# Patient Record
Sex: FEMALE | Race: WHITE | NOT HISPANIC OR LATINO | Employment: FULL TIME | URBAN - METROPOLITAN AREA
[De-identification: names, ages, dates, MRNs, and addresses within clinical notes are randomized per-mention and may not be internally consistent; named-entity substitution may affect disease eponyms.]

---

## 2017-01-12 ENCOUNTER — COMMUNICATION - HEALTHEAST (OUTPATIENT)
Dept: FAMILY MEDICINE | Facility: CLINIC | Age: 57
End: 2017-01-12

## 2017-05-04 ENCOUNTER — COMMUNICATION - HEALTHEAST (OUTPATIENT)
Dept: FAMILY MEDICINE | Facility: CLINIC | Age: 57
End: 2017-05-04

## 2017-05-04 DIAGNOSIS — F41.1 ANXIETY STATE: ICD-10-CM

## 2017-06-12 ENCOUNTER — HOSPITAL ENCOUNTER (OUTPATIENT)
Dept: MAMMOGRAPHY | Facility: HOSPITAL | Age: 57
Discharge: HOME | End: 2017-06-12
Attending: FAMILY MEDICINE

## 2017-06-12 DIAGNOSIS — Z12.31 VISIT FOR SCREENING MAMMOGRAM: ICD-10-CM

## 2017-07-16 ENCOUNTER — OFFICE VISIT (OUTPATIENT)
Dept: URGENT CARE | Facility: URGENT CARE | Age: 57
End: 2017-07-16
Payer: COMMERCIAL

## 2017-07-16 VITALS
OXYGEN SATURATION: 98 % | TEMPERATURE: 98 F | WEIGHT: 292.4 LBS | SYSTOLIC BLOOD PRESSURE: 138 MMHG | DIASTOLIC BLOOD PRESSURE: 88 MMHG | HEART RATE: 52 BPM

## 2017-07-16 DIAGNOSIS — I80.02 THROMBOPHLEBITIS OF SUPERFICIAL VEINS OF LEFT LOWER EXTREMITY: Primary | ICD-10-CM

## 2017-07-16 PROCEDURE — 99213 OFFICE O/P EST LOW 20 MIN: CPT | Performed by: PHYSICIAN ASSISTANT

## 2017-07-16 RX ORDER — DOXYCYCLINE 100 MG/1
100 CAPSULE ORAL 2 TIMES DAILY
Qty: 20 CAPSULE | Refills: 0 | Status: SHIPPED | OUTPATIENT
Start: 2017-07-16 | End: 2022-02-23

## 2017-07-16 RX ORDER — OXYBUTYNIN CHLORIDE 15 MG/1
15 TABLET, EXTENDED RELEASE ORAL DAILY
COMMUNITY
End: 2022-02-23

## 2017-07-16 ASSESSMENT — ENCOUNTER SYMPTOMS
SEIZURES: 0
HEARTBURN: 0
WHEEZING: 0
DIARRHEA: 0
WEAKNESS: 0
HALLUCINATIONS: 0
SPUTUM PRODUCTION: 0
TINGLING: 0
BLOOD IN STOOL: 0
ABDOMINAL PAIN: 0
SHORTNESS OF BREATH: 0
MYALGIAS: 0
PALPITATIONS: 0
EYE DISCHARGE: 0
SORE THROAT: 0
FEVER: 0
FOCAL WEAKNESS: 0
NAUSEA: 0
HEADACHES: 0
DYSURIA: 0
BLURRED VISION: 0
LOSS OF CONSCIOUSNESS: 0
WEIGHT LOSS: 0
FREQUENCY: 0
PHOTOPHOBIA: 0
DIZZINESS: 0
CONSTIPATION: 0
NEUROLOGICAL NEGATIVE: 1
SENSORY CHANGE: 0
BACK PAIN: 0
COUGH: 0
HEMOPTYSIS: 0
ORTHOPNEA: 0
NERVOUS/ANXIOUS: 0
VOMITING: 0
DOUBLE VISION: 0
INSOMNIA: 0
EYE PAIN: 0
NECK PAIN: 0
DEPRESSION: 0
EYE REDNESS: 0
DIAPHORESIS: 0

## 2017-07-16 ASSESSMENT — LIFESTYLE VARIABLES: SUBSTANCE_ABUSE: 0

## 2017-07-16 NOTE — PROGRESS NOTES
HPI    SUBJECTIVE:                                                    Riana Cui is a 56 year old female who presents to clinic today for concern regarding possible blood clot left lower leg. She has a long history of edema and wears Jobst stockings. She has noticed some redness associated with a pain on her left lower leg and is concerned about clot. She's never had DVTs in the past.        Problem list and histories reviewed & adjusted, as indicated.  Additional history: as documented    There is no problem list on file for this patient.    History reviewed. No pertinent surgical history.    Social History   Substance Use Topics     Smoking status: Current Every Day Smoker     Years: 1.00     Smokeless tobacco: Not on file     Alcohol use Not on file     History reviewed. No pertinent family history.      Current Outpatient Prescriptions   Medication Sig Dispense Refill     PAROXETINE HCL PO Take 40 mg by mouth daily       oxybutynin chloride 15 MG TB24 Take 15 mg by mouth daily       Allergies   Allergen Reactions     Ibuprofen Anaphylaxis     Shellfish-Derived Products Anaphylaxis     Labs reviewed in EPIC    Reviewed and updated as needed this visit by clinical staff  Tobacco  Allergies  Meds  Med Hx  Surg Hx  Fam Hx  Soc Hx      Reviewed and updated as needed this visit by Provider               Review of Systems   Constitutional: Negative for diaphoresis, fever, malaise/fatigue and weight loss.   HENT: Negative for congestion, ear discharge, ear pain, hearing loss, nosebleeds and sore throat.    Eyes: Negative for blurred vision, double vision, photophobia, pain, discharge and redness.   Respiratory: Negative for cough, hemoptysis, sputum production, shortness of breath and wheezing.    Cardiovascular: Negative for chest pain, palpitations, orthopnea and leg swelling.   Gastrointestinal: Negative for abdominal pain, blood in stool, constipation, diarrhea, heartburn, melena, nausea and vomiting.    Genitourinary: Negative.  Negative for dysuria, frequency and urgency.   Musculoskeletal: Positive for joint pain. Negative for back pain, myalgias and neck pain.   Skin: Negative for itching and rash.   Neurological: Negative.  Negative for dizziness, tingling, sensory change, focal weakness, seizures, loss of consciousness, weakness and headaches.   Endo/Heme/Allergies: Negative.    Psychiatric/Behavioral: Negative for depression, hallucinations, substance abuse and suicidal ideas. The patient is not nervous/anxious and does not have insomnia.          Physical Exam   Constitutional: She is oriented to person, place, and time and well-developed, well-nourished, and in no distress. No distress.   HENT:   Head: Normocephalic and atraumatic.   Right Ear: External ear normal.   Left Ear: External ear normal.   Nose: Nose normal.   Eyes: Conjunctivae and EOM are normal. Pupils are equal, round, and reactive to light. Right eye exhibits no discharge. Left eye exhibits no discharge. No scleral icterus.   Neck: Normal range of motion. Neck supple. No JVD present. No tracheal deviation present. No thyromegaly present.   Cardiovascular: Normal rate, regular rhythm, normal heart sounds and intact distal pulses.  Exam reveals no gallop and no friction rub.    No murmur heard.  Pulmonary/Chest: Effort normal and breath sounds normal. No stridor. No respiratory distress. She has no wheezes. She has no rales. She exhibits no tenderness.   Abdominal: Soft. Bowel sounds are normal. She exhibits no distension and no mass. There is no tenderness. There is no rebound and no guarding.   Musculoskeletal: Normal range of motion. She exhibits no tenderness.        Left lower leg: She exhibits edema.        Legs:  Redness is present along the superficial veins on the anterior aspect of the left lower leg. She has a long history of edema and wears Jobst stockings   Lymphadenopathy:     She has no cervical adenopathy.   Neurological: She  is alert and oriented to person, place, and time. She has normal reflexes. No cranial nerve deficit. She exhibits normal muscle tone. Gait normal.   Skin: Skin is warm and dry. No rash noted. She is not diaphoretic. No erythema. No pallor.   Psychiatric: Mood, memory, affect and judgment normal.       (I80.02) Thrombophlebitis of superficial veins of left lower extremity  (primary encounter diagnosis)  Comment:   Plan: doxycycline (VIBRAMYCIN) 100 MG capsule           I explained superficial thrombophlebitis to her and gave her a handout regarding this. She will use heat to the area continued to use her Jobst stockings and I would like her to use anti-inflammatories but she has an allergy to any improvement so we will avoid anti-inflammatories. I also explained that occasionally there is an infectious component to this and because we cannot use anti-inflammatories I have prescribed doxycycline.

## 2017-07-16 NOTE — PATIENT INSTRUCTIONS
Superficial Thrombophlebitis    The superficial veins are the veins near the surface of the skin. Superficial thrombophlebitis is a problem that occurs when one or more of these veins become inflamed (red, irritated, and swollen). This is most often due to a blood clot.  Causes  The problem may occur after injury to a vein. It may also occur after having an intravenous (IV) line placed. Other factors that can make the problem more likely include:    Varicose veins    Venous insufficiency    Bleeding disorders    Prolonged periods of rest and not moving around    IV drug abuse  Symptoms  Symptoms may appear in the affected area. They can include:    Pain    Tenderness    Redness    Warmth    Swelling    Hardening of the vein  In most cases, superficial thrombophlebitis resolves on its own with no problems. Treatment is focused on relieving symptoms.  Sometimes, there is a risk that the deep veins in the body may also be involved. This can lead to more serious problems. In such cases, further testing and treatments may be needed. Your healthcare provider can tell you more about this.  Home Care  To help relieve pain and swelling, you may be advised to:    Apply heat or cold to the affected area. Do this for up to 10 minutes as often as directed.    Heat: Use a warm compress, such as a heating pad.    Cold: Use a cold compress, such as a cold pack or bag of ice wrapped in a thin towel.    Take nonsteroidal anti-inflammatory drugs (NSAIDS), such as ibuprofen. In some cases, other pain medicines may be prescribed.    Keep the affected limb (arm or leg) raised above heart level as directed.    Wear elastic compression stockings or bandages as directed.    Avoid prolonged sitting or standing. Get up and walk often.  To help treat a blood clot, a blood thinner (anticoagulant) may be prescribed. If this is needed, be sure to take the medicine exactly as directed.  Follow-up care  Follow up with your healthcare provider as  advised. If imaging tests are done, they will be reviewed by a doctor. You ll be told the results and any new findings that may affect your treatment.  When to seek medical advice  Call your healthcare provider right away if any of these occur:    Fever of 100.4 F (38 C) or higher, or as directed by your provider    Increasing pain, swelling, or tenderness in the affected area    Spreading warmth or redness in the affected area  Call 911  Call 911 right away if any of these occur:    Trouble breathing    Chest pain or discomfort that worsens with deep breathing or coughing    Coughing (may cough up blood)    Fast or irregular heartbeat    Sweating    Anxiety    Lightheadedness, dizziness, or fainting    Extreme confusion    Extreme drowsiness or trouble waking up    New pain in the chest, arm, shoulder, neck, or upper back  Date Last Reviewed: 9/21/2015 2000-2017 [employer]. [address] [place] [address], [place] [address]. All rights reserved. This information is not intended as a substitute for professional medical care. Always follow your healthcare professional's instructions.

## 2017-07-16 NOTE — MR AVS SNAPSHOT
After Visit Summary   7/16/2017    Riana Cui    MRN: [id number]           Patient Information     Date Of Birth          1960        Visit Information        Provider Department      7/16/2017 4:05 PM Barrington Rodriguez PA-C [facility]        Care Instructions      Superficial Thrombophlebitis    The superficial veins are the veins near the surface of the skin. Superficial thrombophlebitis is a problem that occurs when one or more of these veins become inflamed (red, irritated, and swollen). This is most often due to a blood clot.  Causes  The problem may occur after injury to a vein. It may also occur after having an intravenous (IV) line placed. Other factors that can make the problem more likely include:    Varicose veins    Venous insufficiency    Bleeding disorders    Prolonged periods of rest and not moving around    IV drug abuse  Symptoms  Symptoms may appear in the affected area. They can include:    Pain    Tenderness    Redness    Warmth    Swelling    Hardening of the vein  In most cases, superficial thrombophlebitis resolves on its own with no problems. Treatment is focused on relieving symptoms.  Sometimes, there is a risk that the deep veins in the body may also be involved. This can lead to more serious problems. In such cases, further testing and treatments may be needed. Your healthcare provider can tell you more about this.  Home Care  To help relieve pain and swelling, you may be advised to:    Apply heat or cold to the affected area. Do this for up to 10 minutes as often as directed.    Heat: Use a warm compress, such as a heating pad.    Cold: Use a cold compress, such as a cold pack or bag of ice wrapped in a thin towel.    Take nonsteroidal anti-inflammatory drugs (NSAIDS), such as ibuprofen. In some cases, other pain medicines may be prescribed.    Keep the affected limb (arm or leg) raised above heart level as directed.    Wear  elastic compression stockings or bandages as directed.    Avoid prolonged sitting or standing. Get up and walk often.  To help treat a blood clot, a blood thinner (anticoagulant) may be prescribed. If this is needed, be sure to take the medicine exactly as directed.  Follow-up care  Follow up with your healthcare provider as advised. If imaging tests are done, they will be reviewed by a doctor. You ll be told the results and any new findings that may affect your treatment.  When to seek medical advice  Call your healthcare provider right away if any of these occur:    Fever of 100.4 F (38 C) or higher, or as directed by your provider    Increasing pain, swelling, or tenderness in the affected area    Spreading warmth or redness in the affected area  Call 911  Call 911 right away if any of these occur:    Trouble breathing    Chest pain or discomfort that worsens with deep breathing or coughing    Coughing (may cough up blood)    Fast or irregular heartbeat    Sweating    Anxiety    Lightheadedness, dizziness, or fainting    Extreme confusion    Extreme drowsiness or trouble waking up    New pain in the chest, arm, shoulder, neck, or upper back  Date Last Reviewed: 9/21/2015 2000-2017 [employer]. [address] [place] [address]. All rights reserved. This information is not intended as a substitute for professional medical care. Always follow your healthcare professional's instructions.                Follow-ups after your visit        Who to contact     If you have questions or need follow up information about today's clinic visit or your schedule please contact [facility] directly at [phone].  Normal or non-critical lab and imaging results will be communicated to you by MyChart, letter or phone within 4 business days after the clinic has received the results. If you do not hear from us within 7 days, please contact the clinic through MyChart or phone.  "If you have a critical or abnormal lab result, we will notify you by phone as soon as possible.  Submit refill requests through [employer] or call your pharmacy and they will forward the refill request to us. Please allow 3 business days for your refill to be completed.          Additional Information About Your Visit        [employer] Information     [employer] lets you send messages to your doctor, view your test results, renew your prescriptions, schedule appointments and more. To sign up, go to [email or web address] . Click on \"Log in\" on the left side of the screen, which will take you to the Welcome page. Then click on \"Sign up Now\" on the right side of the page.     You will be asked to enter the access code listed below, as well as some personal information. Please follow the directions to create your username and password.     Your access code is: [email or web address]  Expires: 10/14/2017  4:28 PM     Your access code will  in 90 days. If you need help or a new code, please call your [facility] clinic or [phone].        Care EveryWhere ID     This is your Care EveryWhere ID. This could be used by other organizations to access your [facility] medical records  [id number]        Your Vitals Were     Pulse Temperature Pulse Oximetry             52 98  F (36.7  C) (Tympanic) 98%          Blood Pressure from Last 3 Encounters:   17 138/88    Weight from Last 3 Encounters:   17 292 lb 6.4 oz (132.6 kg)              Today, you had the following     No orders found for display       Primary Care Provider    None Specified       No primary provider on file.        Equal Access to Services     [facility]: Hadii daron reyes [place], waaxda luqadaha, qaybta kaalmatammy vázquez . So [facility] [phone].    ATENCIÓN: Si habla español, tiene a javed disposición servicios gratuitos de asistencia lingüística. Llame al [phone].    We comply with applicable federal civil " rights laws and [place] laws. We do not discriminate on the basis of race, color, national origin, age, disability sex, sexual orientation or gender identity.            Thank you!     Thank you for choosing [facility]  for your care. Our goal is always to provide you with excellent care. Hearing back from our patients is one way we can continue to improve our services. Please take a few minutes to complete the written survey that you may receive in the mail after your visit with us. Thank you!             Your Updated Medication List - Protect others around you: Learn how to safely use, store and throw away your medicines at www.disposemymeds.org.          This list is accurate as of: 7/16/17  4:28 PM.  Always use your most recent med list.                   Brand Name Dispense Instructions for use Diagnosis    oxybutynin chloride 15 MG Tb24      Take 15 mg by mouth daily        PAROXETINE HCL PO      Take 40 mg by mouth daily

## 2017-07-20 ENCOUNTER — COMMUNICATION - HEALTHEAST (OUTPATIENT)
Dept: FAMILY MEDICINE | Facility: CLINIC | Age: 57
End: 2017-07-20

## 2017-07-20 ENCOUNTER — OFFICE VISIT - HEALTHEAST (OUTPATIENT)
Dept: FAMILY MEDICINE | Facility: CLINIC | Age: 57
End: 2017-07-20

## 2017-07-20 DIAGNOSIS — L03.90 CELLULITIS: ICD-10-CM

## 2017-07-20 DIAGNOSIS — I80.3 THROMBOPHLEBITIS LEG: ICD-10-CM

## 2017-07-25 ENCOUNTER — RECORDS - HEALTHEAST (OUTPATIENT)
Dept: VASCULAR ULTRASOUND | Facility: CLINIC | Age: 57
End: 2017-07-25

## 2017-07-25 ENCOUNTER — RECORDS - HEALTHEAST (OUTPATIENT)
Dept: ADMINISTRATIVE | Facility: OTHER | Age: 57
End: 2017-07-25

## 2017-07-25 ENCOUNTER — OFFICE VISIT - HEALTHEAST (OUTPATIENT)
Dept: VASCULAR SURGERY | Facility: CLINIC | Age: 57
End: 2017-07-25

## 2017-07-25 DIAGNOSIS — I83.893 SYMPTOMATIC VARICOSE VEINS OF BOTH LOWER EXTREMITIES: ICD-10-CM

## 2017-07-25 DIAGNOSIS — I87.2 VENOUS INSUFFICIENCY OF BOTH LOWER EXTREMITIES: ICD-10-CM

## 2017-07-25 DIAGNOSIS — I87.2 VENOUS INSUFFICIENCY (CHRONIC) (PERIPHERAL): ICD-10-CM

## 2017-07-25 DIAGNOSIS — I83.893 VARICOSE VEINS OF BILATERAL LOWER EXTREMITIES WITH OTHER COMPLICATIONS: ICD-10-CM

## 2017-09-24 ENCOUNTER — COMMUNICATION - HEALTHEAST (OUTPATIENT)
Dept: FAMILY MEDICINE | Facility: CLINIC | Age: 57
End: 2017-09-24

## 2017-09-24 DIAGNOSIS — K21.9 GERD (GASTROESOPHAGEAL REFLUX DISEASE): ICD-10-CM

## 2017-10-03 ENCOUNTER — COMMUNICATION - HEALTHEAST (OUTPATIENT)
Dept: FAMILY MEDICINE | Facility: CLINIC | Age: 57
End: 2017-10-03

## 2017-12-14 ENCOUNTER — COMMUNICATION - HEALTHEAST (OUTPATIENT)
Dept: FAMILY MEDICINE | Facility: CLINIC | Age: 57
End: 2017-12-14

## 2017-12-22 ENCOUNTER — OFFICE VISIT - HEALTHEAST (OUTPATIENT)
Dept: FAMILY MEDICINE | Facility: CLINIC | Age: 57
End: 2017-12-22

## 2017-12-22 DIAGNOSIS — N32.81 OVERACTIVE BLADDER: ICD-10-CM

## 2017-12-22 DIAGNOSIS — F41.1 ANXIETY STATE: ICD-10-CM

## 2017-12-22 DIAGNOSIS — Z12.11 SCREEN FOR COLON CANCER: ICD-10-CM

## 2017-12-22 DIAGNOSIS — Z98.84 BARIATRIC SURGERY STATUS: ICD-10-CM

## 2017-12-22 DIAGNOSIS — R05.9 COUGH: ICD-10-CM

## 2017-12-22 DIAGNOSIS — Z72.0 TOBACCO USE: ICD-10-CM

## 2017-12-22 DIAGNOSIS — Z00.00 ROUTINE GENERAL MEDICAL EXAMINATION AT A HEALTH CARE FACILITY: ICD-10-CM

## 2017-12-22 LAB
CHOLEST SERPL-MCNC: 212 MG/DL
FASTING STATUS PATIENT QL REPORTED: YES
HDLC SERPL-MCNC: 49 MG/DL
LDLC SERPL CALC-MCNC: 144 MG/DL
TRIGL SERPL-MCNC: 96 MG/DL

## 2017-12-22 ASSESSMENT — MIFFLIN-ST. JEOR: SCORE: 1863.42

## 2018-01-10 ENCOUNTER — COMMUNICATION - HEALTHEAST (OUTPATIENT)
Dept: FAMILY MEDICINE | Facility: CLINIC | Age: 58
End: 2018-01-10

## 2018-01-19 ENCOUNTER — COMMUNICATION - HEALTHEAST (OUTPATIENT)
Dept: FAMILY MEDICINE | Facility: CLINIC | Age: 58
End: 2018-01-19

## 2018-04-30 ENCOUNTER — RECORDS - HEALTHEAST (OUTPATIENT)
Dept: ADMINISTRATIVE | Facility: OTHER | Age: 58
End: 2018-04-30

## 2018-05-17 ENCOUNTER — COMMUNICATION - HEALTHEAST (OUTPATIENT)
Dept: FAMILY MEDICINE | Facility: CLINIC | Age: 58
End: 2018-05-17

## 2018-05-17 DIAGNOSIS — F41.1 ANXIETY STATE: ICD-10-CM

## 2018-08-16 ENCOUNTER — COMMUNICATION - HEALTHEAST (OUTPATIENT)
Dept: FAMILY MEDICINE | Facility: CLINIC | Age: 58
End: 2018-08-16

## 2019-07-15 ENCOUNTER — COMMUNICATION - HEALTHEAST (OUTPATIENT)
Dept: FAMILY MEDICINE | Facility: CLINIC | Age: 59
End: 2019-07-15

## 2019-07-15 DIAGNOSIS — F41.1 ANXIETY STATE: ICD-10-CM

## 2019-08-05 ENCOUNTER — OFFICE VISIT - HEALTHEAST (OUTPATIENT)
Dept: FAMILY MEDICINE | Facility: CLINIC | Age: 59
End: 2019-08-05

## 2019-08-05 DIAGNOSIS — Z98.84 BARIATRIC SURGERY STATUS: ICD-10-CM

## 2019-08-05 DIAGNOSIS — N32.81 OVERACTIVE BLADDER: ICD-10-CM

## 2019-08-05 DIAGNOSIS — Z00.00 ROUTINE GENERAL MEDICAL EXAMINATION AT A HEALTH CARE FACILITY: ICD-10-CM

## 2019-08-05 DIAGNOSIS — E66.01 MORBID OBESITY (H): ICD-10-CM

## 2019-08-05 DIAGNOSIS — Z72.0 TOBACCO USE: ICD-10-CM

## 2019-08-05 DIAGNOSIS — Z12.11 SCREEN FOR COLON CANCER: ICD-10-CM

## 2019-08-05 DIAGNOSIS — E03.8 OTHER SPECIFIED HYPOTHYROIDISM: ICD-10-CM

## 2019-08-05 DIAGNOSIS — Z12.31 VISIT FOR SCREENING MAMMOGRAM: ICD-10-CM

## 2019-08-05 DIAGNOSIS — F41.1 ANXIETY STATE: ICD-10-CM

## 2019-08-05 LAB
ALBUMIN SERPL-MCNC: 3.4 G/DL (ref 3.5–5)
ALP SERPL-CCNC: 85 U/L (ref 45–120)
ALT SERPL W P-5'-P-CCNC: 10 U/L (ref 0–45)
ANION GAP SERPL CALCULATED.3IONS-SCNC: 9 MMOL/L (ref 5–18)
AST SERPL W P-5'-P-CCNC: 15 U/L (ref 0–40)
BILIRUB SERPL-MCNC: 0.3 MG/DL (ref 0–1)
BUN SERPL-MCNC: 10 MG/DL (ref 8–22)
CALCIUM SERPL-MCNC: 9 MG/DL (ref 8.5–10.5)
CHLORIDE BLD-SCNC: 107 MMOL/L (ref 98–107)
CHOLEST SERPL-MCNC: 180 MG/DL
CO2 SERPL-SCNC: 24 MMOL/L (ref 22–31)
CREAT SERPL-MCNC: 0.74 MG/DL (ref 0.6–1.1)
ERYTHROCYTE [DISTWIDTH] IN BLOOD BY AUTOMATED COUNT: 12.5 % (ref 11–14.5)
FASTING STATUS PATIENT QL REPORTED: YES
FERRITIN SERPL-MCNC: 12 NG/ML (ref 10–130)
GFR SERPL CREATININE-BSD FRML MDRD: >60 ML/MIN/1.73M2
GLUCOSE BLD-MCNC: 88 MG/DL (ref 70–125)
HBA1C MFR BLD: 5.6 % (ref 3.5–6)
HCT VFR BLD AUTO: 41.1 % (ref 35–47)
HDLC SERPL-MCNC: 48 MG/DL
HGB BLD-MCNC: 13.8 G/DL (ref 12–16)
LDLC SERPL CALC-MCNC: 116 MG/DL
MCH RBC QN AUTO: 29.1 PG (ref 27–34)
MCHC RBC AUTO-ENTMCNC: 33.5 G/DL (ref 32–36)
MCV RBC AUTO: 87 FL (ref 80–100)
PLATELET # BLD AUTO: 190 THOU/UL (ref 140–440)
PMV BLD AUTO: 8.8 FL (ref 7–10)
POTASSIUM BLD-SCNC: 4.2 MMOL/L (ref 3.5–5)
PROT SERPL-MCNC: 6.2 G/DL (ref 6–8)
RBC # BLD AUTO: 4.75 MILL/UL (ref 3.8–5.4)
SODIUM SERPL-SCNC: 140 MMOL/L (ref 136–145)
TRIGL SERPL-MCNC: 80 MG/DL
TSH SERPL DL<=0.005 MIU/L-ACNC: 1.44 UIU/ML (ref 0.3–5)
VIT B12 SERPL-MCNC: 222 PG/ML (ref 213–816)
WBC: 5.2 THOU/UL (ref 4–11)

## 2019-08-05 ASSESSMENT — MIFFLIN-ST. JEOR: SCORE: 1799.92

## 2019-08-06 LAB — 25(OH)D3 SERPL-MCNC: 18.7 NG/ML (ref 30–80)

## 2019-08-07 ENCOUNTER — COMMUNICATION - HEALTHEAST (OUTPATIENT)
Dept: FAMILY MEDICINE | Facility: CLINIC | Age: 59
End: 2019-08-07

## 2019-08-07 LAB — ZINC SERPL-MCNC: 57.2 UG/DL (ref 60–120)

## 2019-08-08 LAB
ANNOTATION COMMENT IMP: ABNORMAL
VIT A SERPL-MCNC: 0.25 MG/L (ref 0.3–1.2)
VITAMIN A (RETINYL PALMITATE): <0.02 MG/L (ref 0–0.1)

## 2019-09-09 ENCOUNTER — COMMUNICATION - HEALTHEAST (OUTPATIENT)
Dept: FAMILY MEDICINE | Facility: CLINIC | Age: 59
End: 2019-09-09

## 2019-09-24 ENCOUNTER — AMBULATORY - HEALTHEAST (OUTPATIENT)
Dept: NURSING | Facility: CLINIC | Age: 59
End: 2019-09-24

## 2019-10-09 ENCOUNTER — HOSPITAL ENCOUNTER (OUTPATIENT)
Dept: MAMMOGRAPHY | Facility: CLINIC | Age: 59
Discharge: HOME | End: 2019-10-09

## 2019-10-09 DIAGNOSIS — Z12.31 VISIT FOR SCREENING MAMMOGRAM: ICD-10-CM

## 2020-01-24 ENCOUNTER — COMMUNICATION - HEALTHEAST (OUTPATIENT)
Dept: FAMILY MEDICINE | Facility: CLINIC | Age: 60
End: 2020-01-24

## 2020-07-08 ENCOUNTER — COMMUNICATION - HEALTHEAST (OUTPATIENT)
Dept: FAMILY MEDICINE | Facility: CLINIC | Age: 60
End: 2020-07-08

## 2020-07-13 ENCOUNTER — OFFICE VISIT - HEALTHEAST (OUTPATIENT)
Dept: FAMILY MEDICINE | Facility: CLINIC | Age: 60
End: 2020-07-13

## 2020-07-13 DIAGNOSIS — R60.0 PEDAL EDEMA: ICD-10-CM

## 2020-07-13 ASSESSMENT — PATIENT HEALTH QUESTIONNAIRE - PHQ9: SUM OF ALL RESPONSES TO PHQ QUESTIONS 1-9: 1

## 2020-07-21 ENCOUNTER — COMMUNICATION - HEALTHEAST (OUTPATIENT)
Dept: FAMILY MEDICINE | Facility: CLINIC | Age: 60
End: 2020-07-21

## 2020-07-21 DIAGNOSIS — F41.1 ANXIETY STATE: ICD-10-CM

## 2020-08-20 ENCOUNTER — OFFICE VISIT - HEALTHEAST (OUTPATIENT)
Dept: FAMILY MEDICINE | Facility: CLINIC | Age: 60
End: 2020-08-20

## 2020-08-20 DIAGNOSIS — E03.8 OTHER SPECIFIED HYPOTHYROIDISM: ICD-10-CM

## 2020-08-20 DIAGNOSIS — Z00.00 ROUTINE GENERAL MEDICAL EXAMINATION AT A HEALTH CARE FACILITY: ICD-10-CM

## 2020-08-20 DIAGNOSIS — F41.1 ANXIETY STATE: ICD-10-CM

## 2020-08-20 DIAGNOSIS — Z12.11 SCREEN FOR COLON CANCER: ICD-10-CM

## 2020-08-20 DIAGNOSIS — E66.01 MORBID OBESITY (H): ICD-10-CM

## 2020-08-20 DIAGNOSIS — Z98.84 BARIATRIC SURGERY STATUS: ICD-10-CM

## 2020-08-20 DIAGNOSIS — Z72.0 TOBACCO USE: ICD-10-CM

## 2020-08-20 LAB
ALBUMIN SERPL-MCNC: 3.4 G/DL (ref 3.5–5)
ALP SERPL-CCNC: 89 U/L (ref 45–120)
ALT SERPL W P-5'-P-CCNC: <9 U/L (ref 0–45)
ANION GAP SERPL CALCULATED.3IONS-SCNC: 11 MMOL/L (ref 5–18)
AST SERPL W P-5'-P-CCNC: 14 U/L (ref 0–40)
BILIRUB SERPL-MCNC: 0.4 MG/DL (ref 0–1)
BUN SERPL-MCNC: 13 MG/DL (ref 8–22)
CALCIUM SERPL-MCNC: 8.6 MG/DL (ref 8.5–10.5)
CHLORIDE BLD-SCNC: 103 MMOL/L (ref 98–107)
CHOLEST SERPL-MCNC: 205 MG/DL
CO2 SERPL-SCNC: 25 MMOL/L (ref 22–31)
CREAT SERPL-MCNC: 0.74 MG/DL (ref 0.6–1.1)
ERYTHROCYTE [DISTWIDTH] IN BLOOD BY AUTOMATED COUNT: 13 % (ref 11–14.5)
FASTING STATUS PATIENT QL REPORTED: YES
FERRITIN SERPL-MCNC: 13 NG/ML (ref 10–130)
GFR SERPL CREATININE-BSD FRML MDRD: >60 ML/MIN/1.73M2
GLUCOSE BLD-MCNC: 88 MG/DL (ref 70–125)
HBA1C MFR BLD: 5.3 %
HCT VFR BLD AUTO: 40.9 % (ref 35–47)
HDLC SERPL-MCNC: 47 MG/DL
HGB BLD-MCNC: 13.6 G/DL (ref 12–16)
LDLC SERPL CALC-MCNC: 139 MG/DL
MCH RBC QN AUTO: 29 PG (ref 27–34)
MCHC RBC AUTO-ENTMCNC: 33.1 G/DL (ref 32–36)
MCV RBC AUTO: 88 FL (ref 80–100)
PLATELET # BLD AUTO: 185 THOU/UL (ref 140–440)
PMV BLD AUTO: 9 FL (ref 7–10)
POTASSIUM BLD-SCNC: 4.1 MMOL/L (ref 3.5–5)
PROT SERPL-MCNC: 6.5 G/DL (ref 6–8)
RBC # BLD AUTO: 4.67 MILL/UL (ref 3.8–5.4)
SODIUM SERPL-SCNC: 139 MMOL/L (ref 136–145)
TRIGL SERPL-MCNC: 96 MG/DL
TSH SERPL DL<=0.005 MIU/L-ACNC: 2.14 UIU/ML (ref 0.3–5)
VIT B12 SERPL-MCNC: 246 PG/ML (ref 213–816)
WBC: 5.2 THOU/UL (ref 4–11)

## 2020-08-20 ASSESSMENT — MIFFLIN-ST. JEOR: SCORE: 1826.67

## 2020-08-21 LAB
25(OH)D3 SERPL-MCNC: 18.5 NG/ML (ref 30–80)
25(OH)D3 SERPL-MCNC: 18.5 NG/ML (ref 30–80)
HPV SOURCE: NORMAL
HUMAN PAPILLOMA VIRUS 16 DNA: NEGATIVE
HUMAN PAPILLOMA VIRUS 18 DNA: NEGATIVE
HUMAN PAPILLOMA VIRUS FINAL DIAGNOSIS: NORMAL
HUMAN PAPILLOMA VIRUS OTHER HR: NEGATIVE
SPECIMEN DESCRIPTION: NORMAL

## 2020-08-22 LAB
ANNOTATION COMMENT IMP: ABNORMAL
VIT A SERPL-MCNC: 0.26 MG/L (ref 0.3–1.2)
VITAMIN A (RETINYL PALMITATE): <0.02 MG/L (ref 0–0.1)

## 2020-08-23 LAB — VIT B1 PYROPHOSHATE BLD-SCNC: 93 NMOL/L (ref 70–180)

## 2020-09-02 LAB
BKR LAB AP ABNORMAL BLEEDING: NO
BKR LAB AP BIRTH CONTROL/HORMONES: NORMAL
BKR LAB AP CERVICAL APPEARANCE: NORMAL
BKR LAB AP GYN ADEQUACY: NORMAL
BKR LAB AP GYN INTERPRETATION: NORMAL
BKR LAB AP HPV REFLEX: NORMAL
BKR LAB AP LMP: NORMAL
BKR LAB AP PATIENT STATUS: NO
BKR LAB AP PREVIOUS ABNORMAL: NO
BKR LAB AP PREVIOUS NORMAL: NORMAL
HIGH RISK?: NO
PATH REPORT.COMMENTS IMP SPEC: NORMAL
RESULT FLAG (HE HISTORICAL CONVERSION): NORMAL

## 2020-09-03 ENCOUNTER — RECORDS - HEALTHEAST (OUTPATIENT)
Dept: ADMINISTRATIVE | Facility: OTHER | Age: 60
End: 2020-09-03

## 2020-09-03 ENCOUNTER — AMBULATORY - HEALTHEAST (OUTPATIENT)
Dept: SURGERY | Facility: HOSPITAL | Age: 60
End: 2020-09-03

## 2020-09-03 DIAGNOSIS — Z11.59 ENCOUNTER FOR SCREENING FOR OTHER VIRAL DISEASES: ICD-10-CM

## 2020-12-18 ENCOUNTER — VIRTUAL VISIT (OUTPATIENT)
Dept: FAMILY MEDICINE | Facility: OTHER | Age: 60
End: 2020-12-18

## 2020-12-18 ENCOUNTER — SURGERY - HEALTHEAST (OUTPATIENT)
Dept: SURGERY | Facility: HOSPITAL | Age: 60
End: 2020-12-18
Payer: COMMERCIAL

## 2020-12-19 DIAGNOSIS — Z20.822 ENCOUNTER FOR LABORATORY TESTING FOR COVID-19 VIRUS: Primary | ICD-10-CM

## 2020-12-19 PROCEDURE — U0003 INFECTIOUS AGENT DETECTION BY NUCLEIC ACID (DNA OR RNA); SEVERE ACUTE RESPIRATORY SYNDROME CORONAVIRUS 2 (SARS-COV-2) (CORONAVIRUS DISEASE [COVID-19]), AMPLIFIED PROBE TECHNIQUE, MAKING USE OF HIGH THROUGHPUT TECHNOLOGIES AS DESCRIBED BY CMS-2020-01-R: HCPCS | Performed by: FAMILY MEDICINE

## 2020-12-19 NOTE — PROGRESS NOTES
"Date: 2020 21:38:45  Clinician: Melina Villanueva  Clinician NPI: [id number]  Patient: Riana Cui  Patient : 1960  Patient Address: [address] [place], [place] [address]  Patient Phone: [phone]  Visit Protocol: URI  Patient Summary:  Riana is a 60 year old ( : 1960 ) female who initiated a [facility] Visit for COVID-19 (Coronavirus) evaluation and screening. When asked the question \"Please sign me up to receive news, health information and promotions from [facility].\", Riana responded \"No\".    Riana states her symptoms started 1-2 days ago.   Her symptoms consist of facial pain or pressure, a headache, a cough, malaise, tooth pain, and rhinitis.   Symptom details     Nasal secretions: The color of her mucus is clear.    Cough: Riana coughs a few times an hour and her cough is not more bothersome at night. Phlegm does not come into her throat when she coughs. She does not believe her cough is caused by post-nasal drip.     Facial pain or pressure: The facial pain or pressure does not feel worse when bending or leaning forward.     Headache: She states the headache is mild (1-3 on a 10 point pain scale).     Tooth pain: The tooth pain is not caused by a cavity, recent dental work, or other mouth problems.      Riana denies having diarrhea, myalgias, anosmia, ear pain, wheezing, fever, nasal congestion, nausea, chills, sore throat, ageusia, and vomiting. She also denies having recent facial or sinus surgery in the past 60 days and taking antibiotic medication in the past month. She is not experiencing dyspnea.   Precipitating events  She has not recently been exposed to someone with influenza. Riana has been in close contact with the following high risk individuals: adults 65 or older and children under the age of 5.   Pertinent COVID-19 (Coronavirus) information  Riana does not work or volunteer as healthcare worker or a . In the past 14 days, Riana has not " worked or volunteered at a healthcare facility or group living setting.   In the past 14 days, she also has not lived in a congregate living setting.   Riana has not had a close contact with a laboratory-confirmed COVID-19 patient within 14 days of symptom onset.    Riana has not been tested for COVID-19.   Pertinent medical history  Riana typically gets a yeast infection when she takes antibiotics. She is not sure if she has used fluconazole (Diflucan) to treat previous yeast infections.   She has been told by her provider to avoid NSAIDs.   Riana does not have diabetes. She denies having immunosuppressive conditions (e.g., chemotherapy, HIV, organ transplant, long-term use of steroids or other immunosuppressive medications, splenectomy). She denies having congestive heart failure and severe COPD. She does not have asthma.   Riana needs a return to work/school note.   Riana smokes or uses smokeless tobacco.   Weight: 280 lbs    MEDICATIONS: Paxil oral, ALLERGIES: ibuprofen  Clinician Response:  Dear Riana,   Your symptoms show that you may have coronavirus (COVID-19). This illness can cause fever, cough and trouble breathing. Many people get a mild case and get better on their own. Some people can get very sick.  What should I do?  We would like to test you for this virus.   1. Please call [phone] to schedule your visit. Explain that you were referred by [facility] to have a COVID-19 test. Be ready to share your [facility] visit ID number.  * If you need to schedule in [facility] please call [phone] or for [employer] employees please call [phone].  * If you need to schedule in the [place] area please call [phone]. [employer] employees call [phone].  The following will serve as your written order for this COVID Test, ordered by me, for the indication of suspected COVID [Z20.828]: The test will be ordered in [employer], our electronic health record, after you are scheduled. It will show  "as ordered and authorized by Kendell Hall MD.  Order: COVID-19 (Coronavirus) PCR for SYMPTOMATIC testing from [facility].   2. When it's time for your COVID test:  Stay at least 6 feet away from others. (If someone will drive you to your test, stay in the backseat, as far away from the  as you can.)   Cover your mouth and nose with a mask, tissue or washcloth.  Go straight to the testing site. Don't make any stops on the way there or back.      3.Starting now: Stay home and away from others (self-isolate) until:   You've had no fever---and no medicine that reduces fever---for one full day (24 hours). And...   Your other symptoms have gotten better. For example, your cough or breathing has improved. And...   At least 10 days have passed since your symptoms started.       During this time, don't leave the house except for testing or medical care.   Stay in your own room, even for meals. Use your own bathroom if you can.   Stay away from others in your home. No hugging, kissing or shaking hands. No visitors.  Don't go to work, school or anywhere else.    Clean \"high touch\" surfaces often (doorknobs, counters, handles, etc.). Use a household cleaning spray or wipes. You'll find a full list of  on the EPA website: www.epa.gov/pesticide-registration/list-n-disinfectants-use-against-sars-cov-2.   Cover your mouth and nose with a mask, tissue or washcloth to avoid spreading germs.  Wash your hands and face often. Use soap and water.  Caregivers in these groups are at risk for severe illness due to COVID-19:  o People 65 years and older  o People who live in a nursing home or long-term care facility  o People with chronic disease (lung, heart, cancer, diabetes, kidney, liver, immunologic)  o People who have a weakened immune system, including those who:   Are in cancer treatment  Take medicine that weakens the immune system, such as corticosteroids  Had a bone marrow or organ transplant  Have an immune deficiency  " Have poorly controlled HIV or AIDS  Are obese (body mass index of 40 or higher)  Smoke regularly   o Caregivers should wear gloves while washing dishes, handling laundry and cleaning bedrooms and bathrooms.  o Use caution when washing and drying laundry: Don't shake dirty laundry, and use the warmest water setting that you can.  o For more tips, go to www.cdc.gov/coronavirus/2019-ncov/downloads/10Things.pdf.    4.Sign up for [employer]. We know it's scary to hear that you might have COVID-19. We want to track your symptoms to make sure you're okay over the next 2 weeks. Please look for an email from [employer]---this is a free, online program that we'll use to keep in touch. To sign up, follow the link in the email. Learn more at [email or web address]  How can I take care of myself?   Get lots of rest. Drink extra fluids (unless a doctor has told you not to).   Take Tylenol (acetaminophen) for fever or pain. If you have liver or kidney problems, ask your family doctor if it's okay to take Tylenol.   Adults can take either:    650 mg (two 325 mg pills) every 4 to 6 hours, or...   1,000 mg (two 500 mg pills) every 8 hours as needed.    Note: Don't take more than 3,000 mg in one day. Acetaminophen is found in many medicines (both prescribed and over-the-counter medicines). Read all labels to be sure you don't take too much.   For children, check the Tylenol bottle for the right dose. The dose is based on the child's age or weight.    If you have other health problems (like cancer, heart failure, an organ transplant or severe kidney disease): Call your specialty clinic if you don't feel better in the next 2 days.       Know when to call 911. Emergency warning signs include:    Trouble breathing or shortness of breath Pain or pressure in the chest that doesn't go away Feeling confused like you haven't felt before, or not being able to wake up Bluish-colored lips or face.  Where can I get more information?    [facility] -- About COVID-19: [email or web address]   CDC -- What to Do If You're Sick: www.cdc.gov/coronavirus/2019-ncov/about/steps-when-sick.html   CDC -- Ending Home Isolation: www.cdc.gov/coronavirus/2019-ncov/hcp/disposition-in-home-patients.html   [facility] -- Caring for Someone: www.cdc.gov/coronavirus/2019-ncov/if-you-are-sick/care-for-someone.html   [facility] -- Interim Guidance for Hospital Discharge to Home: www.[facility].mn./diseases/coronavirus/hcp/hospdischarge.pdf   [facility] clinical trials (COVID-19 research studies): clinicalaffairs.[facility]-clinical-trials    Below are the COVID-19 hotlines at the [place] Department of Health ([facility]). Interpreters are available.    For health questions: Call 961-610-8301 or 1-523.215.2125 (7 a.m. to 7 p.m.) For questions about schools and childcare: Call 928-373-1067 or 1-479.988.7217 (7 a.m. to 7 p.m.)    Diagnosis: Other malaise  Diagnosis ICD: R53.81

## 2020-12-20 LAB
SARS-COV-2 RNA SPEC QL NAA+PROBE: NOT DETECTED
SPECIMEN SOURCE: NORMAL

## 2021-01-14 ENCOUNTER — HEALTH MAINTENANCE LETTER (OUTPATIENT)
Age: 61
End: 2021-01-14

## 2021-03-24 ENCOUNTER — COMMUNICATION - HEALTHEAST (OUTPATIENT)
Dept: FAMILY MEDICINE | Facility: CLINIC | Age: 61
End: 2021-03-24

## 2021-03-24 DIAGNOSIS — Z12.11 SPECIAL SCREENING FOR MALIGNANT NEOPLASMS, COLON: ICD-10-CM

## 2021-03-26 ENCOUNTER — RECORDS - HEALTHEAST (OUTPATIENT)
Dept: ADMINISTRATIVE | Facility: OTHER | Age: 61
End: 2021-03-26

## 2021-03-27 ENCOUNTER — AMBULATORY - HEALTHEAST (OUTPATIENT)
Dept: SURGERY | Facility: CLINIC | Age: 61
End: 2021-03-27

## 2021-03-27 DIAGNOSIS — Z11.59 ENCOUNTER FOR SCREENING FOR OTHER VIRAL DISEASES: ICD-10-CM

## 2021-04-08 ENCOUNTER — IMMUNIZATION (OUTPATIENT)
Dept: FAMILY MEDICINE | Facility: CLINIC | Age: 61
End: 2021-04-08
Payer: COMMERCIAL

## 2021-04-08 PROCEDURE — 91301 PR COVID VAC MODERNA 100 MCG/0.5 ML IM: CPT

## 2021-04-08 PROCEDURE — 0011A PR COVID VAC MODERNA 100 MCG/0.5 ML IM: CPT

## 2021-05-06 ENCOUNTER — IMMUNIZATION (OUTPATIENT)
Dept: FAMILY MEDICINE | Facility: CLINIC | Age: 61
End: 2021-05-06
Attending: FAMILY MEDICINE
Payer: COMMERCIAL

## 2021-05-06 PROCEDURE — 91301 PR COVID VAC MODERNA 100 MCG/0.5 ML IM: CPT

## 2021-05-06 PROCEDURE — 0012A PR COVID VAC MODERNA 100 MCG/0.5 ML IM: CPT

## 2021-05-10 ENCOUNTER — OFFICE VISIT - HEALTHEAST (OUTPATIENT)
Dept: FAMILY MEDICINE | Facility: CLINIC | Age: 61
End: 2021-05-10

## 2021-05-10 DIAGNOSIS — E03.8 OTHER SPECIFIED HYPOTHYROIDISM: ICD-10-CM

## 2021-05-10 DIAGNOSIS — Z98.84 BARIATRIC SURGERY STATUS: ICD-10-CM

## 2021-05-10 DIAGNOSIS — Z12.11 SPECIAL SCREENING FOR MALIGNANT NEOPLASMS, COLON: ICD-10-CM

## 2021-05-10 DIAGNOSIS — E66.01 MORBID OBESITY (H): ICD-10-CM

## 2021-05-10 DIAGNOSIS — Z01.818 PREOP GENERAL PHYSICAL EXAM: ICD-10-CM

## 2021-05-10 DIAGNOSIS — F41.1 ANXIETY STATE: ICD-10-CM

## 2021-05-10 DIAGNOSIS — Z72.0 TOBACCO USE: ICD-10-CM

## 2021-05-10 ASSESSMENT — MIFFLIN-ST. JEOR: SCORE: 1796.73

## 2021-05-20 ENCOUNTER — RECORDS - HEALTHEAST (OUTPATIENT)
Dept: ADMINISTRATIVE | Facility: OTHER | Age: 61
End: 2021-05-20

## 2021-05-28 ENCOUNTER — AMBULATORY - HEALTHEAST (OUTPATIENT)
Dept: LAB | Facility: CLINIC | Age: 61
End: 2021-05-28

## 2021-05-28 DIAGNOSIS — Z11.59 ENCOUNTER FOR SCREENING FOR OTHER VIRAL DISEASES: ICD-10-CM

## 2021-05-29 ENCOUNTER — RECORDS - HEALTHEAST (OUTPATIENT)
Dept: ADMINISTRATIVE | Facility: CLINIC | Age: 61
End: 2021-05-29

## 2021-05-29 ENCOUNTER — COMMUNICATION - HEALTHEAST (OUTPATIENT)
Dept: SCHEDULING | Facility: CLINIC | Age: 61
End: 2021-05-29

## 2021-05-29 LAB
SARS-COV-2 PCR COMMENT: NORMAL
SARS-COV-2 RNA SPEC QL NAA+PROBE: NEGATIVE
SARS-COV-2 VIRUS SPECIMEN SOURCE: NORMAL

## 2021-05-30 ENCOUNTER — RECORDS - HEALTHEAST (OUTPATIENT)
Dept: ADMINISTRATIVE | Facility: CLINIC | Age: 61
End: 2021-05-30

## 2021-05-30 NOTE — TELEPHONE ENCOUNTER
Ok to wait-  Pt is scheduled on 8/5/19 for a physical.  Has enough to get through until Dr. Hagen returns on Monday.  Rx queued for MD approval.

## 2021-05-30 NOTE — TELEPHONE ENCOUNTER
Left message for pt to call back to schedule an appt.  Once appt has been scheduled, refill can be sent to get her through.

## 2021-05-30 NOTE — TELEPHONE ENCOUNTER
RN cannot approve Refill Request    RN can NOT refill this medication PCP messaged that patient is overdue for Office Visit. Last office visit: 10/27/2015 Roxy Hagen MD Last Physical: 12/22/2017 Last MTM visit: Visit date not found Last visit same specialty: 10/27/2015 Roxy Hagen MD.  Next visit within 3 mo: Visit date not found  Next physical within 3 mo: Visit date not found      Nadine Verdin, [facility] Triage/Med Refill 7/15/2019    Requested Prescriptions   Pending Prescriptions Disp Refills     PARoxetine (PAXIL) 40 MG tablet [Pharmacy Med Name: PAROXETINE 40MG TABLETS] 90 tablet 0     Sig: TAKE 1 TABLET(40 MG) BY MOUTH EVERY MORNING       [facility] Refill Protocol  Failed - 7/15/2019  4:09 AM        Failed - PCP or prescribing provider visit in last year     Last office visit with prescriber/PCP: 10/27/2015 Roxy Hagen MD OR same dept: Visit date not found OR same specialty: 10/27/2015 Roxy Hagen MD  Last physical: 12/22/2017 Last MTM visit: Visit date not found   Next visit within 3 mo: Visit date not found  Next physical within 3 mo: Visit date not found  Prescriber OR PCP: Roxy Hagen MD  Last diagnosis associated with med order: 1. Anxiety state  - PARoxetine (PAXIL) 40 MG tablet [Pharmacy Med Name: PAROXETINE 40MG TABLETS]; TAKE 1 TABLET(40 MG) BY MOUTH EVERY MORNING  Dispense: 90 tablet; Refill: 0    If protocol passes may refill for 12 months if within 3 months of last provider visit (or a total of 15 months).

## 2021-05-31 ENCOUNTER — RECORDS - HEALTHEAST (OUTPATIENT)
Dept: ADMINISTRATIVE | Facility: CLINIC | Age: 61
End: 2021-05-31

## 2021-05-31 VITALS — BODY MASS INDEX: 49.17 KG/M2 | HEIGHT: 64 IN | WEIGHT: 288 LBS

## 2021-05-31 VITALS — BODY MASS INDEX: 52.35 KG/M2 | WEIGHT: 293 LBS

## 2021-05-31 NOTE — PROGRESS NOTES
Assessment/ Plan     1. Routine general medical examination at a health care facility  As far as healthcare maintenance, she will be due for Pap next year in 2020.  She is due for mammogram and a colonoscopy.  Orders were placed today.  She is due for her fasting blood work.  - Comprehensive Metabolic Panel  - HM2(CBC w/o Differential)  - Lipid Cascade    2. Morbid obesity (H)  Patient is status post gastric bypass and is regained a lot of her weight.  She did just lose 14 pounds by cutting out soda.  I congratulated her on her efforts and encouraged her to keep going.  - Glycosylated Hemoglobin A1c  - Lipid Cascade    3. Anxiety  Under good control with Paxil.    4. Other specified hypothyroidism  Patient has a past history of hypothyroidism.  She is currently not on medication.  Check this today.  - Thyroid Stimulating Hormone (TSH)    5. Tobacco use  Patient still smoking.  She is not ready to quit at this point as she just gave up soda.    6. Overactive bladder  Patient is using as needed Detrol.    7. Post-gastric Bypass For Obesity  We will check some blood work today.  - Vitamin D, Total (25-Hydroxy)  - Zinc, Serum  - Vitamin B12  - Vitamin A (Retinol)  - Ferritin    8. Visit for screening mammogram  Orders placed.  - Mammo Screening Bilateral; Future    9. Screen for colon cancer  - Ambulatory referral for Colonoscopy      Subjective:     Riana Cui is a 58 y.o. female who presents for an annual exam.  Overall, patient states she is doing fairly well.  She has lost 14 pounds in the last 2 weeks by stopping soda.  She admits that she was drinking somewhere between 6 and 12 sodas per day, Mountain Dew.  She has now switched over to water and has lost some weight.  She has a history of gastric bypass about 10 years ago.  She did really well initially, but has slowly put the weight back on.  She used to have sleep apnea and then after she lost the weight, it resolved.  She feels like maybe it has returned.   We discussed options today about doing a sleep study or having her continue to work on weight loss.  We both agreed that she is can keep working on weight loss for the next 6 months and we will go from there.  Her anxiety is under good control with the Paxil.  She is distant history of some hypothyroidism so we are checking yearly.  She is unfortunately, still smoking.  She is occasionally using Detrol for overactive bladder.  We discussed healthcare maintenance and she is due for colonoscopy and mammogram.  She will be due for her Pap next year.    Healthy Habits:   Regular Exercise: No  Sunscreen Use: Yes  Healthy Diet: No  Dental Visits Regularly: Yes  Seat Belt: Yes  Sexually active: Yes  Self Breast Exam Monthly:No  Colonoscopy: No  Lipid Profile: Yes  Glucose Screen: Yes  Prevention of Osteoporosis: Yes  Last Dexa: No        Immunization History   Administered Date(s) Administered     Influenza, inj, historic,unspecified 2015, 10/05/2017     Influenza, seasonal,quad inj 6-35 mos 10/11/2012     Td, Adult, Absorbed 2006     Tdap 2006, 2012     Immunization status: up to date and documented.     Gynecologic History  No LMP recorded. Patient is postmenopausal.  Contraception: none  Last Pap: . Results were: normal  Last mammogram: . Results were: normal      OB History    Para Term  AB Living   2 2 2         SAB TAB Ectopic Multiple Live Births                  # Outcome Date GA Lbr Jon/2nd Weight Sex Delivery Anes PTL Lv   2 Term            1 Term                Current Outpatient Medications   Medication Sig Dispense Refill     ondansetron (ZOFRAN ODT) 4 MG disintegrating tablet Take 1 tablet (4 mg total) by mouth every 8 (eight) hours as needed for nausea. 12 tablet 0     PARoxetine (PAXIL) 40 MG tablet Take 1 tablet (40 mg total) by mouth every morning. 90 tablet 0     tolterodine (DETROL LA) 4 MG ER capsule Take 1 capsule (4 mg total) by mouth daily. (Patient  taking differently: Take 4 mg by mouth as needed.       ) 30 capsule 6     No current facility-administered medications for this visit.      Past Medical History:   Diagnosis Date     Anaphylactic reaction 06/10/2015    to ABX, toungue swelling     Anxiety      Bladder disorder      Colitis 2014     Obesity      PONV (postoperative nausea and vomiting)      Past Surgical History:   Procedure Laterality Date     ABDOMINAL SURGERY        SECTION       CHOLECYSTECTOMY       GASTRIC BYPASS       AZ KNEE SCOPE,SINGLE MENISECTOMY Left 2015    Procedure: LEFT KNEE ARTHROSCOPY , PARTIAL MEDIAL AND LATERAL MENISECTOMY;  Surgeon: Gato Quiñones MD;  Location: [facility] OR;  Service: Orthopedics     UMBILICAL HERNIA REPAIR      X 3     Ibuprofen; Shellfish containing products; and Rofecoxib  Family History   Problem Relation Age of Onset     Heart disease Mother      Heart attack Father      Cancer Brother      Heart attack Brother      Cancer Brother      Social History     Socioeconomic History     Marital status:      Spouse name: Not on file     Number of children: Not on file     Years of education: Not on file     Highest education level: Not on file   Occupational History     Not on file   Social Needs     Financial resource strain: Not on file     Food insecurity:     Worry: Not on file     Inability: Not on file     Transportation needs:     Medical: Not on file     Non-medical: Not on file   Tobacco Use     Smoking status: Current Every Day Smoker     Packs/day: 0.50     Years: 34.00     Pack years: 17.00     Types: Cigarettes     Smokeless tobacco: Never Used     Tobacco comment: pt declined info   Substance and Sexual Activity     Alcohol use: No     Drug use: No     Sexual activity: Not on file   Lifestyle     Physical activity:     Days per week: Not on file     Minutes per session: Not on file     Stress: Not on file   Relationships     Social connections:     Talks on  "phone: Not on file     Gets together: Not on file     Attends Sikhism service: Not on file     Active member of club or organization: Not on file     Attends meetings of clubs or organizations: Not on file     Relationship status: Not on file     Intimate partner violence:     Fear of current or ex partner: Not on file     Emotionally abused: Not on file     Physically abused: Not on file     Forced sexual activity: Not on file   Other Topics Concern     Not on file   Social History Narrative     Not on file       Review of Systems  General:  Denies problems  Eyes:  Denies problems  Ears/Nose/Throat:  Denies problems  Cardiovascular:  Denies problems  Respiratory:  Denies problems  Gastrointestinal:  Denies problems  Genitourinary:  Denies problems  Musculoskeletal:  Denies problems  Skin:  Denies problems  Neurologic:  Denies problems  Psychiatric:  Denies problems  Endocrine:  Denies problems  Heme/Lymphatic:  Denies problems  Allergic/Immunologic:  Denies problems    Objective:      Vitals:    08/05/19 1003   BP: 124/84   Pulse: (!) 55   Resp: 16   Temp: 98.6  F (37  C)   SpO2: 95%   Weight: (!) 274 lb (124.3 kg)   Height: 5' 3.5\" (1.613 m)       Physical Exam:  General Appearance: Alert, cooperative, no distress, appears stated age  Head: Normocephalic, without obvious abnormality, atraumatic  Eyes: PERRL, conjunctiva/corneas clear, EOM's intact  Ears: Normal TM's and external ear canals, both ears  Nose: Nares normal, septum midline,mucosa normal, no drainage  Throat: Lips, mucosa, and tongue normal; teeth and gums normal  Neck: Supple, symmetrical, trachea midline, no adenopathy; thyroid: not enlarged, symmetric, no tenderness/mass/nodules; no carotid bruit  Back: Symmetric, no curvature, ROM normal, no CVA tenderness  Lungs: Clear to auscultation bilaterally, respirations unlabored  Breasts: No breast masses, tenderness, asymmetry, or nipple discharge  Heart: Regular rate and rhythm, S1 and S2 normal, no " murmur, rub, or gallop, Abdomen: Soft, non-tender, bowel sounds active all four quadrants,  no masses, no organomegaly  Extremities: Mild edema, bilateral arcus veins.  Skin: Skin color, texture, turgor normal, no rashes or lesions  Lymph nodes: Cervical, supraclavicular, and axillary nodes normal  Neurologic: Normal        The following high BMI interventions were performed this visit: encouragement to exercise and dietary needs education    Results for orders placed or performed in visit on 08/05/19   Glycosylated Hemoglobin A1c   Result Value Ref Range    Hemoglobin A1c 5.6 3.5 - 6.0 %   HM2(CBC w/o Differential)   Result Value Ref Range    WBC 5.2 4.0 - 11.0 thou/uL    RBC 4.75 3.80 - 5.40 mill/uL    Hemoglobin 13.8 12.0 - 16.0 g/dL    Hematocrit 41.1 35.0 - 47.0 %    MCV 87 80 - 100 fL    MCH 29.1 27.0 - 34.0 pg    MCHC 33.5 32.0 - 36.0 g/dL    RDW 12.5 11.0 - 14.5 %    Platelets 190 140 - 440 thou/uL    MPV 8.8 7.0 - 10.0 fL       Roxy Hagen MD    This note has been dictated using voice recognition software. Any grammatical or context distortions are unintentional and inherent to the software

## 2021-06-01 ENCOUNTER — RECORDS - HEALTHEAST (OUTPATIENT)
Dept: ADMINISTRATIVE | Facility: OTHER | Age: 61
End: 2021-06-01

## 2021-06-01 ASSESSMENT — MIFFLIN-ST. JEOR: SCORE: 1794.92

## 2021-06-02 ENCOUNTER — RECORDS - HEALTHEAST (OUTPATIENT)
Dept: ADMINISTRATIVE | Facility: CLINIC | Age: 61
End: 2021-06-02

## 2021-06-02 ENCOUNTER — AMBULATORY - HEALTHEAST (OUTPATIENT)
Dept: LAB | Facility: CLINIC | Age: 61
End: 2021-06-02

## 2021-06-02 ENCOUNTER — ANESTHESIA - HEALTHEAST (OUTPATIENT)
Dept: SURGERY | Facility: CLINIC | Age: 61
End: 2021-06-02

## 2021-06-02 DIAGNOSIS — Z01.812 ENCOUNTER FOR PREOPERATIVE SCREENING LABORATORY TESTING FOR COVID-19 VIRUS: ICD-10-CM

## 2021-06-02 DIAGNOSIS — Z11.52 ENCOUNTER FOR PREOPERATIVE SCREENING LABORATORY TESTING FOR COVID-19 VIRUS: ICD-10-CM

## 2021-06-03 ENCOUNTER — RECORDS - HEALTHEAST (OUTPATIENT)
Dept: ADMINISTRATIVE | Facility: OTHER | Age: 61
End: 2021-06-03

## 2021-06-03 ENCOUNTER — SURGERY - HEALTHEAST (OUTPATIENT)
Dept: SURGERY | Facility: CLINIC | Age: 61
End: 2021-06-03
Payer: COMMERCIAL

## 2021-06-03 ENCOUNTER — TRANSFERRED RECORDS (OUTPATIENT)
Dept: HEALTH INFORMATION MANAGEMENT | Facility: CLINIC | Age: 61
End: 2021-06-03

## 2021-06-03 VITALS — BODY MASS INDEX: 46.78 KG/M2 | WEIGHT: 274 LBS | HEIGHT: 64 IN

## 2021-06-03 ASSESSMENT — MIFFLIN-ST. JEOR: SCORE: 1750.7

## 2021-06-07 ENCOUNTER — TRANSFERRED RECORDS (OUTPATIENT)
Dept: HEALTH INFORMATION MANAGEMENT | Facility: CLINIC | Age: 61
End: 2021-06-07

## 2021-06-09 ENCOUNTER — RECORDS - HEALTHEAST (OUTPATIENT)
Dept: ADMINISTRATIVE | Facility: CLINIC | Age: 61
End: 2021-06-09

## 2021-06-09 NOTE — TELEPHONE ENCOUNTER
Refill Approved    Rx renewed per Medication Renewal Policy. Medication was last renewed on 7/17/19.    Ailyn So, [facility] Triage/Med Refill 7/22/2020     Requested Prescriptions   Pending Prescriptions Disp Refills     PARoxetine (PAXIL) 40 MG tablet 90 tablet 0     Sig: Take 1 tablet (40 mg total) by mouth every morning.       [facility] Refill Protocol  Passed - 7/21/2020 10:45 AM        Passed - PCP or prescribing provider visit in last year     Last office visit with prescriber/PCP: Visit date not found OR same dept: Visit date not found OR same specialty: Visit date not found  Last physical: Visit date not found Last MTM visit: Visit date not found   Next visit within 3 mo: Visit date not found  Next physical within 3 mo: Visit date not found  Prescriber OR PCP: Modesta Starks MD  Last diagnosis associated with med order: 1. Anxiety state  - PARoxetine (PAXIL) 40 MG tablet; Take 1 tablet (40 mg total) by mouth every morning.  Dispense: 90 tablet; Refill: 0    If protocol passes may refill for 12 months if within 3 months of last provider visit (or a total of 15 months).

## 2021-06-10 NOTE — PROGRESS NOTES
Assessment/ Plan     1. Routine general medical examination at a health care facility  As far as healthcare maintenance, she is due for her Pap this year.  She is due for colonoscopy.  Mammogram will be due in October.  She is up-to-date on immunizations.  We will do fasting blood work today.  She has elevated blood pressure today without a previous diagnosis of hypertension.  I recommend that she start a walking program and come back in for blood pressure check in 1 month.  If still remains elevated, would start medication at that time.  - Gynecologic Cytology (PAP Smear)    2. Screen for colon cancer  - Ambulatory referral for Colonoscopy    3. Other specified hypothyroidism  She has had a history of some hypothyroidism.  Currently not on medication.  Will recheck TSH.  - Thyroid Stimulating Hormone (TSH)    4. Anxiety  She is been taking Paxil.  She does admit that she has had a little bit more depression lately with the pandemic.  She does not want to do therapy at this point, but will keep me posted.    5. Post-gastric Bypass For Obesity  She has a history of gastric bypass almost 20 years ago.  Unfortunately, she has gained back quite a bit of her initial weight loss.  We will check her blood work today.  - Comprehensive Metabolic Panel  - HM2(CBC w/o Differential)  - Lipid Cascade  - Vitamin D, Total (25-Hydroxy)  - Ferritin  - Vitamin B12  - Vitamin B1 (Thiamine), Whole Blood (VIT B1 WB)  - Vitamin A (Retinol), Serum or Plasma    6. Morbid obesity (H)  She admits to gaining a little bit more weight this year due to to a change in her job that she is now sedentary.  This is also increasing some pedal edema.  We discussed trying to increase her physical activity throughout the day.  - Glycosylated Hemoglobin A1c    7. Tobacco use  Unfortunately, she still smoking.      Subjective:     Riana Cui is a 59 y.o. female who presents for an annual exam.  Overall, she is struggling little bit with some  depression.  She takes Paxil for anxiety.  This is mainly due to the pandemic in some social situations.  She does not think she wants to add any new medication or do anything different.  She does not want to do therapy.  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  She likes her job, but is doing essentially no physical activity throughout the day.  She is gained a little bit extra weight this year.  She is having some pedal edema.  I had her start wearing compression stockings and that is been helpful.  We talked about trying to increase her activity level.  She had gastric bypass approximately 20 years ago and unfortunately has gained back a lot of her weight.  She is also still smoking.  She does not have any plans currently to quit.    Healthy Habits:   Regular Exercise: No  Sunscreen Use: Yes  Healthy Diet: No  Dental Visits Regularly: Yes  Seat Belt: Yes  Sexually active: Yes  Self Breast Exam Monthly:Yes  Colonoscopy: Yes  Lipid Profile: Yes  Glucose Screen: Yes  Prevention of Osteoporosis: No  Last Dexa: No        Immunization History   Administered Date(s) Administered     INFLUENZA,RECOMBINANT,INJ,PF QUADRIVALENT 18+YRS 2019     Influenza, inj, historic,unspecified 2015, 10/05/2017, 10/16/2018     Influenza, seasonal,quad inj 6-35 mos 10/11/2012     Td, Adult, Absorbed 2006     Tdap 2006, 2012     Immunization status: up to date and documented.     Gynecologic History  No LMP recorded. Patient is postmenopausal.  Contraception: none  Last Pap: 5y. Results were: normal  Last mammogram: . Results were: normal      OB History    Para Term  AB Living   2 2 2         SAB TAB Ectopic Multiple Live Births                  # Outcome Date GA Lbr Jon/2nd Weight Sex Delivery Anes PTL Lv   2 Term            1 Term                Current Outpatient Medications   Medication Sig Dispense Refill     PARoxetine (PAXIL) 40 MG tablet Take 1 tablet (40 mg total) by  mouth every morning. 90 tablet 3     No current facility-administered medications for this visit.      Past Medical History:   Diagnosis Date     Anaphylactic reaction 06/10/2015    to ABX, toungue swelling     Anxiety      Bladder disorder      Colitis 2014     Obesity      PONV (postoperative nausea and vomiting)      Past Surgical History:   Procedure Laterality Date     ABDOMINAL SURGERY        SECTION       CHOLECYSTECTOMY       GASTRIC BYPASS       CT KNEE SCOPE,SINGLE MENISECTOMY Left 2015    Procedure: LEFT KNEE ARTHROSCOPY , PARTIAL MEDIAL AND LATERAL MENISECTOMY;  Surgeon: Gato Quiñones MD;  Location: [facility] OR;  Service: Orthopedics     UMBILICAL HERNIA REPAIR      X 3     Ibuprofen; Shellfish containing products; and Rofecoxib  Family History   Problem Relation Age of Onset     Heart disease Mother      Heart attack Father      Cancer Brother      Heart attack Brother      Cancer Brother      Social History     Socioeconomic History     Marital status:      Spouse name: Not on file     Number of children: Not on file     Years of education: Not on file     Highest education level: Not on file   Occupational History     Not on file   Social Needs     Financial resource strain: Not on file     Food insecurity     Worry: Not on file     Inability: Not on file     Transportation needs     Medical: Not on file     Non-medical: Not on file   Tobacco Use     Smoking status: Current Every Day Smoker     Packs/day: 0.50     Years: 34.00     Pack years: 17.00     Types: Cigarettes     Smokeless tobacco: Never Used     Tobacco comment: pt declined info   Substance and Sexual Activity     Alcohol use: No     Drug use: No     Sexual activity: Not on file   Lifestyle     Physical activity     Days per week: Not on file     Minutes per session: Not on file     Stress: Not on file   Relationships     Social connections     Talks on phone: Not on file     Gets together: Not on  "file     Attends Synagogue service: Not on file     Active member of club or organization: Not on file     Attends meetings of clubs or organizations: Not on file     Relationship status: Not on file     Intimate partner violence     Fear of current or ex partner: Not on file     Emotionally abused: Not on file     Physically abused: Not on file     Forced sexual activity: Not on file   Other Topics Concern     Not on file   Social History Narrative     Not on file       Review of Systems  General:  Denies problems  Eyes:  Denies problems  Ears/Nose/Throat:  Denies problems  Cardiovascular:  Denies problems  Respiratory:  Denies problems  Gastrointestinal:  Denies problems  Genitourinary:  Denies problems  Musculoskeletal:  Denies problems  Skin:  Denies problems  Neurologic:  Denies problems  Psychiatric:  Denies problems  Endocrine:  Denies problems  Heme/Lymphatic:  Denies problems  Allergic/Immunologic:  Denies problems    Objective:      Vitals:    08/20/20 0904   BP: (!) 155/91   Pulse: (!) 42   Resp: 18   Temp: 97  F (36.1  C)   TempSrc: Oral   Weight: (!) 281 lb (127.5 kg)   Height: 5' 3.5\" (1.613 m)       Physical Exam:  General Appearance: Alert, cooperative, no distress, appears stated age  Head: Normocephalic, without obvious abnormality, atraumatic  Eyes: PERRL, conjunctiva/corneas clear, EOM's intact  Ears: Normal TM's and external ear canals, both ears  Nose: Nares normal, septum midline,mucosa normal, no drainage  Throat: Lips, mucosa, and tongue normal; teeth and gums normal  Neck: Supple, symmetrical, trachea midline, no adenopathy; thyroid: not enlarged, symmetric, no tenderness/mass/nodules; no carotid bruit  Back: Symmetric, no curvature, ROM normal, no CVA tenderness  Lungs: Clear to auscultation bilaterally, respirations unlabored  Breasts: No breast masses, tenderness, asymmetry, or nipple discharge  Heart: Regular rate and rhythm, S1 and S2 normal, no murmur, rub, or gallop, Abdomen: Soft, " non-tender, bowel sounds active all four quadrants,  no masses, no organomegaly  Pelvic: Normally developed genitalia with no external lesions or eruptions. Vagina and cervix show no lesions, inflammation, discharge or tenderness. Uterus normal to palpation.  No adnexal mass or tenderness.  Extremities: 1+ edema, varicose veins  Skin: Skin color, texture, turgor normal, no rashes or lesions  Lymph nodes: Cervical, supraclavicular, and axillary nodes normal  Neurologic: Normal        The following high BMI interventions were performed this visit: encouragement to exercise and dietary needs education    Results for orders placed or performed in visit on 08/05/19   Comprehensive Metabolic Panel   Result Value Ref Range    Sodium 140 136 - 145 mmol/L    Potassium 4.2 3.5 - 5.0 mmol/L    Chloride 107 98 - 107 mmol/L    CO2 24 22 - 31 mmol/L    Anion Gap, Calculation 9 5 - 18 mmol/L    Glucose 88 70 - 125 mg/dL    BUN 10 8 - 22 mg/dL    Creatinine 0.74 0.60 - 1.10 mg/dL    GFR MDRD Af Amer >60 >60 mL/min/1.73m2    GFR MDRD Non Af Amer >60 >60 mL/min/1.73m2    Bilirubin, Total 0.3 0.0 - 1.0 mg/dL    Calcium 9.0 8.5 - 10.5 mg/dL    Protein, Total 6.2 6.0 - 8.0 g/dL    Albumin 3.4 (L) 3.5 - 5.0 g/dL    Alkaline Phosphatase 85 45 - 120 U/L    AST 15 0 - 40 U/L    ALT 10 0 - 45 U/L   Glycosylated Hemoglobin A1c   Result Value Ref Range    Hemoglobin A1c 5.6 3.5 - 6.0 %   HM2(CBC w/o Differential)   Result Value Ref Range    WBC 5.2 4.0 - 11.0 thou/uL    RBC 4.75 3.80 - 5.40 mill/uL    Hemoglobin 13.8 12.0 - 16.0 g/dL    Hematocrit 41.1 35.0 - 47.0 %    MCV 87 80 - 100 fL    MCH 29.1 27.0 - 34.0 pg    MCHC 33.5 32.0 - 36.0 g/dL    RDW 12.5 11.0 - 14.5 %    Platelets 190 140 - 440 thou/uL    MPV 8.8 7.0 - 10.0 fL   Lipid Cascade   Result Value Ref Range    Cholesterol 180 <=199 mg/dL    Triglycerides 80 <=149 mg/dL    HDL Cholesterol 48 (L) >=50 mg/dL    LDL Calculated 116 <=129 mg/dL    Patient Fasting > 8hrs? Yes    Thyroid  Stimulating Hormone (TSH)   Result Value Ref Range    TSH 1.44 0.30 - 5.00 uIU/mL   Vitamin D, Total (25-Hydroxy)   Result Value Ref Range    Vitamin D, Total (25-Hydroxy) 18.7 (L) 30.0 - 80.0 ng/mL   Zinc, Serum   Result Value Ref Range    Zinc, Serum/Plasma 57.2 (L) 60.0 - 120.0 ug/dL   Vitamin B12   Result Value Ref Range    Vitamin B-12 222 213 - 816 pg/mL   Vitamin A (Retinol)   Result Value Ref Range    Vitamin A (Retinol) 0.25 (L) 0.30 - 1.20 mg/L    Vitamin A (Retinyl Palmitate) <0.02 0.00 - 0.10 mg/L    Vitamin A, Ser/Abiola - Interpretation See Note    Ferritin   Result Value Ref Range    Ferritin 12 10 - 130 ng/mL       Roxy Hagen MD    This note has been dictated using voice recognition software. Any grammatical or context distortions are unintentional and inherent to the software

## 2021-06-12 NOTE — PROGRESS NOTES
"ASSESSMENT/PLAN:   1. Cellulitis     2. Thrombophlebitis leg           Patient appears to have a cellulitis failing outpatient therapy with doxycycline, involving a superficial vein.  In the differential also includes a DVT, however I think this is less likely.  Regardless, she needs emergent venous ultrasound and blood cultures.  Discussed with patient that she needs further workup and we can do in the clinic today.  She understood and agreed.  I spoke with Dr. Higginbotham at [facility] ED who accepted patient information. Patient was stable for discharge from [facility] for private transport to ED.                SUBJECTIVE:   Riana Cui is a 56 y.o. female with a history significant for obesity, hypothyroidism, and bilateral varicose veins of lower extremities, who presents today for evaluation of left leg pain. She says that last Sunday she was diagnosed with a superficial thrombophlebitis and was prescribed doxycycline and was instructed to put heat on it. Initially, it was just on the left anterior lateral calf, but now the swelling and redness has progressed up the leg to involve the red thigh. It is \"moving fast\" in the last 24 hours. It is achy and painful.   No shortness of breath, cough, or chest pain.   She denies a history of blood clots. No recent surgeries. No recent long trips. She does not take any blood thinners. No estrogen supplementation.    She does have a history of symptomatic varicose veins in bilateral LEs, and did have surgery 8 years ago. She says \"really they should be done again.\"  She did have cortisone shots in both knees a month ago.      Past Medical History:  Patient Active Problem List   Diagnosis     Anxiety     Impingement Of The Right Shoulder     Post-gastric Bypass For Obesity     Colitis     Morbid obesity     Tobacco use     Constipation     Hypothyroid     Angioedema     Varicose veins     Symptomatic varicose veins of both lower extremities       Surgical History:  Past " Surgical History:   Procedure Laterality Date     ABDOMINAL SURGERY        SECTION       CHOLECYSTECTOMY       GASTRIC BYPASS       VA KNEE SCOPE,SINGLE MENISECTOMY Left 2015    Procedure: LEFT KNEE ARTHROSCOPY , PARTIAL MEDIAL AND LATERAL MENISECTOMY;  Surgeon: Gato Quiñones MD;  Location: [facility];  Service: Orthopedics     UMBILICAL HERNIA REPAIR      X 3           Family History:  Family History   Problem Relation Age of Onset     Heart disease Mother      Heart attack Father      Cancer Brother      Heart attack Brother      Cancer Brother      Reviewed; Non-contributory      Social History:    History   Smoking Status     Current Every Day Smoker     Packs/day: 0.50     Years: 34.00     Types: Cigarettes   Smokeless Tobacco     Never Used     Comment: pt declined info     Smoking: yes  Alcohol use: none  Other drug use: none  Occupation: works as an       Current Medications:  Current Outpatient Prescriptions on File Prior to Visit   Medication Sig Dispense Refill     acetaminophen (TYLENOL) 500 MG tablet Take 1,000 mg by mouth every 6 (six) hours as needed for pain.       EPIPEN 2-XIOMARA 0.3 mg/0.3 mL (1:1,000) atIn Inject 0.3 mg into the shoulder, thigh, or buttocks as needed (For Allergic Reactions).        oxybutynin (DITROPAN XL) 15 MG 24 hr tablet Take 15 mg by mouth daily.       oxybutynin (DITROPAN XL) 15 MG 24 hr tablet TAKE 1 TABLET BY MOUTH EVERY DAY. 90 tablet 0     PARoxetine (PAXIL) 40 MG tablet TAKE 1 TABLET BY MOUTH EVERY MORNING 90 tablet 0     PARoxetine (PAXIL) 40 MG tablet TAKE 1 TABLET BY MOUTH EVERY MORNING 90 tablet 3     oxybutynin (DITROPAN XL) 15 MG 24 hr tablet TAKE 1 TABLET BY MOUTH EVERY DAY. 30 tablet 3     No current facility-administered medications on file prior to visit.        Allergies:   Allergies   Allergen Reactions     Ibuprofen Hives     Shellfish Containing Products      SHRIMP     Rofecoxib Rash       I personally  reviewed patient's past medical, surgical, social, family history and allergies.    ROS:  Review of Systems  Negative for fever, shortness of breath, cough. Positive for leg redness, pain, swelling.      OBJECTIVE:   Vitals:    07/20/17 1936   BP: 138/88   Pulse: (!) 51   Resp: 20   Temp: 98.6  F (37  C)   TempSrc: Oral   SpO2: 98%   Weight: (!) 295 lb 8 oz (134 kg)       General Appearance:  Alert, well-appearing female in NAD. Afebrile.  Integument: On the left lower extremity on the anterolateral calf, there is an erythematous patch overlying a superficial vein.  There is erythematous streak traversing across the knee and up the medial thigh.  There is associated splotchy induration and tenderness.  Respiratory: No distress. Equal inspiration to bilateral bases. Lungs clear to ausculation bilaterally. No crackles, wheezes, rhonchi or stridor.  Cardiovascular: Regular rate and rhythm, no murmur, rub or gallop. No obvious chest wall deformities. Peripheral pulse 2+ in left DP. 1+ bilateral edema.  Musculoskeletal: No calf tenderness.

## 2021-06-12 NOTE — PROGRESS NOTES
Assessment:     1. superficial thrombophlebitis, left   2. varicose veins, bilateral   3. Insuffiencey of the right anterior accessory saphenous vein    Plan:     1. Treatment options of conservative therapy of stockings use, exercise, weight loss,  elevating legs when possible.    2. Script for compression stockings 20-30 mm hg  3. Ultrasound to evaluate legs for incompetency of both deep and superficial system .   4. Surgical treatment   Endovenous closure ofright, acessory saphenous vein in the future   Stab avulsions of bilateral lower extremities in the future if needed   Risks and benefits of surgical intervention including infection, burns, dvt,  thrombophlebitis, not closing, recurrence, numbness and nerve injury and need  for further intervention were all discused    5. Follow up: 3 months.   6. Call for any questions concerns or issues    Subjective:      Riana Cui is a 56 y.o. female  who was referred by Roxy Hagen MD  for evaluation of varicose veins. Symptoms include pain, aching, fatigue, burning, edema, dermatitis and episodes of superficial thrombophlebitis. Patient has history of leg selling, pain and vein issues that have progressed. Pain and symptoms have affected daily living and work activities needing medications. Here for evaluation today. no stocking or compression devic use    Allergies:Ibuprofen; Shellfish containing products; and Rofecoxib    Past Medical History:   Diagnosis Date     Anaphylactic reaction 06/10/2015    to ABX, toungue swelling     Anxiety      Bladder disorder      Colitis 2014     Obesity      PONV (postoperative nausea and vomiting)        Past Surgical History:   Procedure Laterality Date     ABDOMINAL SURGERY        SECTION       CHOLECYSTECTOMY       GASTRIC BYPASS       VA KNEE SCOPE,SINGLE MENISECTOMY Left 2015    Procedure: LEFT KNEE ARTHROSCOPY , PARTIAL MEDIAL AND LATERAL MENISECTOMY;  Surgeon: Gato Quiñones MD;  Location:  [facility];  Service: Orthopedics     UMBILICAL HERNIA REPAIR      X 3       Current Outpatient Prescriptions   Medication Sig Note     acetaminophen (TYLENOL) 500 MG tablet Take 1,000 mg by mouth every 6 (six) hours as needed for pain.      aspirin 81 MG EC tablet Take 81 mg by mouth daily.      EPIPEN 2-XIOMARA 0.3 mg/0.3 mL (1:1,000) atIn Inject 0.3 mg into the shoulder, thigh, or buttocks as needed (For Allergic Reactions).  7/20/2017: As needed     oxybutynin (DITROPAN XL) 15 MG 24 hr tablet Take 15 mg by mouth daily.      oxybutynin (DITROPAN XL) 15 MG 24 hr tablet TAKE 1 TABLET BY MOUTH EVERY DAY.      oxybutynin (DITROPAN XL) 15 MG 24 hr tablet TAKE 1 TABLET BY MOUTH EVERY DAY.      PARoxetine (PAXIL) 40 MG tablet TAKE 1 TABLET BY MOUTH EVERY MORNING      PARoxetine (PAXIL) 40 MG tablet TAKE 1 TABLET BY MOUTH EVERY MORNING        Family History   Problem Relation Age of Onset     Heart disease Mother      Heart attack Father      Cancer Brother      Heart attack Brother      Cancer Brother         reports that she has been smoking Cigarettes.  She has a 17.00 pack-year smoking history. She has never used smokeless tobacco. She reports that she does not drink alcohol or use illicit drugs.      Review of Systems  Pertinent items are noted in HPI.  A 12 point comprehensive review of systems was negative except as noted.      Objective:     Vitals:    07/25/17 0954   BP: 122/60   Patient Site: Left Arm   Patient Position: Sitting   Cuff Size: Adult Large   Pulse: (!) 56   Resp: 16     There is no height or weight on file to calculate BMI.    EXAM:  GENERAL: This is a well-developed 56 y.o. female who appears her stated age  HEAD: normocephalic  HEENT: Pupils equal and reactive bilaterally  MOUTH: mucus membranes intact. Normal dentation  CARDIAC: RRR without murmur  CHEST/LUNG:  Clear to auscultation bilaterally  ABDOMEN: Soft, nontender, nondistended, no masses noted   NEUROLOGIC: Focally intact, nonfocal,  alert and oriented x 3  INTEGUMENT: No open lesions or ulcers  VASCULAR: Pulses intact, symmetrical upper and lower extremities. There areskin changes consistent with chronic venous insufficiency. Varicose veins present in bilateral greater saphenous distribution. Spider veins present bilateral. Thrombophlebitis of the left leg in greater ans accessory distribution.    Imaging:    US Venous Insufficiency Legs Bilateral (Order [id number])   Imaging   Date: 7/25/2017 Department: [facility] Ultrasound [place] Released By: Ghulam Gifford RDMS, RVT Authorizing: Armando Hobson MD   Study Result   BILATERAL LOWER EXTREMITY VENOUS DUPLEX ULTRASOUND WITH INSUFFICIENCY STUDY  7/25/2017 11:34 AM     INDICATIONS: Symptomatic varicose veins. Leg pain and swelling. Recent diagnosis of left greater saphenous vein thrombosis. History of right greater saphenous vein stripping.     TECHNIQUE: Venous duplex ultrasound with gray-scale images, graded compression, and color-flow, Doppler, and spectral analysis. Abnormal reflux is defined as 600 msec for superficial veins, 350 msec for perforating veins, and 1 sec for deep veins.  COMPARISONS: Left lower extremity venous duplex ultrasound, 7/20/2017.     FINDINGS: The right greater saphenous vein has been stripped, from saphenofemoral junction to mid thigh. The remnant of the right greater saphenous vein is incompetent, from knee to distal calf. This remnant measures between 5 and 6 mm in diameter. There   is a right anterior accessory saphenous vein, which is incompetent at the saphenofemoral junction and competent in the proximal thigh. This vein measures 7 mm in diameter near the saphenofemoral junction and 3 mm in the proximal thigh. No abnormal   reflux is detected in the right lesser saphenous vein.     The left greater saphenous vein is thrombosed, from saphenofemoral junction to the ankle. No abnormal reflux is detected in the left lesser saphenous  vein.     No significant deep venous reflux is detected, and no incompetent perforating veins are identified.     There is no evidence for deep vein thrombosis. There is normal flow and compressibility in the femoral, popliteal, and posterior tibial veins.     CONCLUSIONS:   1.  Thrombosis of left greater saphenous vein.  2.  History of right greater saphenous vein stripping. The remnant of the right greater saphenous is incompetent, from knee to distal calf.  3.  Abnormal reflux in the right anterior accessory saphenous vein, at the saphenofemoral junction.              Armando Hobson MD  [facility] Surgery Dept.

## 2021-06-12 NOTE — PROGRESS NOTES
Patient here for ER follow-up. Patient was seen in ER at [facility] for 7/20/2017 and was diagnosed with superficial blood clots in her left leg. She was started on two different antibiotic and a baby aspirin. She currently is not wearing her compression stockings due to the infection.

## 2021-06-14 NOTE — PROGRESS NOTES
Assessment/ Plan     1. Routine general medical examination at a health care facility  As far as healthcare maintenance, she is due for colonoscopy.  Last was 2009 and they wanted to see her back in 5 years.  She had her mammogram this year.  Pap smear was 2015, so due in 2020.  Due for fasting blood work today.  Patient is planning on working on diet, exercise, and quitting smoking.  I encouraged her and offered support.  They will not do her knee replacement until she loses 50 pounds.  - Lipid [place] FASTING  - Comprehensive Metabolic Panel  - Thyroid Stimulating Hormone (TSH)  - HM2(CBC w/o Differential)    2. Screen for colon cancer  Patient has a history of polyps and is overdue for colonoscopy.  - Ambulatory referral for Colonoscopy    3. Hypothyroid  Patient had past history of some hypothyroidism, but this is been stable without medications.  Will recheck TSH today.    4. Tobacco use  Patient has been cutting back and plans on quitting after the first of the year.  Encouraged her to follow through.    5. Post-gastric Bypass For Obesity  Patient is about 9 years status post gastric bypass.  She is due for some blood work today.  - Lipid [place] FASTING  - Vitamin D, Total (25-Hydroxy)  - Ferritin    6. Overactive bladder  Patient has overactive bladder and the oxybutynin seems to be wearing off.  We will have her try Detrol LA generic as it seems to be covered by her insurance.  We did discuss pelvic floor strengthening for stress incontinence and she will think about that.  - tolterodine (DETROL LA) 4 MG ER capsule; Take 1 capsule (4 mg total) by mouth daily.  Dispense: 30 capsule; Refill: 6    7. Cough  Patient has had a cough for 4 months.  Exam and chest x-ray are normal.  I think this is mostly postnasal drip.  We will have her try Flonase for the next 4 weeks or so and let me know if things do not improve.  - XR Chest 2 Views    8. Anxiety  Patient's anxiety is stable on her Paxil.      Subjective:      Riana Cui is a 57 y.o. female who presents for an annual exam.  Overall, she is doing better.  She had a pretty severe bout of vertigo a few weeks ago that landed her in the emergency room.  That has resolved.  She just every once a mile will get just a little bit of vertigo.  She has been coughing for about the last 4 months.  She feels like it is mostly drainage down the back of her throat.  She does continue to smoke but is starting to back down.  She feels motivated now to quit smoking and lose some weight.  She has a grandbaby and wants to be around for her.  She is not coughing up any blood.  She is being treated for overactive bladder with oxybutynin.  She feels like it wears off and is wondering if there is anything stronger.  It looks like her insurance will cover the Detrol so we will have her try that.  We did discuss pelvic floor strengthening physical therapy for stress incontinence and she will think about it.  As far as healthcare maintenance, we did her Pap in 2015, so not due until 2020.  She had a mammogram this year.  The last colonoscopy I could find was 2009 and they wanted to see her back in 5 years due to a polyp.  Therefore, she is overdue and she is willing to do that.  She has hypothyroidism which has been fairly stable.  She feels like she has a plan in place to lose weight.  She eventually is going need a knee replacement but they want her to lose 50 pounds first.    Healthy Habits:   Regular Exercise: No  Sunscreen Use: Yes  Healthy Diet: Yes  Dental Visits Regularly: Yes  Seat Belt: Yes  Sexually active: Yes  Self Breast Exam Monthly:Yes  Colonoscopy: Yes  Lipid Profile: Yes  Glucose Screen: Yes  Prevention of Osteoporosis: Yes  Last Dexa: N/A        Immunization History   Administered Date(s) Administered     Influenza, inj, historic,unspecified 09/21/2015, 10/05/2017     Influenza, seasonal,quad inj 6-35 mos 10/11/2012     Tdap 03/14/2012     Immunization status: up to date  and documented.     Gynecologic History  No LMP recorded. Patient is postmenopausal.  Contraception: none  Last Pap: . Results were: normal  Last mammogram: . Results were: normal      OB History    Para Term  AB Living   2 2 2      SAB TAB Ectopic Multiple Live Births             # Outcome Date GA Lbr Jon/2nd Weight Sex Delivery Anes PTL Lv   2 Term            1 Term                   Current Outpatient Prescriptions   Medication Sig Dispense Refill     acetaminophen (TYLENOL) 500 MG tablet Take 1,000 mg by mouth every 6 (six) hours as needed for pain.       aspirin 81 MG EC tablet Take 81 mg by mouth daily.       EPIPEN 2-XIOMARA 0.3 mg/0.3 mL (1:1,000) atIn Inject 0.3 mg into the shoulder, thigh, or buttocks as needed (For Allergic Reactions).        meclizine (ANTIVERT) 25 mg tablet Take 1 tablet (25 mg total) by mouth 4 (four) times a day. 28 tablet 0     ondansetron (ZOFRAN ODT) 4 MG disintegrating tablet Take 1 tablet (4 mg total) by mouth every 8 (eight) hours as needed for nausea. 12 tablet 0     oxybutynin (DITROPAN XL) 15 MG 24 hr tablet Take 15 mg by mouth daily.       PARoxetine (PAXIL) 40 MG tablet TAKE 1 TABLET BY MOUTH EVERY MORNING 90 tablet 0     tolterodine (DETROL LA) 4 MG ER capsule Take 1 capsule (4 mg total) by mouth daily. 30 capsule 6     No current facility-administered medications for this visit.      Past Medical History:   Diagnosis Date     Anaphylactic reaction 06/10/2015    to ABX, toungue swelling     Anxiety      Bladder disorder      Colitis 2014     Obesity      PONV (postoperative nausea and vomiting)      Past Surgical History:   Procedure Laterality Date     ABDOMINAL SURGERY        SECTION       CHOLECYSTECTOMY       GASTRIC BYPASS       MI KNEE SCOPE,SINGLE MENISECTOMY Left 2015    Procedure: LEFT KNEE ARTHROSCOPY , PARTIAL MEDIAL AND LATERAL MENISECTOMY;  Surgeon: Gato Quiñones MD;  Location: [facility] OR;  Service:  "Orthopedics     UMBILICAL HERNIA REPAIR      X 3     Ibuprofen; Shellfish containing products; and Rofecoxib  Family History   Problem Relation Age of Onset     Heart disease Mother      Heart attack Father      Cancer Brother      Heart attack Brother      Cancer Brother      Social History     Social History     Marital status:      Spouse name: N/A     Number of children: N/A     Years of education: N/A     Occupational History     Not on file.     Social History Main Topics     Smoking status: Current Every Day Smoker     Packs/day: 0.50     Years: 34.00     Types: Cigarettes     Smokeless tobacco: Never Used      Comment: pt declined info     Alcohol use No     Drug use: No     Sexual activity: Not on file     Other Topics Concern     Not on file     Social History Narrative       Review of Systems  General:  Denies problems  Eyes:  Denies problems  Ears/Nose/Throat:  Denies problems  Cardiovascular:  Denies problems  Respiratory:  Denies problems  Gastrointestinal:  Denies problems  Genitourinary:  Denies problems  Musculoskeletal:  Denies problems  Skin:  Denies problems  Neurologic:  Denies problems  Psychiatric:  Denies problems  Endocrine:  Denies problems  Heme/Lymphatic:  Denies problems  Allergic/Immunologic:  Denies problems    Objective:      Vitals:    12/22/17 0804   BP: 116/84   Patient Site: Right Arm   Patient Position: Sitting   Cuff Size: Adult Large   Pulse: (!) 54   Resp: 14   Weight: (!) 288 lb (130.6 kg)   Height: 5' 3.5\" (1.613 m)       Physical Exam:  General Appearance: Alert, cooperative, no distress, appears stated age  Head: Normocephalic, without obvious abnormality, atraumatic  Eyes: PERRL, conjunctiva/corneas clear, EOM's intact  Ears: Normal TM's and external ear canals, both ears  Nose: Nares normal, septum midline,mucosa normal, no drainage  Throat: Lips, mucosa, and tongue normal; teeth and gums normal  Neck: Supple, symmetrical, trachea midline, no adenopathy; thyroid: " not enlarged, symmetric, no tenderness/mass/nodules; no carotid bruit  Back: Symmetric, no curvature, ROM normal, no CVA tenderness  Lungs: Clear to auscultation bilaterally, respirations unlabored  Breasts: No breast masses, tenderness, asymmetry, or nipple discharge  Heart: Regular rate and rhythm, S1 and S2 normal, no murmur, rub, or gallop, Abdomen: Soft, non-tender, bowel sounds active all four quadrants,  no masses, no organomegaly  Extremities: Trace edema, diffuse varicose veins  Skin: Skin color, texture, turgor normal, no rashes or lesions  Lymph nodes: Cervical, supraclavicular, and axillary nodes normal  Neurologic: Normal    chest x-ray: Personally reviewed, no infiltrates.    The following high BMI interventions were performed this visit: encouragement to exercise and dietary needs education    Results for orders placed or performed in visit on 12/22/17   HM2(CBC w/o Differential)   Result Value Ref Range    WBC 5.5 4.0 - 11.0 thou/uL    RBC 4.95 3.80 - 5.40 mill/uL    Hemoglobin 13.6 12.0 - 16.0 g/dL    Hematocrit 41.6 35.0 - 47.0 %    MCV 84 80 - 100 fL    MCH 27.6 27.0 - 34.0 pg    MCHC 32.8 32.0 - 36.0 g/dL    RDW 12.9 11.0 - 14.5 %    Platelets 227 140 - 440 thou/uL    MPV 8.3 7.0 - 10.0 fL       Roxy Hagen MD    This note has been dictated using voice recognition software. Any grammatical or context distortions are unintentional and inherent to the software

## 2021-06-17 NOTE — PROGRESS NOTES
[facility]  [address] [facility] [address]  Dept: [phone]  Dept Fax: [phone]  Primary Provider: Roxy Farmer MD  Pre-op Performing Provider: ROXY FARMER      PREOPERATIVE EVALUATION:  Today's date: 5/10/2021    Riana Cui is a 60 y.o. female who presents for a preoperative evaluation.    Surgical Information:  Surgery/Procedure: Colonscopy  Surgery Location: [facility]  Surgeon:Dr. Field  Surgery Date: 6/3/21  Time of Surgery: TBD  Where patient plans to recover: At home with family  Fax number for surgical facility: Note does not need to be faxed, will be available electronically in Epic.    Type of Anesthesia Anticipated: General    1. Preop general physical exam  Riana presents for preop for her screening colonoscopy due to her BMI of 47.  She is approved for colonoscopy with sedation.  Her outpatient Covid test was set up today.  She does not need blood work or EKG due to low risk procedure.  She can take her Paxil as prescribed.    2. Special screening for malignant neoplasms, colon      3. Morbid obesity (H)  BMI is 47.  She has lost 7 pounds in the last 6 months.  I encouraged her to continue working on this.    4. Post-gastric Bypass For Obesity      5. Anxiety state  Stable on her current dose of Paxil.  Refill sent.  - PARoxetine (PAXIL) 40 MG tablet; Take 1 tablet (40 mg total) by mouth every morning.  Dispense: 90 tablet; Refill: 3    6. Tobacco use  She does still smoke on a regular basis.    7. Other specified hypothyroidism  She is a prior history of hypothyroidism but currently not on medications.    Subjective     HPI related to upcoming procedure: Riana presents today for preop physical for her colonoscopy.  This is related to her elevated BMI.  Her only prescription medication currently is Paxil for anxiety.  She has no known coronary artery disease and denies chest pain or shortness of breath.  She said no recent illnesses or  exposures.    Preop Questions 5/4/2021   Have you ever had a heart attack or stroke? No   Have you ever had surgery on your heart or blood vessels, such as a stent placement, a coronary artery bypass, or surgery on an artery in your head, neck, heart, or legs? No   Do you have chest pain with activity? No   Do you have a history of  heart failure? No   Do you currently have a cold, bronchitis or symptoms of other infection? No   Do you have a cough, shortness of breath, or wheezing? No   Do you or anyone in your family have previous history of blood clots? YES - sister and brother   Do you or does anyone in your family have a serious bleeding problem such as prolonged bleeding following surgeries or cuts? No   Have you ever had problems with anemia or been told to take iron pills? YES - iron after gastric bypass   Have you had any abnormal blood loss such as black, tarry or bloody stools, or abnormal vaginal bleeding? No   Have you ever had a blood transfusion? No   Are you willing to have a blood transfusion if it is medically needed before, during, or after your surgery? Yes   Have you or any of your relatives ever had problems with anesthesia? YES - slow to wake up   Do you have sleep apnea, excessive snoring or daytime drowsiness? No   Do you have any artifical heart valves or other implanted medical devices like a pacemaker, defibrillator, or continuous glucose monitor? No   Do you have artificial joints? No   Are you allergic to latex? No   Is there any chance that you may be pregnant? No     Health Care Directive:  Patient does not have a Health Care Directive or Living Will: Discussed advance care planning with patient; information given to patient to review.    Preoperative Review of :    reviewed - no record of controlled substances prescribed.    See problem list for active medical problems.  Problems all longstanding and stable, except as noted/documented.  See ROS for pertinent symptoms related to  these conditions.      Review of Systems  CONSTITUTIONAL: NEGATIVE for fever, chills, change in weight  INTEGUMENTARY/SKIN: NEGATIVE for worrisome rashes, moles or lesions  EYES: NEGATIVE for vision changes or irritation  ENT/MOUTH: NEGATIVE for ear, mouth and throat problems  RESP: NEGATIVE for significant cough or SOB  BREAST: NEGATIVE for masses, tenderness or discharge  CV: NEGATIVE for chest pain, palpitations or peripheral edema  GI: NEGATIVE for nausea, abdominal pain, heartburn, or change in bowel habits  : NEGATIVE for frequency, dysuria, or hematuria  MUSCULOSKELETAL: NEGATIVE for significant arthralgias or myalgia  NEURO: NEGATIVE for weakness, dizziness or paresthesias  ENDOCRINE: NEGATIVE for temperature intolerance, skin/hair changes  HEME: NEGATIVE for bleeding problems  PSYCHIATRIC: NEGATIVE for changes in mood or affect    Patient Active Problem List    Diagnosis Date Noted     Varicose veins 2015     Symptomatic varicose veins of both lower extremities 2015     Angioedema 06/10/2015     Hypothyroid 2014     Colitis 2014     Morbid obesity (H) 2014     Tobacco use 2014     Anxiety      Impingement Of The Right Shoulder      Post-gastric Bypass For Obesity      Past Medical History:   Diagnosis Date     Anaphylactic reaction 06/10/2015    to ABX, toungue swelling     Anxiety      Bladder disorder      Colitis 2014     Obesity      PONV (postoperative nausea and vomiting)      Past Surgical History:   Procedure Laterality Date     ABDOMINAL SURGERY        SECTION       CHOLECYSTECTOMY       GASTRIC BYPASS       CT KNEE SCOPE,SINGLE MENISECTOMY Left 2015    Procedure: LEFT KNEE ARTHROSCOPY , PARTIAL MEDIAL AND LATERAL MENISECTOMY;  Surgeon: Gato Quiñones MD;  Location: [facility];  Service: Orthopedics     UMBILICAL HERNIA REPAIR      X 3     Current Outpatient Medications   Medication Sig Dispense Refill     PARoxetine (PAXIL)  "40 MG tablet Take 1 tablet (40 mg total) by mouth every morning. 90 tablet 3     No current facility-administered medications for this visit.        Allergies   Allergen Reactions     Ibuprofen Hives     Shellfish Containing Products      SHRIMP     Rofecoxib Rash       Social History     Tobacco Use     Smoking status: Current Every Day Smoker     Packs/day: 0.50     Years: 34.00     Pack years: 17.00     Types: Cigarettes     Smokeless tobacco: Never Used     Tobacco comment: pt declined info   Substance Use Topics     Alcohol use: No        Social History     Substance and Sexual Activity   Drug Use No        Objective     /79   Pulse (!) 48   Resp 16   Ht 5' 3.5\" (1.613 m)   Wt (!) 274 lb 6.4 oz (124.5 kg)   LMP  (LMP Unknown)   BMI 47.85 kg/m    Physical Exam    GENERAL APPEARANCE: healthy, alert and no distress     EYES: EOMI, PERRL     HENT: ear canals and TM's normal and nose and mouth without ulcers or lesions     NECK: no adenopathy, no asymmetry, masses, or scars and thyroid normal to palpation     RESP: lungs clear to auscultation - no rales, rhonchi or wheezes     CV: regular rates and rhythm, normal S1 S2, no S3 or S4 and no murmur, click or rub     ABDOMEN:  soft, nontender, no HSM or masses and bowel sounds normal     MS: extremities normal- no gross deformities noted, no evidence of inflammation in joints, FROM in all extremities.     SKIN: no suspicious lesions or rashes     NEURO: Normal strength and tone, sensory exam grossly normal, mentation intact and speech normal     PSYCH: mentation appears normal. and affect normal/bright     LYMPHATICS: No cervical adenopathy    Recent Labs   Lab Test 08/20/20  0958 08/05/19  1041   HGB 13.6 13.8    190    140   K 4.1 4.2   CREATININE 0.74 0.74        PRE-OP Diagnostics:   No labs were ordered during this visit.  No EKG required for low risk surgery (cataract, skin procedure, breast biopsy, etc).    REVISED CARDIAC RISK INDEX " (RCRI)   The patient has the following serious cardiovascular risks for perioperative complications:   - No serious cardiac risks = 0 points    RCRI INTERPRETATION: 0 points: Class I (very low risk - 0.4% complication rate)         Signed Electronically by: Roxy Hagen MD    Copy of this evaluation report is provided to requesting physician.

## 2021-06-17 NOTE — PATIENT INSTRUCTIONS - HE

## 2021-06-20 NOTE — LETTER
Letter by Roxy Hagen MD at      Author: Roxy Hagen MD Service: -- Author Type: --    Filed:  Encounter Date: 1/24/2020 Status: (Other)         Riana Cui  [address] [facility] [address]             January 24, 2020        Dear Riana Cui :    Dr. Hagen was reviewing your chart and noticed that you are due for a colonoscopy.      To prevent delays in your care, please call [facility] [phone] to schedule a screening colonoscopy.    If you had a colonoscopy performed within the last 10 years at a different facility please contact the [facility] at [phone] so we can get the report.    Sincerely,  Your care team at [facility]

## 2021-06-26 NOTE — ANESTHESIA PREPROCEDURE EVALUATION
Anesthesia Evaluation      Patient summary reviewed   History of anesthetic complications (PONV)     Airway   Mallampati: II  Neck ROM: full   Pulmonary - normal exam   (+) a smoker    ROS comment: Angioedema from Antibiotics.                         Cardiovascular - negative ROS and normal exam   Neuro/Psych    (+) anxiety/panic attacks,     Endo/Other    (+) hypothyroidism, arthritis, obesity (Morbid obesity),      GI/Hepatic/Renal      Comments: Bladder disorder NOS.  Colitis NOS.  Post gastric bypass.     Other findings: PONV.      Dental - normal exam                          Anesthesia Plan  Planned anesthetic: MAC  Decadron, Zofran.  Diprivan infusion.  ASA 3     Anesthetic plan and risks discussed with: patient  Anesthesia plan special considerations: antiemetics,   Post-op plan: routine recovery

## 2021-06-26 NOTE — ANESTHESIA CARE TRANSFER NOTE
Last vitals:   Vitals:    06/03/21 0804   BP: 118/56   Pulse: (!) 57   Resp: 12   Temp: 36.2  C (97.2  F)   SpO2: 94%     Patient's level of consciousness is awake  Spontaneous respirations: yes  Maintains airway independently: yes  Dentition unchanged: yes  Oropharynx: oropharynx clear of all foreign objects    QCDR Measures:  ASA# 20 - Surgical Safety Checklist: WHO surgical safety checklist completed prior to induction    PQRS# 430 - Adult PONV Prevention: 4558F - Pt received => 2 anti-emetic agents (different classes) preop & intraop  ASA# 8 - Peds PONV Prevention: NA - Not pediatric patient, not GA or 2 or more risk factors NOT present  PQRS# 424 - Chelo-op Temp Management: 4559F - At least one body temp DOCUMENTED => 35.5C or 95.9F within required timeframe  PQRS# 426 - PACU Transfer Protocol: - Transfer of care checklist used  ASA# 14 - Acute Post-op Pain: ASA14B - Patient did NOT experience pain >= 7 out of 10

## 2021-06-26 NOTE — ANESTHESIA POSTPROCEDURE EVALUATION
Patient: Riana Cui  Procedure(s):  Colonoscopy with polypectomies  Anesthesia type: MAC    Patient location: Phase II Recovery  Last vitals:   Vitals Value Taken Time   /63 06/03/21 0810   Temp 36.2  C (97.2  F) 06/03/21 0804   Pulse 52 06/03/21 0811   Resp 16 06/03/21 0810   SpO2 95 % 06/03/21 0811   Vitals shown include unvalidated device data.  Post vital signs: stable  Level of consciousness: awake and responds to simple questions  Post-anesthesia pain: pain controlled  Post-anesthesia nausea and vomiting: no  Pulmonary: unassisted, return to baseline  Cardiovascular: stable and blood pressure at baseline  Hydration: adequate  Anesthetic events: no    QCDR Measures:  ASA# 11 - Chelo-op Cardiac Arrest: ASA11B - Patient did NOT experience unanticipated cardiac arrest  ASA# 12 - Chelo-op Mortality Rate: ASA12B - Patient did NOT die  ASA# 13 - PACU Re-Intubation Rate: NA - No ETT / LMA used for case  ASA# 10 - Composite Anes Safety: ASA10A - No serious adverse event    Additional Notes:

## 2021-07-03 NOTE — ADDENDUM NOTE
Addendum Note by Dyllan Richards LPN at 7/17/2019  4:23 PM     Author: Dyllan Richards LPN Service: -- Author Type: Licensed Nurse    Filed: 7/17/2019  4:23 PM Encounter Date: 7/15/2019 Status: Signed    : Dyllan Richards LPN (Licensed Nurse)    Addended by: DYLLAN RICHARDS on: 7/17/2019 04:23 PM        Modules accepted: Orders

## 2021-07-06 VITALS
HEIGHT: 64 IN | BODY MASS INDEX: 47.78 KG/M2 | BODY MASS INDEX: 47.78 KG/M2 | BODY MASS INDEX: 46.38 KG/M2 | WEIGHT: 274 LBS | WEIGHT: 266 LBS

## 2021-07-06 VITALS — BODY MASS INDEX: 47.12 KG/M2 | HEIGHT: 63 IN

## 2021-10-24 ENCOUNTER — HEALTH MAINTENANCE LETTER (OUTPATIENT)
Age: 61
End: 2021-10-24

## 2021-12-19 ENCOUNTER — HEALTH MAINTENANCE LETTER (OUTPATIENT)
Age: 61
End: 2021-12-19

## 2022-01-12 VITALS — BODY MASS INDEX: 47.13 KG/M2 | HEIGHT: 63 IN | WEIGHT: 266 LBS

## 2022-01-18 ENCOUNTER — TRANSFERRED RECORDS (OUTPATIENT)
Dept: HEALTH INFORMATION MANAGEMENT | Facility: CLINIC | Age: 62
End: 2022-01-18
Payer: COMMERCIAL

## 2022-01-18 VITALS
RESPIRATION RATE: 16 BRPM | BODY MASS INDEX: 46.85 KG/M2 | WEIGHT: 274.4 LBS | HEIGHT: 64 IN | DIASTOLIC BLOOD PRESSURE: 79 MMHG | HEART RATE: 48 BPM | SYSTOLIC BLOOD PRESSURE: 130 MMHG

## 2022-01-18 VITALS
HEART RATE: 42 BPM | TEMPERATURE: 97 F | DIASTOLIC BLOOD PRESSURE: 88 MMHG | HEIGHT: 64 IN | SYSTOLIC BLOOD PRESSURE: 148 MMHG | WEIGHT: 281 LBS | RESPIRATION RATE: 18 BRPM | BODY MASS INDEX: 47.97 KG/M2

## 2022-01-18 ASSESSMENT — PATIENT HEALTH QUESTIONNAIRE - PHQ9: SUM OF ALL RESPONSES TO PHQ QUESTIONS 1-9: 1

## 2022-02-02 ENCOUNTER — ALLIED HEALTH/NURSE VISIT (OUTPATIENT)
Dept: FAMILY MEDICINE | Facility: CLINIC | Age: 62
End: 2022-02-02
Payer: COMMERCIAL

## 2022-02-02 VITALS — SYSTOLIC BLOOD PRESSURE: 173 MMHG | DIASTOLIC BLOOD PRESSURE: 84 MMHG | HEART RATE: 53 BPM

## 2022-02-02 DIAGNOSIS — R03.0 ELEVATED BLOOD PRESSURE READING WITHOUT DIAGNOSIS OF HYPERTENSION: Primary | ICD-10-CM

## 2022-02-02 PROCEDURE — 99207 PR NO CHARGE NURSE ONLY: CPT

## 2022-02-02 NOTE — PROGRESS NOTES
Follow Up Blood Pressure Check    Riana Cui is a 61 year old female recommended to follow up for blood pressure check by Dr Hagen . Anihypertensive medications and adherence were verified: NA.     Reason for visit: BP high at the dentist office    Medication change at last visit: NA    Today's Vitals:   Vitals:    02/02/22 1304 02/02/22 1308 02/02/22 1319   BP: (!) 172/90 (!) 169/84 (!) 173/84   BP Location: Right arm Right arm Right arm   Patient Position: Sitting Sitting Sitting   Cuff Size: Thigh Thigh Thigh   Pulse: 55 56 53     BP also checked with large cuff on wrist with a reading of 135/75, pulse 53   &  144/82, pulse 58  Manually check wrist BP with large cuff with reading of 136/86     Home blood pressure readings brought in today:   NO     Lowest blood pressure today is <180/<110 and they deny signs or symptoms of new onset: Having more frequent headaches in the past month, sometimes noticing some vision changes.  Please inform patient of his/her blood pressure today.  If they are asymptomatic, the patient is to continue current medications.  This message will be routed to their provider, and they will be notified if a change in medication is recommended.      Sylvia Crenshaw    Current Outpatient Medications   Medication Sig Dispense Refill     doxycycline (VIBRAMYCIN) 100 MG capsule Take 1 capsule (100 mg) by mouth 2 times daily 20 capsule 0     oxybutynin chloride 15 MG TB24 Take 15 mg by mouth daily       PAROXETINE HCL PO Take 40 mg by mouth daily

## 2022-02-23 ENCOUNTER — OFFICE VISIT (OUTPATIENT)
Dept: FAMILY MEDICINE | Facility: CLINIC | Age: 62
End: 2022-02-23
Payer: COMMERCIAL

## 2022-02-23 VITALS
WEIGHT: 268.2 LBS | BODY MASS INDEX: 47.52 KG/M2 | HEIGHT: 63 IN | DIASTOLIC BLOOD PRESSURE: 73 MMHG | SYSTOLIC BLOOD PRESSURE: 155 MMHG | RESPIRATION RATE: 16 BRPM | HEART RATE: 51 BPM

## 2022-02-23 DIAGNOSIS — Z12.31 VISIT FOR SCREENING MAMMOGRAM: ICD-10-CM

## 2022-02-23 DIAGNOSIS — Z87.891 PERSONAL HISTORY OF TOBACCO USE, PRESENTING HAZARDS TO HEALTH: ICD-10-CM

## 2022-02-23 DIAGNOSIS — I10 ESSENTIAL HYPERTENSION: Primary | ICD-10-CM

## 2022-02-23 DIAGNOSIS — N32.81 OVERACTIVE BLADDER: ICD-10-CM

## 2022-02-23 DIAGNOSIS — F41.1 ANXIETY STATE: ICD-10-CM

## 2022-02-23 DIAGNOSIS — E66.01 MORBID OBESITY (H): ICD-10-CM

## 2022-02-23 PROCEDURE — 99214 OFFICE O/P EST MOD 30 MIN: CPT | Performed by: FAMILY MEDICINE

## 2022-02-23 RX ORDER — LISINOPRIL 10 MG/1
10 TABLET ORAL DAILY
Qty: 30 TABLET | Refills: 3 | Status: SHIPPED | OUTPATIENT
Start: 2022-02-23 | End: 2022-09-08

## 2022-02-23 RX ORDER — OXYBUTYNIN CHLORIDE 15 MG/1
15 TABLET, EXTENDED RELEASE ORAL DAILY
Qty: 30 TABLET | Refills: 3 | Status: SHIPPED | OUTPATIENT
Start: 2022-02-23 | End: 2022-09-08

## 2022-02-23 NOTE — PROGRESS NOTES
Assessment/ Plan     1. Essential hypertension  Riana has had more persistent elevated blood pressures over the last couple of months. I discussed at this point I think we need to start some medication as she has been having some headaches. We will start with lisinopril 10 mg. Discussed potential adverse side effects. She will monitor at home. She is going to see me back in 1 month for weight management intake and we can recheck her blood pressure at that time. She will also be due for labs at that time.  - lisinopril (ZESTRIL) 10 MG tablet; Take 1 tablet (10 mg) by mouth daily  Dispense: 30 tablet; Refill: 3    2. Visit for screening mammogram  - MA SCREENING DIGITAL BILAT - Future  (s+30); Future    3. Overactive bladder  She would like to restart the medication for overactive bladder.  - oxybutynin ER (DITROPAN XL) 15 MG 24 hr tablet; Take 1 tablet (15 mg) by mouth daily  Dispense: 30 tablet; Refill: 3    4. Morbid obesity (H)  She has been working on some weight loss at home by herself but feels like she needs more help. Her comorbidities include now hypertension. She will follow up with me for comprehensive weight management intake in approximately 1 month. She will be due for her bariatric labs at that time as well.    5. Anxiety  This is been under good control with 40 mg of Paxil. I discussed with patient that this can cause weight gain. She is unsure if she really needs to keep taking it. We will have her cut this in half to 20 mg for the next 1 month and will discuss when she comes in for follow-up how she is feeling. We could consider switching her over to a different antianxiety medication.    6. Personal history of tobacco use, presenting hazards to health  She has some concerns about overall cancer risk. She would meet criteria for lung cancer screening but I asked that she check with her insurance before we order this.      Subjective:      Riana Cui is a 61 year old female who presents for  follow-up of high blood pressures. She states a couple of weeks ago she had been at the dentist and her blood pressure was really high. She came in here and had it rechecked and it was high but she has bigger arm so we have to make sure you are using the right size cuff. She was having headaches at the time as well. We have been kind of watching and has been in the borderline range. I think at this point we should just start medication. She has been trying to work on her diet and has lost a little bit of weight. She is trying to lose weight for her son's wedding this summer. She is interested in coming back and seeing me for this. She would like refills on her overactive bladder medication as this has gotten worse for her. She has been on Paxil for many years. This predates when she started seeing me. I discussed with her that sometimes this can cause weight gain and it may be worth weaning her off of it. She states she stopped it in the past and seemed to do okay. She did go through with her colonoscopy, but she still due for mammogram so I put the order in. She is wondering about any other cancer screenings. She does meet criteria for lung cancer screening. She is at least a 30-pack-year history and is still currently smoking.    Relevant past medical, family, surgical, and social history reviewed with patient, unless noted in HPI, not pertinent for this visit.  Medications were discussed and reconciled.   Review of Systems   A 12 point comprehensive review of systems was negative except as noted.      Current Outpatient Medications   Medication Sig Dispense Refill     lisinopril (ZESTRIL) 10 MG tablet Take 1 tablet (10 mg) by mouth daily 30 tablet 3     oxybutynin ER (DITROPAN XL) 15 MG 24 hr tablet Take 1 tablet (15 mg) by mouth daily 30 tablet 3     PAROXETINE HCL PO Take 40 mg by mouth daily           Objective:     BP (!) 155/73 (BP Location: Other (Comment), Patient Position: Left side, Cuff Size: Adult  "Regular)   Pulse 51   Resp 16   Ht 1.6 m (5' 3\")   Wt 121.7 kg (268 lb 3.2 oz)   LMP  (LMP Unknown)   BMI 47.51 kg/m      Body mass index is 47.51 kg/m .       General appearance: alert, appears stated age and cooperative  Lungs: clear to auscultation bilaterally  Heart: regular rate and rhythm, S1, S2 normal, no murmur, click, rub or gallop      No results found for this or any previous visit (from the past 168 hour(s)).       This note has been dictated using voice recognition software. Any grammatical or context distortions are unintentional and inherent to the software  Answers for HPI/ROS submitted by the patient on 2/20/2022  Do you check your blood pressure regularly outside of the clinic?: Yes  Are your blood pressures ever more than 140 on the top number (systolic) OR more than 90 on the bottom number (diastolic)? (For example, greater than 140/90): Yes  Are you following a low salt diet?: No  Headache Symptoms are: no change  How often are you getting headaches or migraines? : Once a day  Are you able to do normal daily activities when you have a migraine?: Yes  Migraine Rescue/Relief Medications:: Tylenol  Effectiveness of rescue/relief medications:: I get only a small amount of relief  Migraine Preventative Medications:: no medications to prevent migraines  ER or UC Visits:: 0 times  How many servings of fruits and vegetables do you eat daily?: 2-3  On average, how many sweetened beverages do you drink each day (Examples: soda, juice, sweet tea, etc.  Do NOT count diet or artificially sweetened beverages)?: 3  How many minutes a day do you exercise enough to make your heart beat faster?: 9 or less  How many days a week do you exercise enough to make your heart beat faster?: 3 or less  How many days per week do you miss taking your medication?: 0      "

## 2022-02-24 NOTE — PROGRESS NOTES
"Riana Cui is a 59 y.o. female who is being evaluated via a billable video visit.      The patient has been notified of following:     \"This video visit will be conducted via a call between you and your physician/provider. We have found that certain health care needs can be provided without the need for an in-person physical exam.  This service lets us provide the care you need with a video conversation.  If a prescription is necessary we can send it directly to your pharmacy.  If lab work is needed we can place an order for that and you can then stop by our lab to have the test done at a later time.    Video visits are billed at different rates depending on your insurance coverage. Please reach out to your insurance provider with any questions.    If during the course of the call the physician/provider feels a video visit is not appropriate, you will not be charged for this service.\"    Patient has given verbal consent to a Video visit? Yes  How would you like to obtain your AVS? AVS Preference: MyChart.  Patient would like the video invitation sent by: Text to cell phone: [phone]  Will anyone else be joining your video visit? No      Video Start Time: 8:00    Additional provider notes:  Assessment/ Plan     1. Pedal edema  Riana has had a several week history of worsening pedal edema.  I think this is likely multifactorial.  He has venous stasis at baseline due varicose veins.  She has been more sedentary since working from home and has gained some weight.  She has been eating more salty snacks.  I recommend that she try to wear her compression stockings on a regular basis, cut back on salt, work on weight loss, and get nothing throughout the day.  She is due to see me for a complete physical exam next month, so we will check on progress at that time.  No concern for DVT.      Subjective:       Riana Cui is a 59 y.o. female who presents for a virtual visit for pedal edema.  She states she has " Upper Extremity H&P    CC: right hand mass    HPI:  Leena Arnold is a 76 year old male who presents for evaluation of his right hand mass. A few months ago he was grabbing something out of his truck seat when he bumped the back side of his hand. Since this time he has noticed a mass over the dorsal hand. The mass size has been consistent. It is not painful, but can be uncomfortable at times. He denies numbness or tingling to the right hand. He is a retired .     PMH:   Past Medical History:   Diagnosis Date   • Acute medial meniscal tear, left, subsequent encounter 07/21/2021   • Chronic bilateral low back pain without sciatica 10/21/2020   • Chronic migraine    • Diminished pulses in lower extremity 10/21/2020   • RLS (restless legs syndrome) 08/06/2021       Surgical Hx:   Past Surgical History:   Procedure Laterality Date   • Arthroscopy knee medial or lat Left 08/31/2021    LT knee PMM; GR 3 Med and PF CM; 8/31/21   • Laminec/facetect/foramin,lumbar     • Total knee replacement Right        Family Hx:   Family History   Problem Relation Age of Onset   • Allergic Rhinitis Daughter    • Allergic Rhinitis Son    • Allergic Rhinitis Son    • Allergic Rhinitis Daughter        Social Hx:   Social History     Socioeconomic History   • Marital status: /Civil Union     Spouse name: Not on file   • Number of children: Not on file   • Years of education: Not on file   • Highest education level: Not on file   Occupational History   • Not on file   Tobacco Use   • Smoking status: Never Smoker   • Smokeless tobacco: Never Used   Vaping Use   • Vaping Use: never used   Substance and Sexual Activity   • Alcohol use: Not Currently   • Drug use: Never   • Sexual activity: Not on file   Other Topics Concern   • Not on file   Social History Narrative    Retired .      Social Determinants of Health     Financial Resource Strain: Not on file   Food Insecurity: Not on file   Transportation Needs: Not on file    Physical Activity: Not on file   Stress: Not on file   Social Connections: Not on file   Intimate Partner Violence: Not At Risk   • Social Determinants: Intimate Partner Violence Past Fear: No   • Social Determinants: Intimate Partner Violence Current Fear: No       Meds:   Current Outpatient Medications   Medication Sig Dispense Refill   • rOPINIRole (REQUIP) 0.5 MG tablet Take with 0.25 mg tab to equal 0.75 mg nightly. 90 tablet 0   • rOPINIRole (Requip) 0.25 MG tablet Take with 0.5 mg tab to equal 0.75 mg nightly. 90 tablet 0   • finasteride (PROSCAR) 5 MG tablet TAKE 1 TABLET BY MOUTH  DAILY 90 tablet 0   • gabapentin (NEURONTIN) 100 MG capsule TAKE 2 CAPSULES BY MOUTH 3  TIMES DAILY 180 capsule 0   • gabapentin (NEURONTIN) 300 MG capsule TAKE 1 CAPSULE BY MOUTH AT  BEDTIME AS DIRECTED 450 capsule 0   • dutasteride (AVODART) 0.5 MG capsule      • HYDROcodone-acetaminophen (NORCO)  MG per tablet Take 1 tablet by mouth every 8 hours as needed for Pain. 7 tablet 0   • topiramate (TOPAMAX) 25 MG tablet Take 1 tablet by mouth daily. 90 tablet 1   • zoster vaccine recomb adjuvanted (Shingrix) 50 MCG/0.5ML injection Repeat dose in 2 to 6 months (unless 1 dose already given), for a total of 2 doses. 1 each 1   • atorvastatin (LIPITOR) 40 MG tablet TAKE 1 TABLET BY MOUTH  DAILY 90 tablet 3   • tamsulosin (FLOMAX) 0.4 MG Cap TAKE 1 CAPSULE BY MOUTH  DAILY AFTER A MEAL 90 capsule 3   • sumatriptan (IMITREX) 100 MG tablet Take 1 tablet by mouth as needed for Migraine. 45 tablets =90 day supply 45 tablet 1   • Acetaminophen (TYLENOL PO) Take 750 mg by mouth. Indications: takes 750 mg 3-4 times per day     • polyethylene glycol (MIRALAX) 17 g packet Take 17 g by mouth.     • DISPENSE Indications: peppermint essential oil        No current facility-administered medications for this visit.       Allergies:   ALLERGIES:   Allergen Reactions   • Caffeine HEADACHES   • Dust Mite Extract Other (See Comments)     Stuffy nose  had worsening symptoms now for several weeks.  She is been wearing compression stockings, but more loose pair.  She has been working from home since April.  She admits that she is gained quite a bit of weight.  She has been snacking throughout the day, crackers, chips, pretzels.  She weighed herself this morning and she is up about 10 pounds.  She does have a stronger pair compression stockings that she got from the vascular center, but she states they are uncomfortable to wear.  The swelling is bilateral and uniform.  There is no redness, swelling, or warmth.  She is never had a DVT.  She does have pretty significant varicose veins at baseline.    Relevant past medical, family, surgical, and social history reviewed with patient, unless noted in HPI, not pertinent for this visit.  Medications were discussed and reconciled.   Review of Systems   A 12 point comprehensive review of systems was negative except as noted.      Current Outpatient Medications   Medication Sig Dispense Refill     PARoxetine (PAXIL) 40 MG tablet Take 1 tablet (40 mg total) by mouth every morning. 90 tablet 0     No current facility-administered medications for this visit.        Objective:      There were no vitals taken for this visit.      General appearance: alert, appears stated age and cooperative  Extremities: No visible edema feet, ankles, shins.  No redness noted.           No results found for this or any previous visit (from the past 168 hour(s)).       This note has been dictated using voice recognition software. Any grammatical or context distortions are unintentional and inherent to the software      Video-Visit Details    Type of service:  Video Visit    Video End Time (time video stopped): 8:18  Originating Location (pt. Location): Home    Distant Location (provider location):  [facility]     Platform used for Video Visit: Janet Hagen MD       • Flavoring Agent Other (See Comments)     Natural flavors-- severe headaches   • Monosodium Glutamate   (Food Or Med) HEADACHES     Other reaction(s): Other (See Comments)  Berries, hard cheese, broccoli, black beans-- severe headaches   • Pollen Extract Other (See Comments)     Stuffy nose   • Protein Hydrolysate HEADACHES     Hydrolyzed Protein   • Tree Nuts    (Food) HEADACHES     All nuts       ROS: no chest pain or shortness of breath.    PE:  Constitutional: awake and alert   Skin: intact. Palpable mobile mass present over the index finger extensor tendon just proximal to the MP joint. The mass moves with flexion and extension of the index finger. +transillumination  Pulm: Normal respiratory effort   CV: the upper extremitites are withou edema  Neuro: Sensation intact to light touch distally.   Musculoskeletal:  He is able to make a fist and extend all digits straight. He is able to hold the index finger straight against resistance.     XR: XR demonstrates mild arthritis of IF MP joint and arthritis of CMC joint.     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     Plan: I discussed with the patient my findings and the treatment options ranging from conservative to aggressive. He would like to try to drain the cyst today. We discussed risks, benefits, aftercare, limitations, and alterantives to needle aspiration and he elects to proceed. We did specifically discuss the risk of recurrence of the mass and he understands this. He is also provided with an ACE wrap to offer some compression of the mass.     Procedure:    A discussion was held with the patient regarding the risks, benefits, aftercare, limitations and alternatives to the aspiration, verbal informed consent was obtained and documented.    A brief timeout was held in which the correct site was identified with the patient's participation.  The site was sterilely prepped.  A small amount of 1% Lidocaine was  used to anesthetize the skin overlying the dorsal index finger mass. The mass was then entered with an 18g needle and 0.5ml of clear gelatinous fluid was aspirated from the mass. The mass was then felt to be completely decompressed. He had brisk capilary refill and full ROM of the index finger following the aspiration. A dressing was applied and aftercare instructions reviewed.     XRs do not need to be obtained prior to next visit

## 2022-04-08 ENCOUNTER — HOSPITAL ENCOUNTER (OUTPATIENT)
Dept: MAMMOGRAPHY | Facility: CLINIC | Age: 62
Discharge: HOME | End: 2022-04-08
Attending: FAMILY MEDICINE | Admitting: FAMILY MEDICINE
Payer: COMMERCIAL

## 2022-04-08 DIAGNOSIS — Z12.31 VISIT FOR SCREENING MAMMOGRAM: ICD-10-CM

## 2022-04-08 PROCEDURE — 77067 SCR MAMMO BI INCL CAD: CPT

## 2022-05-24 ENCOUNTER — HOSPITAL ENCOUNTER (EMERGENCY)
Facility: HOSPITAL | Age: 62
Discharge: HOME | End: 2022-05-24
Attending: STUDENT IN AN ORGANIZED HEALTH CARE EDUCATION/TRAINING PROGRAM | Admitting: STUDENT IN AN ORGANIZED HEALTH CARE EDUCATION/TRAINING PROGRAM
Payer: COMMERCIAL

## 2022-05-24 ENCOUNTER — APPOINTMENT (OUTPATIENT)
Dept: RADIOLOGY | Facility: HOSPITAL | Age: 62
End: 2022-05-24
Attending: STUDENT IN AN ORGANIZED HEALTH CARE EDUCATION/TRAINING PROGRAM
Payer: COMMERCIAL

## 2022-05-24 VITALS
SYSTOLIC BLOOD PRESSURE: 118 MMHG | DIASTOLIC BLOOD PRESSURE: 65 MMHG | WEIGHT: 263 LBS | OXYGEN SATURATION: 100 % | RESPIRATION RATE: 18 BRPM | HEART RATE: 63 BPM | HEIGHT: 63 IN | BODY MASS INDEX: 46.6 KG/M2 | TEMPERATURE: 99.6 F

## 2022-05-24 DIAGNOSIS — J18.9 PNEUMONIA OF RIGHT LOWER LOBE DUE TO INFECTIOUS ORGANISM: ICD-10-CM

## 2022-05-24 DIAGNOSIS — U07.1 PNEUMONIA DUE TO 2019 NOVEL CORONAVIRUS: ICD-10-CM

## 2022-05-24 DIAGNOSIS — J12.82 PNEUMONIA DUE TO 2019 NOVEL CORONAVIRUS: ICD-10-CM

## 2022-05-24 LAB
ALBUMIN SERPL-MCNC: 3.2 G/DL (ref 3.5–5)
ALP SERPL-CCNC: 82 U/L (ref 45–120)
ALT SERPL W P-5'-P-CCNC: 13 U/L (ref 0–45)
ANION GAP SERPL CALCULATED.3IONS-SCNC: 8 MMOL/L (ref 5–18)
AST SERPL W P-5'-P-CCNC: 18 U/L (ref 0–40)
ATRIAL RATE - MUSE: 65 BPM
BASOPHILS # BLD AUTO: 0.1 10E3/UL (ref 0–0.2)
BASOPHILS NFR BLD AUTO: 1 %
BILIRUB SERPL-MCNC: 0.4 MG/DL (ref 0–1)
BUN SERPL-MCNC: 14 MG/DL (ref 8–22)
CALCIUM SERPL-MCNC: 9 MG/DL (ref 8.5–10.5)
CHLORIDE BLD-SCNC: 106 MMOL/L (ref 98–107)
CO2 SERPL-SCNC: 24 MMOL/L (ref 22–31)
CREAT SERPL-MCNC: 0.78 MG/DL (ref 0.6–1.1)
DIASTOLIC BLOOD PRESSURE - MUSE: NORMAL MMHG
EOSINOPHIL # BLD AUTO: 0.1 10E3/UL (ref 0–0.7)
EOSINOPHIL NFR BLD AUTO: 2 %
ERYTHROCYTE [DISTWIDTH] IN BLOOD BY AUTOMATED COUNT: 13.2 % (ref 10–15)
FLUAV RNA SPEC QL NAA+PROBE: NEGATIVE
FLUBV RNA RESP QL NAA+PROBE: NEGATIVE
GFR SERPL CREATININE-BSD FRML MDRD: 87 ML/MIN/1.73M2
GLUCOSE BLD-MCNC: 91 MG/DL (ref 70–125)
HCT VFR BLD AUTO: 42.5 % (ref 35–47)
HGB BLD-MCNC: 14 G/DL (ref 11.7–15.7)
HOLD SPECIMEN: NORMAL
IMM GRANULOCYTES # BLD: 0 10E3/UL
IMM GRANULOCYTES NFR BLD: 0 %
INTERPRETATION ECG - MUSE: NORMAL
LACTATE SERPL-SCNC: 0.9 MMOL/L (ref 0.7–2)
LACTATE SERPL-SCNC: 2.6 MMOL/L (ref 0.7–2)
LYMPHOCYTES # BLD AUTO: 0.2 10E3/UL (ref 0.8–5.3)
LYMPHOCYTES NFR BLD AUTO: 3 %
MCH RBC QN AUTO: 29.9 PG (ref 26.5–33)
MCHC RBC AUTO-ENTMCNC: 32.9 G/DL (ref 31.5–36.5)
MCV RBC AUTO: 91 FL (ref 78–100)
MONOCYTES # BLD AUTO: 0.4 10E3/UL (ref 0–1.3)
MONOCYTES NFR BLD AUTO: 6 %
NEUTROPHILS # BLD AUTO: 6.7 10E3/UL (ref 1.6–8.3)
NEUTROPHILS NFR BLD AUTO: 88 %
NRBC # BLD AUTO: 0 10E3/UL
NRBC BLD AUTO-RTO: 0 /100
P AXIS - MUSE: 33 DEGREES
PLATELET # BLD AUTO: 185 10E3/UL (ref 150–450)
POTASSIUM BLD-SCNC: 4.2 MMOL/L (ref 3.5–5)
PR INTERVAL - MUSE: 132 MS
PROT SERPL-MCNC: 6.6 G/DL (ref 6–8)
QRS DURATION - MUSE: 88 MS
QT - MUSE: 456 MS
QTC - MUSE: 474 MS
R AXIS - MUSE: 16 DEGREES
RBC # BLD AUTO: 4.68 10E6/UL (ref 3.8–5.2)
SARS-COV-2 RNA RESP QL NAA+PROBE: POSITIVE
SODIUM SERPL-SCNC: 138 MMOL/L (ref 136–145)
SYSTOLIC BLOOD PRESSURE - MUSE: NORMAL MMHG
T AXIS - MUSE: 46 DEGREES
VENTRICULAR RATE- MUSE: 65 BPM
WBC # BLD AUTO: 7.5 10E3/UL (ref 4–11)

## 2022-05-24 PROCEDURE — 85025 COMPLETE CBC W/AUTO DIFF WBC: CPT | Performed by: STUDENT IN AN ORGANIZED HEALTH CARE EDUCATION/TRAINING PROGRAM

## 2022-05-24 PROCEDURE — 999N000127 HC STATISTIC PERIPHERAL IV START W US GUIDANCE

## 2022-05-24 PROCEDURE — 87040 BLOOD CULTURE FOR BACTERIA: CPT | Performed by: STUDENT IN AN ORGANIZED HEALTH CARE EDUCATION/TRAINING PROGRAM

## 2022-05-24 PROCEDURE — 258N000003 HC RX IP 258 OP 636: Performed by: STUDENT IN AN ORGANIZED HEALTH CARE EDUCATION/TRAINING PROGRAM

## 2022-05-24 PROCEDURE — 96361 HYDRATE IV INFUSION ADD-ON: CPT

## 2022-05-24 PROCEDURE — 87636 SARSCOV2 & INF A&B AMP PRB: CPT | Performed by: STUDENT IN AN ORGANIZED HEALTH CARE EDUCATION/TRAINING PROGRAM

## 2022-05-24 PROCEDURE — 93005 ELECTROCARDIOGRAM TRACING: CPT | Performed by: STUDENT IN AN ORGANIZED HEALTH CARE EDUCATION/TRAINING PROGRAM

## 2022-05-24 PROCEDURE — 71046 X-RAY EXAM CHEST 2 VIEWS: CPT

## 2022-05-24 PROCEDURE — 80053 COMPREHEN METABOLIC PANEL: CPT | Performed by: STUDENT IN AN ORGANIZED HEALTH CARE EDUCATION/TRAINING PROGRAM

## 2022-05-24 PROCEDURE — 96368 THER/DIAG CONCURRENT INF: CPT

## 2022-05-24 PROCEDURE — C9803 HOPD COVID-19 SPEC COLLECT: HCPCS

## 2022-05-24 PROCEDURE — 99285 EMERGENCY DEPT VISIT HI MDM: CPT | Mod: CS,25

## 2022-05-24 PROCEDURE — 83605 ASSAY OF LACTIC ACID: CPT | Performed by: STUDENT IN AN ORGANIZED HEALTH CARE EDUCATION/TRAINING PROGRAM

## 2022-05-24 PROCEDURE — 96366 THER/PROPH/DIAG IV INF ADDON: CPT

## 2022-05-24 PROCEDURE — 96365 THER/PROPH/DIAG IV INF INIT: CPT

## 2022-05-24 PROCEDURE — 250N000011 HC RX IP 250 OP 636: Performed by: STUDENT IN AN ORGANIZED HEALTH CARE EDUCATION/TRAINING PROGRAM

## 2022-05-24 PROCEDURE — 36415 COLL VENOUS BLD VENIPUNCTURE: CPT | Performed by: STUDENT IN AN ORGANIZED HEALTH CARE EDUCATION/TRAINING PROGRAM

## 2022-05-24 PROCEDURE — 250N000013 HC RX MED GY IP 250 OP 250 PS 637: Performed by: STUDENT IN AN ORGANIZED HEALTH CARE EDUCATION/TRAINING PROGRAM

## 2022-05-24 RX ORDER — SODIUM CHLORIDE 9 MG/ML
INJECTION, SOLUTION INTRAVENOUS CONTINUOUS
Status: DISCONTINUED | OUTPATIENT
Start: 2022-05-24 | End: 2022-05-24 | Stop reason: HOSPADM

## 2022-05-24 RX ORDER — PIPERACILLIN SODIUM, TAZOBACTAM SODIUM 3; .375 G/15ML; G/15ML
3.38 INJECTION, POWDER, LYOPHILIZED, FOR SOLUTION INTRAVENOUS ONCE
Status: COMPLETED | OUTPATIENT
Start: 2022-05-24 | End: 2022-05-24

## 2022-05-24 RX ORDER — CEFAZOLIN SODIUM 1 G/50ML
2500 SOLUTION INTRAVENOUS ONCE
Status: COMPLETED | OUTPATIENT
Start: 2022-05-24 | End: 2022-05-24

## 2022-05-24 RX ORDER — DOXYCYCLINE 100 MG/1
100 CAPSULE ORAL 2 TIMES DAILY
Qty: 14 CAPSULE | Refills: 0 | Status: SHIPPED | OUTPATIENT
Start: 2022-05-24 | End: 2022-05-31

## 2022-05-24 RX ORDER — ACETAMINOPHEN 325 MG/1
650 TABLET ORAL ONCE
Status: COMPLETED | OUTPATIENT
Start: 2022-05-24 | End: 2022-05-24

## 2022-05-24 RX ADMIN — SODIUM CHLORIDE 1000 ML: 9 INJECTION, SOLUTION INTRAVENOUS at 11:07

## 2022-05-24 RX ADMIN — PIPERACILLIN AND TAZOBACTAM 3.38 G: 3; .375 INJECTION, POWDER, LYOPHILIZED, FOR SOLUTION INTRAVENOUS at 12:00

## 2022-05-24 RX ADMIN — ACETAMINOPHEN 650 MG: 325 TABLET, FILM COATED ORAL at 11:12

## 2022-05-24 RX ADMIN — VANCOMYCIN HYDROCHLORIDE 2500 MG: 5 INJECTION, POWDER, LYOPHILIZED, FOR SOLUTION INTRAVENOUS at 13:47

## 2022-05-24 RX ADMIN — SODIUM CHLORIDE: 9 INJECTION, SOLUTION INTRAVENOUS at 13:47

## 2022-05-24 RX ADMIN — SODIUM CHLORIDE 1000 ML: 9 INJECTION, SOLUTION INTRAVENOUS at 12:29

## 2022-05-24 NOTE — ED PROVIDER NOTES
EMERGENCY DEPARTMENT ENCOUNTER      NAME: Riana Cui  AGE: 61 year old female  YOB: 1960  MRN: [id number]  EVALUATION DATE & TIME: No admission date for patient encounter.    PCP: Roxy Hagen    ED PROVIDER: Good Jeffrey M.D.      Chief Complaint   Patient presents with     Dizziness     Fever         FINAL IMPRESSION:  COVID    ED COURSE & MEDICAL DECISION MAKING:    Pertinent Labs & Imaging studies reviewed. (See chart for details)  61 year old female presents to the Emergency Department for evaluation of fever and feeling lightheaded.  I felt that the patient's symptoms were almost entirely explained by her COVID diagnosis.  There is a questionable infiltrate on the chest x-ray.  Patient initially had a elevated lactic acid and was given a dose of antibiotics and fluids based on that.  Once the COVID test came back positive this began the presumptive diagnosis.  Patient has received fluids and antibiotics and we will send repeat lactic acid and discharge the patient if it is normal and she is still feeling better.    Patient signed out to Dr. Curiel pending ultimate disposition.    11:41 AM I met with the patient, obtained history, performed an initial exam, and discussed options and plan for diagnostics and treatment here in the ED. PPE worn including N95 mask, surgical gloves.    1:02 PM the patient had general symptoms consistent with fever and COVID-19.  Vital signs were stable.  Because of the fever lactic acid was sent which is 2.6.  Patient was in waiting room at the time of lactic acid and antibiotics were ordered reflexively to cover for sepsis of unknown origin.  Patient does not take any medications that would elevate her lactic acid.  I am less concerned however for severe bacterial infection and believe that the symptoms most likely are due to the COVID-19.  We are awaiting a x-ray of her lungs and she has been given fluids and received antibiotics.  We will plan to recheck  the lactic acid after the fluids and determine disposition.  The patient is generally very well-appearing and nontoxic.      At the conclusion of the encounter I discussed the results of all of the tests and the disposition. The questions were answered. The patient or family acknowledged understanding and was agreeable with the care plan.        minutes of critical care time     MEDICATIONS GIVEN IN THE EMERGENCY:  Medications   0.9% sodium chloride BOLUS (0 mLs Intravenous Stopped 5/24/22 1234)     Followed by   0.9% sodium chloride BOLUS (0 mLs Intravenous Stopped 5/24/22 1400)     Followed by   sodium chloride 0.9% infusion ( Intravenous New Bag 5/24/22 1347)   vancomycin (VANCOCIN) 2,500 mg in sodium chloride 0.9 % 500 mL intermittent infusion (2,500 mg Intravenous New Bag 5/24/22 1347)   piperacillin-tazobactam (ZOSYN) 3.375 g vial to attach to  mL bag (0 g Intravenous Stopped 5/24/22 1401)   acetaminophen (TYLENOL) tablet 650 mg (650 mg Oral Given 5/24/22 1112)       NEW PRESCRIPTIONS STARTED AT TODAY'S ER VISIT  New Prescriptions    No medications on file          =================================================================    HPI    Patient information was obtained from: Patient    Use of : N/A        Riana Cui is a 61 year old female with a pertinent history of hypothyroidism, hyperlipidemia, ANDRIY, anxiety who presents to this ED by EMS for evaluation of feeling feverish at home.  She also was very tremulous earlier this morning.  Patient denies any shortness of breath or cough.  She had an episode of fever that started with the episode.  Patient is now feeling fine as her temperature is going down.  Denies any chest pain.  No significant headache but she does have some body aches.  Patient is vaccinated with full series of the COVID-19 vaccination.      REVIEW OF SYSTEMS   Review of Systems   All other systems reviewed and are negative.       PAST MEDICAL HISTORY:  Past Medical  "History:   Diagnosis Date     Anaphylactic reaction 06/10/2015    to ABX, toungue swelling     Anxiety      Bladder disorder      Colitis 2014     Obesity      PONV (postoperative nausea and vomiting)        PAST SURGICAL HISTORY:  Past Surgical History:   Procedure Laterality Date     ABDOMEN SURGERY        SECTION       CHOLECYSTECTOMY       COLONOSCOPY W/ BIOPSIES N/A 6/3/2021    Procedure: Colonoscopy with polypectomies;  Surgeon: Cruzito Field MD;  Location: [facility];  Service: Gastroenterology     GASTRIC BYPASS       HC KNEE SCOPE,SINGLE MENISECTOMY Left 2015    Procedure: LEFT KNEE ARTHROSCOPY , PARTIAL MEDIAL AND LATERAL MENISECTOMY;  Surgeon: Gato Quiñones MD;  Location: [facility];  Service: Orthopedics     HERNIA REPAIR, UMBILICAL      X 3           CURRENT MEDICATIONS:    lisinopril (ZESTRIL) 10 MG tablet  oxybutynin ER (DITROPAN XL) 15 MG 24 hr tablet  PAROXETINE HCL PO        ALLERGIES:  Allergies   Allergen Reactions     Ibuprofen Anaphylaxis     Shellfish-Derived Products Anaphylaxis     Rofecoxib      Other reaction(s): Stomach pain, rash       FAMILY HISTORY:  Family History   Problem Relation Age of Onset     Heart Disease Mother      Coronary Artery Disease Father      Cancer Brother      Coronary Artery Disease Brother      Cancer Brother        SOCIAL HISTORY:   Social History     Socioeconomic History     Marital status:    Tobacco Use     Smoking status: Current Every Day Smoker     Packs/day: 0.50     Years: 34.00     Pack years: 17.00     Types: Cigarettes, Cigarettes     Smokeless tobacco: Never Used     Tobacco comment: pt declined info   Substance and Sexual Activity     Alcohol use: No     Drug use: No       VITALS:  /57   Pulse 70   Temp (!) 100.9  F (38.3  C) (Oral)   Resp (!) 35   Ht 1.6 m (5' 3\")   Wt 119.3 kg (263 lb)   LMP  (LMP Unknown)   SpO2 95%   BMI 46.59 kg/m        PHYSICAL EXAM    Constitutional: Well " developed, Well nourished, NAD, GCS 15  HENT: Normocephalic, Atraumatic, Bilateral external ears normal, Oropharynx normal, mucous membranes moist, Nose normal. Neck-  Normal range of motion, No tenderness, Supple, No stridor.  Eyes: PERRL, EOMI, Conjunctiva normal, No discharge.   Respiratory: Normal breath sounds, No respiratory distress, No wheezing, Speaks full sentences easily. No cough.  Cardiovascular: Normal heart rate, Regular rhythm, No murmurs, No rubs, No gallops. Chest wall nontender.  GI:Soft, No tenderness, No masses, No flank tenderness. No rebound or guarding.   Musculoskeletal: 2+ DP pulses. No edema.No cyanosis, No clubbing. Good range of motion in all major joints. No tenderness to palpation or major deformities noted.   Integument: Warm, Dry, No erythema, No rash. No petechiae.   Neurologic: Alert & oriented x 3,  CN 3-12 intact Normal motor function, Normal sensory function, No focal deficits noted. Normal gait. Normal finger to nose bilaterally  Psychiatric: Affect normal, Judgment normal, Mood normal. Cooperative.          LAB:  All pertinent labs reviewed and interpreted.  Labs Ordered and Resulted from Time of ED Arrival to Time of ED Departure   COMPREHENSIVE METABOLIC PANEL - Abnormal       Result Value    Sodium 138      Potassium 4.2      Chloride 106      Carbon Dioxide (CO2) 24      Anion Gap 8      Urea Nitrogen 14      Creatinine 0.78      Calcium 9.0      Glucose 91      Alkaline Phosphatase 82      AST 18      ALT 13      Protein Total 6.6      Albumin 3.2 (*)     Bilirubin Total 0.4      GFR Estimate 87     INFLUENZA A/B & SARS-COV2 PCR MULTIPLEX - Abnormal    Influenza A PCR Negative      Influenza B PCR Negative      SARS CoV2 PCR Positive (*)    LACTIC ACID WHOLE BLOOD - Abnormal    Lactic Acid 2.6 (*)    CBC WITH PLATELETS AND DIFFERENTIAL - Abnormal    WBC Count 7.5      RBC Count 4.68      Hemoglobin 14.0      Hematocrit 42.5      MCV 91      MCH 29.9      MCHC 32.9       RDW 13.2      Platelet Count 185      % Neutrophils 88      % Lymphocytes 3      % Monocytes 6      % Eosinophils 2      % Basophils 1      % Immature Granulocytes 0      NRBCs per 100 WBC 0      Absolute Neutrophils 6.7      Absolute Lymphocytes 0.2 (*)     Absolute Monocytes 0.4      Absolute Eosinophils 0.1      Absolute Basophils 0.1      Absolute Immature Granulocytes 0.0      Absolute NRBCs 0.0     ROUTINE UA WITH MICROSCOPIC REFLEX TO CULTURE   LACTIC ACID WHOLE BLOOD   BLOOD CULTURE   BLOOD CULTURE       RADIOLOGY:  Reviewed all pertinent imaging. Please see official radiology report.  Chest XR,  PA & LAT   Final Result   IMPRESSION: Questionable minimal right base opacities which could be infectious / inflammatory given history. No pleural effusion or pneumothorax. Normal heart size.                EKG:    Performed at: 12:13    Impression: Sinus rhythm with marked sinus arrhythmia    Rate: 65  Rhythm: Sinus rhythm  Axis: 33 16 46  NH Interval: 132  QRS Interval: 88  QTc Interval: 474  ST Changes: None  Comparison: When compared with EKG of 12-DEC-2017, premature atrial complexes are no longer present    I have independently reviewed and interpreted the EKG(s) documented above.    PROCEDURES:           Good Jeffrey M.D.  Emergency Medicine  [facility] EMERGENCY DEPARTMENT  [address] [id number] [phone]  Dept: [phone]     Good Jeffrey MD  05/24/22 0606

## 2022-05-24 NOTE — PHARMACY-VANCOMYCIN DOSING SERVICE
Pharmacy Vancomycin Initial Note  Date of Service May 24, 2022  Patient's  1960  61 year old, female    Indication: Sepsis    Current estimated CrCl = CrCl cannot be calculated (Patient's most recent lab result is older than the maximum 30 days allowed.).    Creatinine for last 3 days  No results found for requested labs within last 72 hours.    Recent Vancomycin Level(s) for last 3 days  No results found for requested labs within last 72 hours.      Vancomycin IV Administrations (past 72 hours)      No vancomycin orders with administrations in past 72 hours.                Nephrotoxins and other renal medications (From now, onward)    Start     Dose/Rate Route Frequency Ordered Stop    22 1200  vancomycin (VANCOCIN) 2,500 mg in sodium chloride 0.9 % 500 mL intermittent infusion         2,500 mg  over 2 Hours Intravenous ONCE 22 1113      22 1130  piperacillin-tazobactam (ZOSYN) 3.375 g vial to attach to  mL bag         3.375 g  over 30 Minutes Intravenous ONCE 22 1052            Contrast Orders - past 72 hours (72h ago, onward)    None                Plan:  1. Start vancomycin 2500mg IV once in ED  2. Please re-consult pharmacy if therapy is to continue.      Chante Smiley, [facility]

## 2022-05-24 NOTE — ED TRIAGE NOTES
Pt verbalized she fell x 1 today and had 2 almost falling episodes. Pt did negative covid test today at home

## 2022-05-24 NOTE — DISCHARGE INSTRUCTIONS
You were seen in the Emergency Department today for evaluation of fever.  Your lab work showed positive COVID. Your imaging studies showed a possible pneumonia. You were prescribed doxycycline for this possible pneumonia. You should take all medications as prescribed.  Follow up with your primary care physician to ensure resolution of symptoms. Return if you have new or worsening symptoms.

## 2022-05-24 NOTE — ED NOTES
EMERGENCY DEPARTMENT SIGN OUT NOTE        ED COURSE AND MEDICAL DECISION MAKING  Patient was signed out to me by Dr Good Jeffrey at 2:30 pm    In brief, Riana Cui is a 61 year old female who initially presented with a fever.  She was found positive for COVID.  She was also found to have a questionable pneumonia.  She was initially given vancomycin and Zosyn because of the elevated lactate but she did well here and improved significantly.     At time of sign out, the plan was for repeat lactate and likely discharge home with antibiotics.     At 315 I went to talk to the patient and she was feeling better.  She felt comfortable with discharge home.  Her lactate had improved to 0.9 so she was sent with a prescription for doxycycline.  She was in agreement with the plan.    FINAL IMPRESSION    1. Pneumonia of right lower lobe due to infectious organism    2. Pneumonia due to 2019 novel coronavirus        ED MEDS  Medications   0.9% sodium chloride BOLUS (0 mLs Intravenous Stopped 5/24/22 1234)     Followed by   0.9% sodium chloride BOLUS (0 mLs Intravenous Stopped 5/24/22 1400)     Followed by   sodium chloride 0.9% infusion ( Intravenous New Bag 5/24/22 1347)   vancomycin (VANCOCIN) 2,500 mg in sodium chloride 0.9 % 500 mL intermittent infusion (2,500 mg Intravenous New Bag 5/24/22 1347)   piperacillin-tazobactam (ZOSYN) 3.375 g vial to attach to  mL bag (0 g Intravenous Stopped 5/24/22 1401)   acetaminophen (TYLENOL) tablet 650 mg (650 mg Oral Given 5/24/22 1112)       LAB  Labs Ordered and Resulted from Time of ED Arrival to Time of ED Departure   COMPREHENSIVE METABOLIC PANEL - Abnormal       Result Value    Sodium 138      Potassium 4.2      Chloride 106      Carbon Dioxide (CO2) 24      Anion Gap 8      Urea Nitrogen 14      Creatinine 0.78      Calcium 9.0      Glucose 91      Alkaline Phosphatase 82      AST 18      ALT 13      Protein Total 6.6      Albumin 3.2 (*)     Bilirubin Total 0.4      GFR  Estimate 87     INFLUENZA A/B & SARS-COV2 PCR MULTIPLEX - Abnormal    Influenza A PCR Negative      Influenza B PCR Negative      SARS CoV2 PCR Positive (*)    LACTIC ACID WHOLE BLOOD - Abnormal    Lactic Acid 2.6 (*)    CBC WITH PLATELETS AND DIFFERENTIAL - Abnormal    WBC Count 7.5      RBC Count 4.68      Hemoglobin 14.0      Hematocrit 42.5      MCV 91      MCH 29.9      MCHC 32.9      RDW 13.2      Platelet Count 185      % Neutrophils 88      % Lymphocytes 3      % Monocytes 6      % Eosinophils 2      % Basophils 1      % Immature Granulocytes 0      NRBCs per 100 WBC 0      Absolute Neutrophils 6.7      Absolute Lymphocytes 0.2 (*)     Absolute Monocytes 0.4      Absolute Eosinophils 0.1      Absolute Basophils 0.1      Absolute Immature Granulocytes 0.0      Absolute NRBCs 0.0     LACTIC ACID WHOLE BLOOD - Normal    Lactic Acid 0.9     ROUTINE UA WITH MICROSCOPIC REFLEX TO CULTURE   BLOOD CULTURE   BLOOD CULTURE     RADIOLOGY    Chest XR,  PA & LAT   Final Result   IMPRESSION: Questionable minimal right base opacities which could be infectious / inflammatory given history. No pleural effusion or pneumothorax. Normal heart size.              DISCHARGE MEDS  New Prescriptions    DOXYCYCLINE HYCLATE (VIBRAMYCIN) 100 MG CAPSULE    Take 1 capsule (100 mg) by mouth 2 times daily for 7 days       Lina Curiel MD  [facility] EMERGENCY DEPARTMENT  [facility] [facility] [id number] [phone]     Lina Curiel MD  05/24/22 [id number]

## 2022-05-24 NOTE — ED TRIAGE NOTES
At around 7 AM while doing her usual activities.  Started feeling dizziness.  Unsteady nmot spinning.  Hx of vertigo.   Pt has been having tremors.   Also noted fever at home 101.8 no meds taken.  Pt has back pain but relates it to the shaking. Zofran 4 mg po given by [facility] Medicyelena osborn

## 2022-05-29 LAB
BACTERIA BLD CULT: NO GROWTH
BACTERIA BLD CULT: NO GROWTH

## 2022-05-31 ENCOUNTER — OFFICE VISIT (OUTPATIENT)
Dept: FAMILY MEDICINE | Facility: CLINIC | Age: 62
End: 2022-05-31
Payer: COMMERCIAL

## 2022-05-31 VITALS
HEIGHT: 63 IN | BODY MASS INDEX: 46.74 KG/M2 | SYSTOLIC BLOOD PRESSURE: 101 MMHG | RESPIRATION RATE: 20 BRPM | WEIGHT: 263.8 LBS | DIASTOLIC BLOOD PRESSURE: 71 MMHG | OXYGEN SATURATION: 97 % | HEART RATE: 70 BPM

## 2022-05-31 DIAGNOSIS — J12.82 PNEUMONIA DUE TO 2019 NOVEL CORONAVIRUS: Primary | ICD-10-CM

## 2022-05-31 DIAGNOSIS — U07.1 PNEUMONIA DUE TO 2019 NOVEL CORONAVIRUS: Primary | ICD-10-CM

## 2022-05-31 PROBLEM — M25.819 OTHER AFFECTIONS OF SHOULDER REGION, NOT ELSEWHERE CLASSIFIED: Status: ACTIVE | Noted: 2022-05-31

## 2022-05-31 PROCEDURE — 99212 OFFICE O/P EST SF 10 MIN: CPT | Performed by: FAMILY MEDICINE

## 2022-05-31 NOTE — PROGRESS NOTES
"  Assessment & Plan     ICD-10-CM    1. Pneumonia due to 2019 novel coronavirus  U07.1     J12.82      Medical decision making: Patient is here today for follow-up of her pneumonia due to COVID-19.  She was seen in the emergency room.  Her initial lactic acid was noted to be high.  An x-ray showed possible pneumonia.  She was treated with IV antibiotic initially and then sent home on doxycycline that she has now finished.  She was not treated for COVID-19 with either remdesivir or with any other medication.  The reason for this is not clear.  However, at this time she seems to be improving.  Her oxygen saturation is getting normal and her symptoms have vastly improved.  She is out of timeline to initiate treatment at this time.  Warning signs and symptoms discussed with her to continue to monitor and let us know.  Patient verbalizes understanding of the plan.       Tobacco Cessation:   reports that she has been smoking cigarettes and cigarettes. She has a 17.00 pack-year smoking history. She has never used smokeless tobacco.      BMI:   Estimated body mass index is 46.73 kg/m  as calculated from the following:    Height as of this encounter: 1.6 m (5' 3\").    Weight as of this encounter: 119.7 kg (263 lb 12.8 oz).       MEDICATIONS:  Continue current medications without change  See Patient Instructions    Return in about 2 months (around 7/31/2022) for Routine preventive.    Nupur Coleman MD  [facility]    Subjective    Chief Complaint   Patient presents with     Hospital F/U     Covid/ Pneumonia at [facility] 05/24/22.      Riana is a 61 year old who presents for the following health issues     HPI       Hospital Follow-up Visit:    Hospital/Nursing Home/IP Rehab Facility: [facility]  Date of Admission: 05/24/22  Date of Discharge: 05/24/22  Reason(s) for Admission: Covid/ Pneumonia      Was your hospitalization related to COVID-19? YES   How are you " "feeling today? Better  In the past 24 hours have you had shortness of breath when speaking, walking, or climbing stairs? My breathing issues have improved  Do you have a cough? Yes, I have a cough but it's not worse  When is the last time you had a fever greater than 100? 05/25/22  Are you having any other symptoms? Loss of sense of taste or smell, Fatigue and Headaches   Do you have any other stressors you would like to discuss with your provider? No        Was the patient in the ICU or did the patient experience delirium during hospitalization?  No          Problems taking medications regularly:  None  Medication changes since discharge: None  Problems adhering to non-medication therapy:  None    Summary of hospitalization:  [facility] discharge summary reviewed  Diagnostic Tests/Treatments reviewed.  Follow up needed: none  Other Healthcare Providers Involved in Patient s Care:         None  Update since discharge: improved.       Post Discharge Medication Reconciliation: discharge medications reconciled, continue medications without change.  Plan of care communicated with patient              Patient Active Problem List   Diagnosis     Anxiety     Post-gastric Bypass For Obesity     Morbid obesity (H)     Tobacco use     Angioedema     Varicose veins     Symptomatic varicose veins of both lower extremities     Depression     GERD (gastroesophageal reflux disease)     Glaucoma     Hypercholesteremia     ANDRIY (obstructive sleep apnea)     Personal history of colonic polyps     Other affections of shoulder region, not elsewhere classified         Review of Systems   Constitutional, HEENT, cardiovascular, pulmonary, gi and gu systems are negative, except as otherwise noted.      Objective    /71 (BP Location: Right arm, Patient Position: Sitting, Cuff Size: Adult Large)   Pulse 70   Resp 20   Ht 1.6 m (5' 3\")   Wt 119.7 kg (263 lb 12.8 oz)   LMP  (LMP Unknown)   SpO2 97%   BMI 46.73 kg/m  "   Body mass index is 46.73 kg/m .  Physical Exam   GENERAL: healthy, alert and no distress  NECK: no adenopathy, no asymmetry, masses, or scars and thyroid normal to palpation  RESP: lungs clear to auscultation - no rales, rhonchi or wheezes  CV: regular rate and rhythm, normal S1 S2, no S3 or S4, no murmur, click or rub, no peripheral edema and peripheral pulses strong  ABDOMEN: soft, nontender, no hepatosplenomegaly, no masses and bowel sounds normal  MS: no gross musculoskeletal defects noted, no edema    Admission on 05/24/2022, Discharged on 05/24/2022   Component Date Value Ref Range Status     Hold Specimen 05/24/2022 [facility]   Final     Hold Specimen 05/24/2022 [facility]   Final     Hold Specimen 05/24/2022 [facility]   Final     Sodium 05/24/2022 138  136 - 145 mmol/L Final     Potassium 05/24/2022 4.2  3.5 - 5.0 mmol/L Final     Chloride 05/24/2022 106  98 - 107 mmol/L Final     Carbon Dioxide (CO2) 05/24/2022 24  22 - 31 mmol/L Final     Anion Gap 05/24/2022 8  5 - 18 mmol/L Final     Urea Nitrogen 05/24/2022 14  8 - 22 mg/dL Final     Creatinine 05/24/2022 0.78  0.60 - 1.10 mg/dL Final     Calcium 05/24/2022 9.0  8.5 - 10.5 mg/dL Final     Glucose 05/24/2022 91  70 - 125 mg/dL Final     Alkaline Phosphatase 05/24/2022 82  45 - 120 U/L Final     AST 05/24/2022 18  0 - 40 U/L Final     ALT 05/24/2022 13  0 - 45 U/L Final     Protein Total 05/24/2022 6.6  6.0 - 8.0 g/dL Final     Albumin 05/24/2022 3.2 (A) 3.5 - 5.0 g/dL Final     Bilirubin Total 05/24/2022 0.4  0.0 - 1.0 mg/dL Final     GFR Estimate 05/24/2022 87  >60 mL/min/1.73m2 Final    Effective December 21, 2021 eGFRcr in adults is calculated using the 2021 CKD-EPI creatinine equation which includes age and gender (John et al., NEJM, DOI: 10.1056/GFIAxw9986831)     Influenza A PCR 05/24/2022 Negative  Negative Final     Influenza B PCR 05/24/2022 Negative  Negative Final     SARS CoV2 PCR 05/24/2022 Positive (A) Negative Final    POSITIVE: SARS-CoV-2 (COVID-19) RNA  detected, presumed positive.     Lactic Acid 05/24/2022 2.6 (A) 0.7 - 2.0 mmol/L Final     WBC Count 05/24/2022 7.5  4.0 - 11.0 10e3/uL Final     RBC Count 05/24/2022 4.68  3.80 - 5.20 10e6/uL Final     Hemoglobin 05/24/2022 14.0  11.7 - 15.7 g/dL Final     Hematocrit 05/24/2022 42.5  35.0 - 47.0 % Final     MCV 05/24/2022 91  78 - 100 fL Final     MCH 05/24/2022 29.9  26.5 - 33.0 pg Final     MCHC 05/24/2022 32.9  31.5 - 36.5 g/dL Final     RDW 05/24/2022 13.2  10.0 - 15.0 % Final     Platelet Count 05/24/2022 185  150 - 450 10e3/uL Final     % Neutrophils 05/24/2022 88  % Final     % Lymphocytes 05/24/2022 3  % Final     % Monocytes 05/24/2022 6  % Final     % Eosinophils 05/24/2022 2  % Final     % Basophils 05/24/2022 1  % Final     % Immature Granulocytes 05/24/2022 0  % Final     NRBCs per 100 WBC 05/24/2022 0  <1 /100 Final     Absolute Neutrophils 05/24/2022 6.7  1.6 - 8.3 10e3/uL Final     Absolute Lymphocytes 05/24/2022 0.2 (A) 0.8 - 5.3 10e3/uL Final     Absolute Monocytes 05/24/2022 0.4  0.0 - 1.3 10e3/uL Final     Absolute Eosinophils 05/24/2022 0.1  0.0 - 0.7 10e3/uL Final     Absolute Basophils 05/24/2022 0.1  0.0 - 0.2 10e3/uL Final     Absolute Immature Granulocytes 05/24/2022 0.0  <=0.4 10e3/uL Final     Absolute NRBCs 05/24/2022 0.0  10e3/uL Final     Hold Specimen 05/24/2022 [facility]   Final     Hold Specimen 05/24/2022 [facility]   Final     Culture 05/24/2022 No Growth   Final     Culture 05/24/2022 No Growth   Final     Ventricular Rate 05/24/2022 65  BPM Final     Atrial Rate 05/24/2022 65  BPM Final     ND Interval 05/24/2022 132  ms Final     QRS Duration 05/24/2022 88  ms Final     QT 05/24/2022 456  ms Final     QTc 05/24/2022 474  ms Final     P Axis 05/24/2022 33  degrees Final     R AXIS 05/24/2022 16  degrees Final     T Axis 05/24/2022 46  degrees Final     Interpretation ECG 05/24/2022    Final                    Value:Sinus rhythm with marked sinus arrhythmia  Otherwise normal ECG  When  compared with ECG of 12-DEC-2017 09:16,  Premature atrial complexes are no longer Present  Confirmed by SEE ED PROVIDER NOTE FOR, ECG INTERPRETATION ([id number]),  CRUZITO HERCULES ([id number]) on 5/24/2022 2:13:08 PM       Lactic Acid 05/24/2022 0.9  0.7 - 2.0 mmol/L Final

## 2022-07-07 ENCOUNTER — TRANSFERRED RECORDS (OUTPATIENT)
Dept: HEALTH INFORMATION MANAGEMENT | Facility: CLINIC | Age: 62
End: 2022-07-07

## 2022-07-31 ENCOUNTER — HOSPITAL ENCOUNTER (EMERGENCY)
Facility: CLINIC | Age: 62
Discharge: HOME | End: 2022-07-31
Attending: FAMILY MEDICINE | Admitting: FAMILY MEDICINE
Payer: COMMERCIAL

## 2022-07-31 VITALS
SYSTOLIC BLOOD PRESSURE: 115 MMHG | BODY MASS INDEX: 46.42 KG/M2 | DIASTOLIC BLOOD PRESSURE: 81 MMHG | OXYGEN SATURATION: 94 % | RESPIRATION RATE: 18 BRPM | TEMPERATURE: 100.5 F | HEART RATE: 70 BPM | HEIGHT: 63 IN | WEIGHT: 262 LBS

## 2022-07-31 DIAGNOSIS — L03.116 LEFT LEG CELLULITIS: ICD-10-CM

## 2022-07-31 LAB
ANION GAP SERPL CALCULATED.3IONS-SCNC: 7 MMOL/L (ref 3–14)
BASOPHILS # BLD AUTO: 0 10E3/UL (ref 0–0.2)
BASOPHILS NFR BLD AUTO: 0 %
BUN SERPL-MCNC: 14 MG/DL (ref 7–30)
CALCIUM SERPL-MCNC: 8.2 MG/DL (ref 8.5–10.1)
CHLORIDE BLD-SCNC: 108 MMOL/L (ref 94–109)
CO2 SERPL-SCNC: 25 MMOL/L (ref 20–32)
CREAT SERPL-MCNC: 0.8 MG/DL (ref 0.52–1.04)
EOSINOPHIL # BLD AUTO: 0.1 10E3/UL (ref 0–0.7)
EOSINOPHIL NFR BLD AUTO: 1 %
ERYTHROCYTE [DISTWIDTH] IN BLOOD BY AUTOMATED COUNT: 13.9 % (ref 10–15)
GFR SERPL CREATININE-BSD FRML MDRD: 84 ML/MIN/1.73M2
GLUCOSE BLD-MCNC: 107 MG/DL (ref 70–99)
HCT VFR BLD AUTO: 41.2 % (ref 35–47)
HGB BLD-MCNC: 13.2 G/DL (ref 11.7–15.7)
IMM GRANULOCYTES # BLD: 0.1 10E3/UL
IMM GRANULOCYTES NFR BLD: 1 %
LYMPHOCYTES # BLD AUTO: 0.8 10E3/UL (ref 0.8–5.3)
LYMPHOCYTES NFR BLD AUTO: 7 %
MCH RBC QN AUTO: 29.4 PG (ref 26.5–33)
MCHC RBC AUTO-ENTMCNC: 32 G/DL (ref 31.5–36.5)
MCV RBC AUTO: 92 FL (ref 78–100)
MONOCYTES # BLD AUTO: 0.6 10E3/UL (ref 0–1.3)
MONOCYTES NFR BLD AUTO: 5 %
NEUTROPHILS # BLD AUTO: 9.5 10E3/UL (ref 1.6–8.3)
NEUTROPHILS NFR BLD AUTO: 86 %
NRBC # BLD AUTO: 0 10E3/UL
NRBC BLD AUTO-RTO: 0 /100
PLATELET # BLD AUTO: 174 10E3/UL (ref 150–450)
POTASSIUM BLD-SCNC: 3.6 MMOL/L (ref 3.4–5.3)
RBC # BLD AUTO: 4.49 10E6/UL (ref 3.8–5.2)
SODIUM SERPL-SCNC: 140 MMOL/L (ref 133–144)
WBC # BLD AUTO: 11 10E3/UL (ref 4–11)

## 2022-07-31 PROCEDURE — 85004 AUTOMATED DIFF WBC COUNT: CPT | Performed by: FAMILY MEDICINE

## 2022-07-31 PROCEDURE — 99284 EMERGENCY DEPT VISIT MOD MDM: CPT | Mod: 25 | Performed by: FAMILY MEDICINE

## 2022-07-31 PROCEDURE — 96365 THER/PROPH/DIAG IV INF INIT: CPT | Performed by: FAMILY MEDICINE

## 2022-07-31 PROCEDURE — 250N000013 HC RX MED GY IP 250 OP 250 PS 637: Performed by: FAMILY MEDICINE

## 2022-07-31 PROCEDURE — 258N000003 HC RX IP 258 OP 636: Performed by: FAMILY MEDICINE

## 2022-07-31 PROCEDURE — 99284 EMERGENCY DEPT VISIT MOD MDM: CPT | Performed by: FAMILY MEDICINE

## 2022-07-31 PROCEDURE — 36415 COLL VENOUS BLD VENIPUNCTURE: CPT | Performed by: FAMILY MEDICINE

## 2022-07-31 PROCEDURE — 82374 ASSAY BLOOD CARBON DIOXIDE: CPT | Performed by: FAMILY MEDICINE

## 2022-07-31 PROCEDURE — 250N000011 HC RX IP 250 OP 636: Performed by: FAMILY MEDICINE

## 2022-07-31 RX ORDER — CEPHALEXIN 500 MG/1
500 CAPSULE ORAL 4 TIMES DAILY
Qty: 40 CAPSULE | Refills: 0 | Status: SHIPPED | OUTPATIENT
Start: 2022-07-31 | End: 2022-08-10

## 2022-07-31 RX ORDER — OXYCODONE HYDROCHLORIDE 5 MG/1
5-10 TABLET ORAL EVERY 6 HOURS PRN
Qty: 12 TABLET | Refills: 0 | Status: SHIPPED | OUTPATIENT
Start: 2022-07-31 | End: 2022-08-03

## 2022-07-31 RX ORDER — CEFAZOLIN SODIUM 2 G/100ML
2 INJECTION, SOLUTION INTRAVENOUS ONCE
Status: COMPLETED | OUTPATIENT
Start: 2022-07-31 | End: 2022-07-31

## 2022-07-31 RX ORDER — ACETAMINOPHEN 500 MG
1000 TABLET ORAL ONCE
Status: COMPLETED | OUTPATIENT
Start: 2022-07-31 | End: 2022-07-31

## 2022-07-31 RX ADMIN — CEFAZOLIN SODIUM 2 G: 10 INJECTION, POWDER, FOR SOLUTION INTRAVENOUS at 12:21

## 2022-07-31 RX ADMIN — ACETAMINOPHEN 1000 MG: 500 TABLET, FILM COATED ORAL at 12:24

## 2022-07-31 NOTE — DISCHARGE INSTRUCTIONS
Take cephalexin 500 mg 4 times daily.  Take a dose with dinner and a dose before bed today and then 4 times daily after that.  Rest and elevate your leg is much as possible for the next 2 days.  Return to be seen if you do not notice improvement in pain and redness within 2 days.  Some spreading of the redness tomorrow is acceptable and does not need to prompt return.  However if you are having fevers or the redness is spreading above your knee, return to the emergency department.    Take acetaminophen 1000 mg 4 times per day if needed for pain.    You may add oxycodone 5 mg, 1-2 tablets up to every 6 hours if needed for pain.  Try to use this primarily only at night to help with sleep.    Do not use alcohol, operate machinery, drive, or climb on ladders, or perform other complex motor activity or make important decisions for 8 hours after taking oxycodone. Use docusate (100mg) 2 times a day to prevent constipation while on narcotics.

## 2022-07-31 NOTE — ED PROVIDER NOTES
"  History     Chief Complaint   Patient presents with     Leg Pain     HPI  Riana Cui is a 61 year old female who comes in with concerns about a \"blood clot\" in her legs.  On her left shin she developed a red spot last evening while out at a campfire with her kids.  Throughout the night she had severe pain interfering with her sleep in the left leg.  This morning the redness had spread onto most of her lower leg above the ankle and below the knee and around to the back of the calf.  She has not had fever sweats or chills that she is aware of but had a fever on arrival in the ED.  She is not having any chest pain, rapid or irregular heartbeat, shortness of breath.  She has no history of DVT or PE.  She has a past history of a gastric bypass many years ago.  She has an alleged allergy to ibuprofen.  She developed an itchy rash at the time she took ibuprofen along with penicillin for sore throat.  The symptoms began a few hours after she took the medications.  She said she was tested by an allergist for a penicillin allergy and it was negative and so she has been advised never to take ibuprofen again.  She has not had an anaphylactic reaction to ibuprofen as documented in the allergy list.  She only had an itchy rash which she does not think was hives and did not have any other symptoms including no dyspnea, cough, wheezing, syncope, abdominal pain, nausea, vomiting.    Allergies:  Allergies   Allergen Reactions     Ibuprofen Anaphylaxis     Shellfish-Derived Products Anaphylaxis     Rofecoxib      Other reaction(s): Stomach pain, rash       Problem List:    Patient Active Problem List    Diagnosis Date Noted     Other affections of shoulder region, not elsewhere classified 05/31/2022     Priority: Medium     Formatting of this note might be different from the original.  Created by Conversion       Anxiety      Priority: Medium     Created by Conversion  Replacement Utility updated for latest IMO load         " Varicose veins 2015     Priority: Medium     Symptomatic varicose veins of both lower extremities 2015     Priority: Medium     Angioedema 06/10/2015     Priority: Medium     Post-gastric Bypass For Obesity      Priority: Medium     Created by Conversion         Morbid obesity (H) 2014     Priority: Medium     Tobacco use 2014     Priority: Medium     Depression 2009     Priority: Medium     GERD (gastroesophageal reflux disease) 2009     Priority: Medium     Formatting of this note might be different from the original.  hiatal hernia       Glaucoma 2009     Priority: Medium     Hypercholesteremia 2009     Priority: Medium     ANDRIY (obstructive sleep apnea) 2009     Priority: Medium     Personal history of colonic polyps 2009     Priority: Medium     Formatting of this note might be different from the original.  Colonoscopy [facility] 5-28-09. Adenomatous type.  Rescope in 5 years.          Past Medical History:    Past Medical History:   Diagnosis Date     Anaphylactic reaction 06/10/2015     Anxiety      Bladder disorder      Colitis 2014     Obesity      PONV (postoperative nausea and vomiting)        Past Surgical History:    Past Surgical History:   Procedure Laterality Date     ABDOMEN SURGERY        SECTION       CHOLECYSTECTOMY       COLONOSCOPY W/ BIOPSIES N/A 6/3/2021    Procedure: Colonoscopy with polypectomies;  Surgeon: Cruzito Field MD;  Location: [facility] Main OR;  Service: Gastroenterology     GASTRIC BYPASS       HC KNEE SCOPE,SINGLE MENISECTOMY Left 2015    Procedure: LEFT KNEE ARTHROSCOPY , PARTIAL MEDIAL AND LATERAL MENISECTOMY;  Surgeon: Gato Quiñones MD;  Location: [facility] OR;  Service: Orthopedics     HERNIA REPAIR, UMBILICAL      X 3       Family History:    Family History   Problem Relation Age of Onset     Heart Disease Mother      Coronary Artery Disease Father      Cancer Brother      Coronary  "Artery Disease Brother      Cancer Brother        Social History:  Marital Status:   [2]  Social History     Tobacco Use     Smoking status: Current Every Day Smoker     Packs/day: 0.50     Years: 34.00     Pack years: 17.00     Types: Cigarettes, Cigarettes     Smokeless tobacco: Never Used     Tobacco comment: pt declined info   Substance Use Topics     Alcohol use: No     Drug use: No        Medications:    cephALEXin (KEFLEX) 500 MG capsule  oxyCODONE (ROXICODONE) 5 MG tablet  lisinopril (ZESTRIL) 10 MG tablet  oxybutynin ER (DITROPAN XL) 15 MG 24 hr tablet  PAROXETINE HCL PO          Review of Systems  All other systems are reviewed and are negative    Physical Exam   BP: 109/67  Pulse: 70  Temp: (!) 100.5  F (38.1  C)  Resp: 18  Height: 160 cm (5' 3\")  Weight: 118.8 kg (262 lb)  SpO2: 97 %      Physical Exam     Nursing note and vitals were reviewed.  Constitutional: Awake and alert, overweight appearing 61-year-old in moderate discomfort, who does not appear acutely ill, and who answers questions appropriately and cooperates with examination.  HEENT: EOMI.   Neck: Freely mobile.  Cardiovascular: Cardiac examination reveals normal heart rate and regular rhythm without murmur.  Pulmonary/Chest: Breathing is unlabored.  Breath sounds are clear and equal bilaterally.  There no retractions, tachypnea, rales, wheezes, or rhonchi.  Abdomen: Soft, nontender, no HSM or masses rebound or guarding.  Musculoskeletal: Extremities are warm and well-perfused.  There is erythema, tenderness, warmth that is in the photos below of the left lower extremity circumferentially from above the ankle to below the knee with a central dusky area.  Neurological: Alert, oriented, thought content logical, coherent   Skin: Warm, dry, no other rashes except the left leg.  Psychiatric: Affect broad and appropriate.              ED Course                 Procedures              Critical Care time:  none               No results found " for this or any previous visit (from the past 24 hour(s)).    Medications   ceFAZolin (ANCEF) intermittent infusion 2 g in 100 mL dextrose PRE-MIX (has no administration in time range)   acetaminophen (TYLENOL) tablet 1,000 mg (has no administration in time range)       Assessments & Plan (with Medical Decision Making)     61-year-old female presented with pain redness and swelling in the left leg that began last evening as noted above and is in the photos above.  She was concerned for a blood clot but I explained to her this is not due to a DVT but due to a skin infection or to an arthropod bite.  The central area that it began and suggested could have been a spider bite prickly since it started when she was out at a campfire.  However explained to her we really cannot easily distinguish between spider bite and cellulitis and we will treat her for the latter.  She received 2 g of Ancef in the ED and will continue on cephalexin.  She is to rest and elevate her leg is much as possible for 2 days and return to be seen if not improved in 2 days or if new or worsening symptoms at any time.  She expressed understanding and her questions were answered.    I have reviewed the nursing notes.    I have reviewed the findings, diagnosis, plan and need for follow up with the patient.       New Prescriptions    CEPHALEXIN (KEFLEX) 500 MG CAPSULE    Take 1 capsule (500 mg) by mouth 4 times daily for 10 days    OXYCODONE (ROXICODONE) 5 MG TABLET    Take 1-2 tablets (5-10 mg) by mouth every 6 hours as needed for pain       Final diagnoses:   Left leg cellulitis       7/31/2022   [facility] EMERGENCY DEPT     Billy Brown MD  07/31/22 [id number]

## 2022-07-31 NOTE — LETTER
July 31, 2022      To Whom It May Concern:      Riana FRIEDMAN Chaddnessa was seen in our Emergency Department today, 07/31/22.  Please excuse her from work July 31 through August 3, 2022, due to an acute medical problem.    Sincerely,        Billy Brown MD

## 2022-07-31 NOTE — ED TRIAGE NOTES
"\" I think I have a blood clot in my left leg\"   Pt reports pain in left leg with red spot around 1600.   Pt drove from [employer] to come to [facility] ED      "

## 2022-08-19 ENCOUNTER — HOSPITAL ENCOUNTER (EMERGENCY)
Facility: CLINIC | Age: 62
Discharge: HOME | End: 2022-08-19
Attending: PHYSICIAN ASSISTANT | Admitting: PHYSICIAN ASSISTANT
Payer: COMMERCIAL

## 2022-08-19 VITALS
SYSTOLIC BLOOD PRESSURE: 146 MMHG | RESPIRATION RATE: 18 BRPM | HEIGHT: 63 IN | DIASTOLIC BLOOD PRESSURE: 79 MMHG | OXYGEN SATURATION: 97 % | WEIGHT: 265 LBS | BODY MASS INDEX: 46.95 KG/M2 | HEART RATE: 48 BPM | TEMPERATURE: 98.8 F

## 2022-08-19 DIAGNOSIS — F41.1 ANXIETY STATE: Primary | ICD-10-CM

## 2022-08-19 DIAGNOSIS — L03.119 CELLULITIS, LEG: ICD-10-CM

## 2022-08-19 PROCEDURE — 99213 OFFICE O/P EST LOW 20 MIN: CPT | Performed by: PHYSICIAN ASSISTANT

## 2022-08-19 PROCEDURE — G0463 HOSPITAL OUTPT CLINIC VISIT: HCPCS | Performed by: PHYSICIAN ASSISTANT

## 2022-08-19 RX ORDER — PAROXETINE 40 MG/1
TABLET, FILM COATED ORAL
Qty: 90 TABLET | Refills: 2 | Status: SHIPPED | OUTPATIENT
Start: 2022-08-19 | End: 2023-05-10

## 2022-08-19 RX ORDER — SULFAMETHOXAZOLE/TRIMETHOPRIM 800-160 MG
1 TABLET ORAL 2 TIMES DAILY
Qty: 20 TABLET | Refills: 0 | Status: SHIPPED | OUTPATIENT
Start: 2022-08-19 | End: 2022-08-29

## 2022-08-19 ASSESSMENT — ACTIVITIES OF DAILY LIVING (ADL): ADLS_ACUITY_SCORE: 35

## 2022-08-19 NOTE — TELEPHONE ENCOUNTER
"Outpatient Medication Detail     Disp Refills Start End ROSA   PARoxetine (PAXIL) 40 MG tablet 90 tablet 3 5/10/2021  No   Sig - Route: Take 1 tablet (40 mg total) by mouth every morning. - Oral   Sent to pharmacy as: PARoxetine 40 mg tablet (PAXIL)   E-Prescribing Status: Receipt confirmed by pharmacy (5/10/2021  8:08 AM CDT)       PARoxetine (PAXIL) 40 MG tablet [[id number]]    Electronically signed by: Roxy Hagen MD on 05/10/21 0808 Status: Active   Ordering user: Roxy Hagen MD 05/10/21 0808 Authorized by: Roxy Hagen MD   Frequency: QAM 05/10/21 - Until Discontinued Released by: Roxy Hagen MD 05/10/21 0808   Diagnoses  Anxiety state [F41.1]     Routing refill request to provider for review/approval because:  A break in medication    Last office visit provider:  5/31/22     Requested Prescriptions   Pending Prescriptions Disp Refills     PARoxetine (PAXIL) 40 MG tablet [Pharmacy Med Name: PAROXETINE 40MG TABLETS] 90 tablet      Sig: TAKE 1 TABLET(40 MG) BY MOUTH EVERY MORNING       SSRIs Protocol Passed - 8/19/2022 11:09 AM        Passed - Recent (12 mo) or future (30 days) visit within the authorizing provider's specialty     Patient has had an office visit with the authorizing provider or a provider within the authorizing providers department within the previous 12 mos or has a future within next 30 days. See \"Patient Info\" tab in inbasket, or \"Choose Columns\" in Meds & Orders section of the refill encounter.              Passed - Medication is active on med list        Passed - Patient is age 18 or older        Passed - No active pregnancy on record        Passed - No positive pregnancy test in last 12 months             Cruzito Johnson RN 08/19/22 11:09 AM  "

## 2022-08-20 NOTE — ED PROVIDER NOTES
History     Chief Complaint   Patient presents with     Cellulitis     HPI  Riana Cui is a 61 year old female past medical history significant for obesity, varicose veins, depression, GERD, struct of sleep apnea, hypercholesterolemia who was recently evaluated and treated in the emergency department for cellulitis versus arthropod bite of her left lower leg treated with Keflex 7/31/2022.  She presents to the urgent care with concern over increased redness of her left lower leg.  She reports she had significant improvement of her left lower leg pain, erythema however states concerned that it may never have completely resolved.  In the last 48 hours she has had recurrence of her pain, increasing erythema, warmth and swelling in her leg.  She denies any fever, chills, myalgias, cough, chest pains, dyspnea, wheezing, vomiting, diarrhea, abdominal pain, calf pain, new onset numbness or paresthesias.      Allergies:  Allergies   Allergen Reactions     Ibuprofen Anaphylaxis     Shellfish-Derived Products Anaphylaxis     Rofecoxib      Other reaction(s): Stomach pain, rash     Problem List:    Patient Active Problem List    Diagnosis Date Noted     Other affections of shoulder region, not elsewhere classified 05/31/2022     Priority: Medium     Formatting of this note might be different from the original.  Created by Conversion       Anxiety      Priority: Medium     Created by Conversion  Replacement Utility updated for latest IMO load         Varicose veins 06/18/2015     Priority: Medium     Symptomatic varicose veins of both lower extremities 06/18/2015     Priority: Medium     Angioedema 06/10/2015     Priority: Medium     Post-gastric Bypass For Obesity      Priority: Medium     Created by Conversion         Morbid obesity (H) 08/12/2014     Priority: Medium     Tobacco use 08/12/2014     Priority: Medium     Depression 12/18/2009     Priority: Medium     GERD (gastroesophageal reflux disease) 12/18/2009      Priority: Medium     Formatting of this note might be different from the original.  hiatal hernia       Glaucoma 2009     Priority: Medium     Hypercholesteremia 2009     Priority: Medium     ANDRIY (obstructive sleep apnea) 2009     Priority: Medium     Personal history of colonic polyps 2009     Priority: Medium     Formatting of this note might be different from the original.  Colonoscopy [facility] 5-28-09. Adenomatous type.  Rescope in 5 years.          Past Medical History:    Past Medical History:   Diagnosis Date     Anaphylactic reaction 06/10/2015     Anxiety      Bladder disorder      Colitis 2014     Obesity      PONV (postoperative nausea and vomiting)        Past Surgical History:    Past Surgical History:   Procedure Laterality Date     ABDOMEN SURGERY        SECTION       CHOLECYSTECTOMY       COLONOSCOPY W/ BIOPSIES N/A 6/3/2021    Procedure: Colonoscopy with polypectomies;  Surgeon: Cruzito Field MD;  Location: [facility] Main OR;  Service: Gastroenterology     GASTRIC BYPASS       HC KNEE SCOPE,SINGLE MENISECTOMY Left 2015    Procedure: LEFT KNEE ARTHROSCOPY , PARTIAL MEDIAL AND LATERAL MENISECTOMY;  Surgeon: Gato Quiñones MD;  Location: [facility] Main OR;  Service: Orthopedics     HERNIA REPAIR, UMBILICAL      X 3       Family History:    Family History   Problem Relation Age of Onset     Heart Disease Mother      Coronary Artery Disease Father      Cancer Brother      Coronary Artery Disease Brother      Cancer Brother        Social History:  Marital Status:   [2]  Social History     Tobacco Use     Smoking status: Current Every Day Smoker     Packs/day: 0.50     Years: 34.00     Pack years: 17.00     Types: Cigarettes, Cigarettes     Smokeless tobacco: Never Used     Tobacco comment: pt declined info   Substance Use Topics     Alcohol use: No     Drug use: No      Medications:    sulfamethoxazole-trimethoprim (BACTRIM DS) 800-160 MG  "tablet  lisinopril (ZESTRIL) 10 MG tablet  oxybutynin ER (DITROPAN XL) 15 MG 24 hr tablet  PARoxetine (PAXIL) 40 MG tablet  PAROXETINE HCL PO      Review of Systems  CONSTITUTIONAL:NEGATIVE for fever, chills, change in weight  INTEGUMENTARY/SKIN: POSITIVE for erythema left lower leg   RESP:NEGATIVE for significant cough or SOB  MUSCULOSKELETAL: POSITIVE  for left lower leg pain, swelling and NEGATIVE for other concerning arthralgias or myalgias   NEURO: NEGATIVE for new onset numbness paresthesias  Physical Exam   BP: (!) 146/79  Pulse: (!) 48  Temp: 98.8  F (37.1  C)  Resp: 18  Height: 160 cm (5' 3\")  Weight: 120.2 kg (265 lb)  SpO2: 97 %  Physical Exam  Constitutional:       General: She is not in acute distress.     Appearance: She is not ill-appearing or toxic-appearing.   HENT:      Head: Normocephalic and atraumatic.   Cardiovascular:      Pulses:           Posterior tibial pulses are 2+ on the left side.      Comments: Varicose veins bilaterally  Musculoskeletal:      Left knee: Normal.      Left lower leg: Swelling and tenderness present. No deformity, lacerations or bony tenderness.      Left ankle: Swelling present. No deformity, ecchymosis or lacerations. No tenderness. Normal range of motion.   Skin:     Findings: Erythema present. No abrasion, ecchymosis, laceration, rash or wound.          Neurological:      Mental Status: She is alert.      GCS: GCS eye subscore is 4. GCS verbal subscore is 5. GCS motor subscore is 6.      Sensory: No sensory deficit.       ED Course           Procedures       Critical Care time:  none          No results found for this or any previous visit (from the past 24 hour(s)).  Medications - No data to display    Assessments & Plan (with Medical Decision Making)     I have reviewed the nursing notes.    I have reviewed the findings, diagnosis, plan and need for follow up with the patient.       Discharge Medication List as of 8/19/2022  5:07 PM      START taking these " medications    Details   sulfamethoxazole-trimethoprim (BACTRIM DS) 800-160 MG tablet Take 1 tablet by mouth 2 times daily for 10 days, Disp-20 tablet, R-0, E-Prescribe           Final diagnoses:   Cellulitis, leg     61-year-old female presents to the urgent care with concern over erythema, warmth, swelling, tenderness palpation at site of previous cellulitis.  Will have erythema, warmth, swelling of the anterior aspect of her left leg which does appear consistent with cellulitis.  Review of prior provider's note does indicate possible arthropod bite which would remain on the differential.  I do not suspect DVT and patient agreed to defer further evaluation. She was discharged home stable with prescription for Bactrim twice daily for 10 days.  Follow-up if no improvement within the next 3 to 4 days.  Worrisome reasons to return to the ER/UC sooner discussed.     Disclaimer: This note consists of symbols derived from keyboarding, dictation, and/or voice recognition software. As a result, there may be errors in the script that have gone undetected.  Please consider this when interpreting information found in the chart.      8/19/2022   [facility] EMERGENCY DEPT     Lizy Gutierrez PA-C  08/20/22 0472

## 2022-09-08 ENCOUNTER — APPOINTMENT (OUTPATIENT)
Dept: CT IMAGING | Facility: CLINIC | Age: 62
DRG: 603 | End: 2022-09-08
Attending: EMERGENCY MEDICINE
Payer: COMMERCIAL

## 2022-09-08 ENCOUNTER — HOSPITAL ENCOUNTER (INPATIENT)
Facility: CLINIC | Age: 62
LOS: 3 days | Discharge: HOME | DRG: 603 | End: 2022-09-11
Attending: EMERGENCY MEDICINE | Admitting: HOSPITALIST
Payer: COMMERCIAL

## 2022-09-08 ENCOUNTER — APPOINTMENT (OUTPATIENT)
Dept: ULTRASOUND IMAGING | Facility: CLINIC | Age: 62
DRG: 603 | End: 2022-09-08
Attending: EMERGENCY MEDICINE
Payer: COMMERCIAL

## 2022-09-08 DIAGNOSIS — I80.02 THROMBOPHLEBITIS OF SUPERFICIAL VEINS OF LEFT LOWER EXTREMITY: ICD-10-CM

## 2022-09-08 DIAGNOSIS — L03.116 CELLULITIS OF LEFT LOWER EXTREMITY: ICD-10-CM

## 2022-09-08 LAB
ALBUMIN SERPL BCG-MCNC: 3.9 G/DL (ref 3.5–5.2)
ALP SERPL-CCNC: 85 U/L (ref 35–104)
ALT SERPL W P-5'-P-CCNC: 13 U/L (ref 10–35)
ANION GAP SERPL CALCULATED.3IONS-SCNC: 13 MMOL/L (ref 7–15)
AST SERPL W P-5'-P-CCNC: 23 U/L (ref 10–35)
BASOPHILS # BLD AUTO: 0.1 10E3/UL (ref 0–0.2)
BASOPHILS NFR BLD AUTO: 0 %
BILIRUB SERPL-MCNC: 0.4 MG/DL
BUN SERPL-MCNC: 13.1 MG/DL (ref 8–23)
CALCIUM SERPL-MCNC: 8.9 MG/DL (ref 8.6–10)
CHLORIDE SERPL-SCNC: 101 MMOL/L (ref 98–107)
CREAT SERPL-MCNC: 0.72 MG/DL (ref 0.51–0.95)
CRP SERPL-MCNC: 9.04 MG/L
DEPRECATED HCO3 PLAS-SCNC: 25 MMOL/L (ref 22–29)
EOSINOPHIL # BLD AUTO: 0.1 10E3/UL (ref 0–0.7)
EOSINOPHIL NFR BLD AUTO: 1 %
ERYTHROCYTE [DISTWIDTH] IN BLOOD BY AUTOMATED COUNT: 14.9 % (ref 10–15)
GFR SERPL CREATININE-BSD FRML MDRD: >90 ML/MIN/1.73M2
GLUCOSE SERPL-MCNC: 106 MG/DL (ref 70–99)
HCT VFR BLD AUTO: 45 % (ref 35–47)
HGB BLD-MCNC: 15 G/DL (ref 11.7–15.7)
IMM GRANULOCYTES # BLD: 0.1 10E3/UL
IMM GRANULOCYTES NFR BLD: 1 %
LYMPHOCYTES # BLD AUTO: 0.8 10E3/UL (ref 0.8–5.3)
LYMPHOCYTES NFR BLD AUTO: 4 %
MCH RBC QN AUTO: 30.2 PG (ref 26.5–33)
MCHC RBC AUTO-ENTMCNC: 33.3 G/DL (ref 31.5–36.5)
MCV RBC AUTO: 91 FL (ref 78–100)
MONOCYTES # BLD AUTO: 1 10E3/UL (ref 0–1.3)
MONOCYTES NFR BLD AUTO: 5 %
NEUTROPHILS # BLD AUTO: 16.6 10E3/UL (ref 1.6–8.3)
NEUTROPHILS NFR BLD AUTO: 89 %
NRBC # BLD AUTO: 0 10E3/UL
NRBC BLD AUTO-RTO: 0 /100
PLATELET # BLD AUTO: 212 10E3/UL (ref 150–450)
POTASSIUM SERPL-SCNC: 4.3 MMOL/L (ref 3.4–5.3)
PROT SERPL-MCNC: 6.8 G/DL (ref 6.4–8.3)
RBC # BLD AUTO: 4.97 10E6/UL (ref 3.8–5.2)
SARS-COV-2 RNA RESP QL NAA+PROBE: NEGATIVE
SODIUM SERPL-SCNC: 139 MMOL/L (ref 136–145)
WBC # BLD AUTO: 18.7 10E3/UL (ref 4–11)

## 2022-09-08 PROCEDURE — 36415 COLL VENOUS BLD VENIPUNCTURE: CPT | Performed by: EMERGENCY MEDICINE

## 2022-09-08 PROCEDURE — 99285 EMERGENCY DEPT VISIT HI MDM: CPT | Mod: 25

## 2022-09-08 PROCEDURE — 250N000013 HC RX MED GY IP 250 OP 250 PS 637: Performed by: EMERGENCY MEDICINE

## 2022-09-08 PROCEDURE — 99285 EMERGENCY DEPT VISIT HI MDM: CPT | Mod: 25 | Performed by: EMERGENCY MEDICINE

## 2022-09-08 PROCEDURE — 250N000009 HC RX 250: Performed by: EMERGENCY MEDICINE

## 2022-09-08 PROCEDURE — 87635 SARS-COV-2 COVID-19 AMP PRB: CPT | Performed by: EMERGENCY MEDICINE

## 2022-09-08 PROCEDURE — 80053 COMPREHEN METABOLIC PANEL: CPT | Performed by: EMERGENCY MEDICINE

## 2022-09-08 PROCEDURE — 250N000011 HC RX IP 250 OP 636: Performed by: PHYSICIAN ASSISTANT

## 2022-09-08 PROCEDURE — C9803 HOPD COVID-19 SPEC COLLECT: HCPCS

## 2022-09-08 PROCEDURE — 85004 AUTOMATED DIFF WBC COUNT: CPT | Performed by: EMERGENCY MEDICINE

## 2022-09-08 PROCEDURE — 250N000011 HC RX IP 250 OP 636: Performed by: EMERGENCY MEDICINE

## 2022-09-08 PROCEDURE — G0378 HOSPITAL OBSERVATION PER HR: HCPCS

## 2022-09-08 PROCEDURE — 250N000013 HC RX MED GY IP 250 OP 250 PS 637: Performed by: PHYSICIAN ASSISTANT

## 2022-09-08 PROCEDURE — 73701 CT LOWER EXTREMITY W/DYE: CPT | Mod: LT

## 2022-09-08 PROCEDURE — 99219 PR INITIAL OBSERVATION CARE,LEVEL II: CPT | Performed by: PHYSICIAN ASSISTANT

## 2022-09-08 PROCEDURE — 96376 TX/PRO/DX INJ SAME DRUG ADON: CPT

## 2022-09-08 PROCEDURE — 96365 THER/PROPH/DIAG IV INF INIT: CPT | Mod: 59

## 2022-09-08 PROCEDURE — 86140 C-REACTIVE PROTEIN: CPT | Performed by: PHYSICIAN ASSISTANT

## 2022-09-08 PROCEDURE — 87641 MR-STAPH DNA AMP PROBE: CPT | Performed by: PHYSICIAN ASSISTANT

## 2022-09-08 PROCEDURE — 120N000001 HC R&B MED SURG/OB

## 2022-09-08 PROCEDURE — 93971 EXTREMITY STUDY: CPT | Mod: LT

## 2022-09-08 RX ORDER — ACETAMINOPHEN 325 MG/1
650 TABLET ORAL EVERY 6 HOURS PRN
Status: DISCONTINUED | OUTPATIENT
Start: 2022-09-08 | End: 2022-09-11 | Stop reason: HOSPADM

## 2022-09-08 RX ORDER — IOPAMIDOL 755 MG/ML
90 INJECTION, SOLUTION INTRAVASCULAR ONCE
Status: COMPLETED | OUTPATIENT
Start: 2022-09-08 | End: 2022-09-08

## 2022-09-08 RX ORDER — NALOXONE HYDROCHLORIDE 0.4 MG/ML
0.2 INJECTION, SOLUTION INTRAMUSCULAR; INTRAVENOUS; SUBCUTANEOUS
Status: DISCONTINUED | OUTPATIENT
Start: 2022-09-08 | End: 2022-09-11 | Stop reason: HOSPADM

## 2022-09-08 RX ORDER — CEFAZOLIN SODIUM 2 G/100ML
2 INJECTION, SOLUTION INTRAVENOUS ONCE
Status: COMPLETED | OUTPATIENT
Start: 2022-09-08 | End: 2022-09-08

## 2022-09-08 RX ORDER — CEFAZOLIN SODIUM 2 G/100ML
2 INJECTION, SOLUTION INTRAVENOUS EVERY 8 HOURS
Status: DISCONTINUED | OUTPATIENT
Start: 2022-09-08 | End: 2022-09-11 | Stop reason: HOSPADM

## 2022-09-08 RX ORDER — ACETAMINOPHEN 500 MG
1000 TABLET ORAL EVERY 6 HOURS PRN
COMMUNITY
End: 2022-12-22

## 2022-09-08 RX ORDER — ACETAMINOPHEN 500 MG
1000 TABLET ORAL ONCE
Status: COMPLETED | OUTPATIENT
Start: 2022-09-08 | End: 2022-09-08

## 2022-09-08 RX ORDER — CEFAZOLIN SODIUM 2 G/100ML
2 INJECTION, SOLUTION INTRAVENOUS ONCE
Status: DISCONTINUED | OUTPATIENT
Start: 2022-09-08 | End: 2022-09-08

## 2022-09-08 RX ORDER — NALOXONE HYDROCHLORIDE 0.4 MG/ML
0.4 INJECTION, SOLUTION INTRAMUSCULAR; INTRAVENOUS; SUBCUTANEOUS
Status: DISCONTINUED | OUTPATIENT
Start: 2022-09-08 | End: 2022-09-11 | Stop reason: HOSPADM

## 2022-09-08 RX ORDER — PROCHLORPERAZINE 25 MG
25 SUPPOSITORY, RECTAL RECTAL EVERY 12 HOURS PRN
Status: DISCONTINUED | OUTPATIENT
Start: 2022-09-08 | End: 2022-09-11 | Stop reason: HOSPADM

## 2022-09-08 RX ORDER — ONDANSETRON 4 MG/1
4 TABLET, ORALLY DISINTEGRATING ORAL EVERY 6 HOURS PRN
Status: DISCONTINUED | OUTPATIENT
Start: 2022-09-08 | End: 2022-09-11 | Stop reason: HOSPADM

## 2022-09-08 RX ORDER — ACETAMINOPHEN 650 MG/1
650 SUPPOSITORY RECTAL EVERY 6 HOURS PRN
Status: DISCONTINUED | OUTPATIENT
Start: 2022-09-08 | End: 2022-09-11 | Stop reason: HOSPADM

## 2022-09-08 RX ORDER — PROCHLORPERAZINE MALEATE 5 MG
10 TABLET ORAL EVERY 6 HOURS PRN
Status: DISCONTINUED | OUTPATIENT
Start: 2022-09-08 | End: 2022-09-11 | Stop reason: HOSPADM

## 2022-09-08 RX ORDER — ONDANSETRON 2 MG/ML
4 INJECTION INTRAMUSCULAR; INTRAVENOUS EVERY 6 HOURS PRN
Status: DISCONTINUED | OUTPATIENT
Start: 2022-09-08 | End: 2022-09-11 | Stop reason: HOSPADM

## 2022-09-08 RX ORDER — PAROXETINE 20 MG/1
40 TABLET, FILM COATED ORAL DAILY
Status: DISCONTINUED | OUTPATIENT
Start: 2022-09-09 | End: 2022-09-11 | Stop reason: HOSPADM

## 2022-09-08 RX ORDER — LIDOCAINE 40 MG/G
CREAM TOPICAL
Status: DISCONTINUED | OUTPATIENT
Start: 2022-09-08 | End: 2022-09-11 | Stop reason: HOSPADM

## 2022-09-08 RX ADMIN — IOPAMIDOL 90 ML: 755 INJECTION, SOLUTION INTRAVENOUS at 13:52

## 2022-09-08 RX ADMIN — RIVAROXABAN 10 MG: 10 TABLET, FILM COATED ORAL at 22:27

## 2022-09-08 RX ADMIN — CEFAZOLIN SODIUM 2 G: 2 INJECTION, SOLUTION INTRAVENOUS at 22:14

## 2022-09-08 RX ADMIN — ACETAMINOPHEN 650 MG: 325 TABLET, FILM COATED ORAL at 22:27

## 2022-09-08 RX ADMIN — CEFAZOLIN SODIUM 2 G: 2 INJECTION, SOLUTION INTRAVENOUS at 13:46

## 2022-09-08 RX ADMIN — SODIUM CHLORIDE 72 ML: 9 INJECTION, SOLUTION INTRAVENOUS at 13:53

## 2022-09-08 RX ADMIN — MICONAZOLE NITRATE: 20 POWDER TOPICAL at 22:27

## 2022-09-08 RX ADMIN — NICOTINE POLACRILEX 2 MG: 2 GUM, CHEWING BUCCAL at 16:47

## 2022-09-08 RX ADMIN — ACETAMINOPHEN 1000 MG: 500 TABLET, FILM COATED ORAL at 14:35

## 2022-09-08 ASSESSMENT — ACTIVITIES OF DAILY LIVING (ADL)
CHANGE_IN_FUNCTIONAL_STATUS_SINCE_ONSET_OF_CURRENT_ILLNESS/INJURY: NO
VISION_MANAGEMENT: GLASSES
FALL_HISTORY_WITHIN_LAST_SIX_MONTHS: NO
DOING_ERRANDS_INDEPENDENTLY_DIFFICULTY: NO
ADLS_ACUITY_SCORE: 35
ADLS_ACUITY_SCORE: 35
DIFFICULTY_COMMUNICATING: NO
ADLS_ACUITY_SCORE: 20
ADLS_ACUITY_SCORE: 20
WALKING_OR_CLIMBING_STAIRS_DIFFICULTY: NO
ADLS_ACUITY_SCORE: 35
CONCENTRATING,_REMEMBERING_OR_MAKING_DECISIONS_DIFFICULTY: NO
ADLS_ACUITY_SCORE: 20
HEARING_DIFFICULTY_OR_DEAF: NO
TOILETING_ISSUES: NO
WEAR_GLASSES_OR_BLIND: YES
DIFFICULTY_EATING/SWALLOWING: NO
DRESSING/BATHING_DIFFICULTY: NO

## 2022-09-08 NOTE — ED NOTES
Pt presents to ED with c/o left leg redness, swelling, warmth, and pain. Pt has been seen x 2 in the past 1-2 months for cellulitis in LLE. Pt has been prescribed abx x 2 without much improvement in LE. Pt contacted PCP and was advised to present to ED for US for DVT. Fever started this morning, pt also reports feeling fatigued/weak.

## 2022-09-08 NOTE — ED PROVIDER NOTES
"     Emergency Department Patient Sign-out       Brief HPI:  This is a 61 year old female signed out to me by . Dr. Rancho Trinidad at 3:05 PM.  See initial ED Provider note for details of the presentation.     Significant Events prior to my assuming care: Laboratory evaluation remarkable for elevated WBC 18.7, but no hypotension or tachycardia.  CT of the left lower extremity with IV contrast showed no evidence of deep space infection or osteomyelitis.  IV Ancef given.  Ultrasound left lower extremity pending.      Exam:   Patient Vitals for the past 24 hrs:   BP Temp Temp src Pulse Resp SpO2 Height Weight   09/08/22 1435 -- 99.1  F (37.3  C) Oral -- -- -- -- --   09/08/22 1042 (!) 152/97 (!) 100.8  F (38.2  C) Tympanic 74 20 95 % 1.6 m (5' 3\") 120.2 kg (265 lb)           ED RESULTS:   Results for orders placed or performed during the hospital encounter of 09/08/22 (from the past 24 hour(s))   CBC with platelets differential     Status: Abnormal    Collection Time: 09/08/22  1:42 PM    Narrative    The following orders were created for panel order CBC with platelets differential.  Procedure                               Abnormality         Status                     ---------                               -----------         ------                     CBC with platelets and d...[[id number]]  Abnormal            Final result                 Please view results for these tests on the individual orders.   Comprehensive metabolic panel     Status: Abnormal    Collection Time: 09/08/22  1:42 PM   Result Value Ref Range    Sodium 139 136 - 145 mmol/L    Potassium 4.3 3.4 - 5.3 mmol/L    Creatinine 0.72 0.51 - 0.95 mg/dL    Urea Nitrogen 13.1 8.0 - 23.0 mg/dL    Chloride 101 98 - 107 mmol/L    Carbon Dioxide (CO2) 25 22 - 29 mmol/L    Anion Gap 13 7 - 15 mmol/L    Glucose 106 (H) 70 - 99 mg/dL    Calcium 8.9 8.6 - 10.0 mg/dL    Protein Total 6.8 6.4 - 8.3 g/dL    Albumin 3.9 3.5 - 5.2 g/dL    Bilirubin Total 0.4 <=1.2 " mg/dL    Alkaline Phosphatase 85 35 - 104 U/L    AST 23 10 - 35 U/L    ALT 13 10 - 35 U/L    GFR Estimate >90 >60 mL/min/1.73m2   CBC with platelets and differential     Status: Abnormal    Collection Time: 09/08/22  1:42 PM   Result Value Ref Range    WBC Count 18.7 (H) 4.0 - 11.0 10e3/uL    RBC Count 4.97 3.80 - 5.20 10e6/uL    Hemoglobin 15.0 11.7 - 15.7 g/dL    Hematocrit 45.0 35.0 - 47.0 %    MCV 91 78 - 100 fL    MCH 30.2 26.5 - 33.0 pg    MCHC 33.3 31.5 - 36.5 g/dL    RDW 14.9 10.0 - 15.0 %    Platelet Count 212 150 - 450 10e3/uL    % Neutrophils 89 %    % Lymphocytes 4 %    % Monocytes 5 %    % Eosinophils 1 %    % Basophils 0 %    % Immature Granulocytes 1 %    NRBCs per 100 WBC 0 <1 /100    Absolute Neutrophils 16.6 (H) 1.6 - 8.3 10e3/uL    Absolute Lymphocytes 0.8 0.8 - 5.3 10e3/uL    Absolute Monocytes 1.0 0.0 - 1.3 10e3/uL    Absolute Eosinophils 0.1 0.0 - 0.7 10e3/uL    Absolute Basophils 0.1 0.0 - 0.2 10e3/uL    Absolute Immature Granulocytes 0.1 <=0.4 10e3/uL    Absolute NRBCs 0.0 10e3/uL   CT Tibia Fibula Lower Leg Left w Contrast     Status: None    Collection Time: 09/08/22  2:23 PM    Narrative    CT TIBIA AND FIBULA LOWER LEG LEFT WITH CONTRAST, 9/8/2022 2:23 PM    TECHNIQUE: Helical CT images of the left calf were obtained with IV  contrast. Contrast dose: 90mL Isovue-370 dose reduction techniques  were used. Two-dimensional coronal and sagittal reformats performed.    HISTORY: Calf pain and cellulitis. Low-grade fever.    FINDINGS:  No evidence of osteomyelitis. No fracture.    Mild circumferential subcutaneous cellulitis in the distal half of the  calf. No discrete fluid collection to suggest an abscess. No muscle  atrophy. Tiny popliteal cyst. Moderate degenerative changes in the  patellofemoral compartment of the knee.      Impression    IMPRESSION: No abscess or osteomyelitis.    Mild circumferential subcutaneous cellulitis in the distal half of the  calf.     TONA TOBAR MD          SYSTEM ID:  [id number]    Lower Extremity Venous Duplex Left     Status: None    Collection Time: 09/08/22  3:56 PM    Narrative    VENOUS ULTRASOUND LEFT LOWER EXTREMITY  9/8/2022 3:56 PM     HISTORY: Swelling, redness, pain.    COMPARISON: None.    TECHNIQUE: Color Doppler and spectral waveform analysis performed  throughout the deep veins of the left lower extremity.    FINDINGS: The left common femoral, femoral, and popliteal veins  demonstrate normal blood flow and augmentation. Posterior tibial and  peroneal veins are compressible. Thrombus is noted in the greater  saphenous vein. Thrombosed varicosities noted in the right proximal  thigh and proximal greater saphenous vein, though incompletely  visualized. Right common femoral vein is patent.      Impression    IMPRESSION:  1. Negative for DVT in the left lower extremity.  2. Superficial thrombophlebitis in the left greater saphenous vein.  3. Superficial thrombophlebitis in the right proximal greater  saphenous vein with thrombosed varicosities, though incompletely  evaluated in the right lower chest    ZAINAB COHEN MD         SYSTEM ID:  [id number]   Asymptomatic COVID-19 Virus (Coronavirus) by PCR Nasopharyngeal     Status: Normal    Collection Time: 09/08/22  4:48 PM    Specimen: Nasopharyngeal; Swab   Result Value Ref Range    SARS CoV2 PCR Negative Negative    Narrative    Testing was performed using the alison  SARS-CoV-2 & Influenza A/B Assay on the alison  [employer]  System.  This test should be ordered for the detection of SARS-COV-2 in individuals who meet SARS-CoV-2 clinical and/or epidemiological criteria. Test performance is unknown in asymptomatic patients.  This test is for in vitro diagnostic use under the FDA EUA for laboratories certified under CLIA to perform moderate and/or high complexity testing. This test has not been FDA cleared or approved.  A negative test does not rule out the presence of PCR inhibitors in the specimen or target RNA in  concentration below the limit of detection for the assay. The possibility of a false negative should be considered if the patient's recent exposure or clinical presentation suggests COVID-19.  [facility] are certified under the Clinical Laboratory Improvement Amendments of 1988 (CLIA-88) as qualified to perform moderate and/or high complexity laboratory testing.       ED MEDICATIONS:   Medications   nicotine (NICORETTE) gum 2 mg (2 mg Buccal Given 9/8/22 1647)   iopamidol (ISOVUE-370) solution 90 mL (90 mLs Intravenous Given 9/8/22 1352)   sodium chloride 0.9 % bag 500mL for CT scan flush use (72 mLs Intravenous Given 9/8/22 1353)   ceFAZolin (ANCEF) intermittent infusion 2 g in 100 mL dextrose PRE-MIX (0 g Intravenous Stopped 9/8/22 1415)   acetaminophen (TYLENOL) tablet 1,000 mg (1,000 mg Oral Given 9/8/22 1435)         Impression:    ICD-10-CM    1. Cellulitis of left lower extremity  L03.116    2. Thrombophlebitis of superficial veins of left lower extremity  I80.02        Plan:    Pending studies: Left lower extremity ultrasound evaluation.      4:06 PM - Left lower extremity ultrasound evaluation was negative for DVT.  She has superficial thrombophlebitis.     4:29 PM - No beds available here, she prefers discharge and infusion therapy.    4:40 PM -a bed became available here, patient is amenable to admission.  I reviewed the case with Jayleen SANABRIA for the hospitalist service.  She excepted care of the patient upon admission.        Good Jones MD  09/08/22 [id number]

## 2022-09-08 NOTE — ED PROVIDER NOTES
History     Chief Complaint   Patient presents with     Leg Pain     Pt reports she has been on oral antibiotics X2 for cellulitis, pt reports she worsening swelling pain and redness to left lower leg. Pt temp in triage 100.8     HPI  Riana Cui is a 61 year old female who presents with left shin swelling pain and redness waxing and waning since end of July, initially treated with a course of cephalexin with near resolution, recurrence prompted evaluation 8/19, treated with course of Bactrim, findings near resolution but recurred.  No history of DVT/pulmonary embolism.  Patient has felt chilled with low-grade temp.  No nausea vomiting.      Allergies:  Allergies   Allergen Reactions     Ibuprofen Anaphylaxis     Shellfish-Derived Products Anaphylaxis     Rofecoxib      Other reaction(s): Stomach pain, rash       Problem List:    Patient Active Problem List    Diagnosis Date Noted     Cellulitis of left lower extremity 09/08/2022     Priority: Medium     Other affections of shoulder region, not elsewhere classified 05/31/2022     Priority: Medium     Formatting of this note might be different from the original.  Created by Conversion       Anxiety      Priority: Medium     Created by Conversion  Replacement Utility updated for latest IMO load         Varicose veins 06/18/2015     Priority: Medium     Symptomatic varicose veins of both lower extremities 06/18/2015     Priority: Medium     Angioedema 06/10/2015     Priority: Medium     Post-gastric Bypass For Obesity      Priority: Medium     Created by Conversion         Morbid obesity (H) 08/12/2014     Priority: Medium     Tobacco use 08/12/2014     Priority: Medium     Depression 12/18/2009     Priority: Medium     GERD (gastroesophageal reflux disease) 12/18/2009     Priority: Medium     Formatting of this note might be different from the original.  hiatal hernia       Glaucoma 12/18/2009     Priority: Medium     Hypercholesteremia 12/18/2009     Priority:  Medium     ANDRIY (obstructive sleep apnea) 2009     Priority: Medium     Personal history of colonic polyps 2009     Priority: Medium     Formatting of this note might be different from the original.  Colonoscopy [facility] 5-28-09. Adenomatous type.  Rescope in 5 years.          Past Medical History:    Past Medical History:   Diagnosis Date     Anaphylactic reaction 06/10/2015     Anxiety      Bladder disorder      Colitis 2014     Obesity      PONV (postoperative nausea and vomiting)        Past Surgical History:    Past Surgical History:   Procedure Laterality Date     ABDOMEN SURGERY        SECTION       CHOLECYSTECTOMY       COLONOSCOPY W/ BIOPSIES N/A 6/3/2021    Procedure: Colonoscopy with polypectomies;  Surgeon: Cruzito Field MD;  Location: [facility] OR;  Service: Gastroenterology     GASTRIC BYPASS       HC KNEE SCOPE,SINGLE MENISECTOMY Left 2015    Procedure: LEFT KNEE ARTHROSCOPY , PARTIAL MEDIAL AND LATERAL MENISECTOMY;  Surgeon: Gato Quiñones MD;  Location: [facility] OR;  Service: Orthopedics     HERNIA REPAIR, UMBILICAL      X 3       Family History:    Family History   Problem Relation Age of Onset     Heart Disease Mother      Coronary Artery Disease Father      Cancer Brother      Coronary Artery Disease Brother      Cancer Brother        Social History:  Marital Status:   [2]  Social History     Tobacco Use     Smoking status: Current Every Day Smoker     Packs/day: 0.50     Years: 34.00     Pack years: 17.00     Types: Cigarettes, Cigarettes     Smokeless tobacco: Never Used     Tobacco comment: pt declined info   Substance Use Topics     Alcohol use: No     Drug use: No        Medications:    lisinopril (ZESTRIL) 10 MG tablet  oxybutynin ER (DITROPAN XL) 15 MG 24 hr tablet  PARoxetine (PAXIL) 40 MG tablet  PAROXETINE HCL PO          Review of Systems  All other systems reviewed and are negative.    Physical Exam   BP: (!) 152/97  Pulse:  "74  Temp: (!) 100.8  F (38.2  C)  Resp: 20  Height: 160 cm (5' 3\")  Weight: 120.2 kg (265 lb)  SpO2: 95 %      Physical Exam  Nontoxic-appearing no respiratory distress alert and oriented  Skin pink warm dry  Strength and sensation grossly tact at the extremities  Left lower extremity shows erythema over the shin with warmth mild swelling and tenderness.            ED Course                 Procedures              Critical Care time:  none                  Results for orders placed or performed during the hospital encounter of 09/08/22 (from the past 24 hour(s))   CBC with platelets differential    Narrative    The following orders were created for panel order CBC with platelets differential.  Procedure                               Abnormality         Status                     ---------                               -----------         ------                     CBC with platelets and d...[[id number]]  Abnormal            Final result                 Please view results for these tests on the individual orders.   Comprehensive metabolic panel   Result Value Ref Range    Sodium 139 136 - 145 mmol/L    Potassium 4.3 3.4 - 5.3 mmol/L    Creatinine 0.72 0.51 - 0.95 mg/dL    Urea Nitrogen 13.1 8.0 - 23.0 mg/dL    Chloride 101 98 - 107 mmol/L    Carbon Dioxide (CO2) 25 22 - 29 mmol/L    Anion Gap 13 7 - 15 mmol/L    Glucose 106 (H) 70 - 99 mg/dL    Calcium 8.9 8.6 - 10.0 mg/dL    Protein Total 6.8 6.4 - 8.3 g/dL    Albumin 3.9 3.5 - 5.2 g/dL    Bilirubin Total 0.4 <=1.2 mg/dL    Alkaline Phosphatase 85 35 - 104 U/L    AST 23 10 - 35 U/L    ALT 13 10 - 35 U/L    GFR Estimate >90 >60 mL/min/1.73m2   CBC with platelets and differential   Result Value Ref Range    WBC Count 18.7 (H) 4.0 - 11.0 10e3/uL    RBC Count 4.97 3.80 - 5.20 10e6/uL    Hemoglobin 15.0 11.7 - 15.7 g/dL    Hematocrit 45.0 35.0 - 47.0 %    MCV 91 78 - 100 fL    MCH 30.2 26.5 - 33.0 pg    MCHC 33.3 31.5 - 36.5 g/dL    RDW 14.9 10.0 - 15.0 %    Platelet " Count 212 150 - 450 10e3/uL    % Neutrophils 89 %    % Lymphocytes 4 %    % Monocytes 5 %    % Eosinophils 1 %    % Basophils 0 %    % Immature Granulocytes 1 %    NRBCs per 100 WBC 0 <1 /100    Absolute Neutrophils 16.6 (H) 1.6 - 8.3 10e3/uL    Absolute Lymphocytes 0.8 0.8 - 5.3 10e3/uL    Absolute Monocytes 1.0 0.0 - 1.3 10e3/uL    Absolute Eosinophils 0.1 0.0 - 0.7 10e3/uL    Absolute Basophils 0.1 0.0 - 0.2 10e3/uL    Absolute Immature Granulocytes 0.1 <=0.4 10e3/uL    Absolute NRBCs 0.0 10e3/uL   CT Tibia Fibula Lower Leg Left w Contrast    Narrative    CT TIBIA AND FIBULA LOWER LEG LEFT WITH CONTRAST, 9/8/2022 2:23 PM    TECHNIQUE: Helical CT images of the left calf were obtained with IV  contrast. Contrast dose: 90mL Isovue-370 dose reduction techniques  were used. Two-dimensional coronal and sagittal reformats performed.    HISTORY: Calf pain and cellulitis. Low-grade fever.    FINDINGS:  No evidence of osteomyelitis. No fracture.    Mild circumferential subcutaneous cellulitis in the distal half of the  calf. No discrete fluid collection to suggest an abscess. No muscle  atrophy. Tiny popliteal cyst. Moderate degenerative changes in the  patellofemoral compartment of the knee.      Impression    IMPRESSION: No abscess or osteomyelitis.    Mild circumferential subcutaneous cellulitis in the distal half of the  calf.        Medications   iopamidol (ISOVUE-370) solution 90 mL (90 mLs Intravenous Given 9/8/22 1352)   sodium chloride 0.9 % bag 500mL for CT scan flush use (72 mLs Intravenous Given 9/8/22 1353)   ceFAZolin (ANCEF) intermittent infusion 2 g in 100 mL dextrose PRE-MIX (2 g Intravenous New Bag 9/8/22 1346)   acetaminophen (TYLENOL) tablet 1,000 mg (1,000 mg Oral Given 9/8/22 1435)       Assessments & Plan (with Medical Decision Making)  Recurrent/persistent left lower extremity cellulitis, CT scan negative for deep tissue infection or osteomyelitis.  White count elevated 18,000.  Patient febrile.   Failed 2 courses of oral antibiotics.  Admitted for IV antibiotic therapy.  Ultrasound left lower extremity pending.  Signed out to Dr. Jones at change of shift.     I have reviewed the nursing notes.    I have reviewed the findings, diagnosis, plan and need for follow up with the patient.       New Prescriptions    No medications on file       Final diagnoses:   Cellulitis of left lower extremity       9/8/2022   [facility] EMERGENCY DEPT     Rancho Trinidad MD  09/08/22 [id number]

## 2022-09-08 NOTE — ED TRIAGE NOTES
Pt reports she has been on oral antibiotics X2 for cellulitis, pt reports she worsening swelling pain and redness to left lower leg. Pt temp in triage 100.8     Triage Assessment     Row Name 09/08/22 1043       [place] Coma Scale    Best Eye Response 4-->(E4) spontaneous    Best Motor Response 6-->(M6) obeys commands    Best Verbal Response 5-->(V5) oriented    [place] Coma Scale Score 15

## 2022-09-08 NOTE — PROGRESS NOTES
"[place] [facility] ADMISSION NOTE    Patient admitted to room 2305 at approximately 1800 via cart from emergency room. Patient was accompanied by transport tech.     Verbal SBAR report received from AIDEN Singleton prior to patient arrival.     Patient ambulated to bed independently. Patient alert and oriented X 3. Pain is controlled without any medications.  . Admission vital signs: Blood pressure (!) 164/68, pulse 67, temperature 99.1  F (37.3  C), temperature source Oral, resp. rate 18, height 1.6 m (5' 3\"), weight 122.2 kg (269 lb 6.4 oz), SpO2 97 %. Patient was oriented to plan of care, call light, bed controls, tv, telephone, bathroom and visiting hours.     Risk Assessment    The following safety risks were identified during admission: none. Yellow risk band applied: YES.     Skin Initial Assessment    This writer admitted this patient and completed a full skin assessment and Souleymane score in the Adult PCS flowsheet. Appropriate interventions initiated as needed.        Education    Patient has a [place] to Observation order: Yes  Observation education completed and documented: Yes      Guillermina Camejo RN    "

## 2022-09-09 LAB
ANION GAP SERPL CALCULATED.3IONS-SCNC: 7 MMOL/L (ref 7–15)
BUN SERPL-MCNC: 11.2 MG/DL (ref 8–23)
CALCIUM SERPL-MCNC: 8.4 MG/DL (ref 8.6–10)
CHLORIDE SERPL-SCNC: 102 MMOL/L (ref 98–107)
CREAT SERPL-MCNC: 0.75 MG/DL (ref 0.51–0.95)
CRP SERPL-MCNC: 71.96 MG/L
DEPRECATED HCO3 PLAS-SCNC: 27 MMOL/L (ref 22–29)
ERYTHROCYTE [DISTWIDTH] IN BLOOD BY AUTOMATED COUNT: 14.8 % (ref 10–15)
GFR SERPL CREATININE-BSD FRML MDRD: >90 ML/MIN/1.73M2
GLUCOSE SERPL-MCNC: 97 MG/DL (ref 70–99)
HCT VFR BLD AUTO: 37.7 % (ref 35–47)
HGB BLD-MCNC: 12.4 G/DL (ref 11.7–15.7)
MCH RBC QN AUTO: 29.7 PG (ref 26.5–33)
MCHC RBC AUTO-ENTMCNC: 32.9 G/DL (ref 31.5–36.5)
MCV RBC AUTO: 90 FL (ref 78–100)
MRSA DNA SPEC QL NAA+PROBE: NEGATIVE
PLATELET # BLD AUTO: 173 10E3/UL (ref 150–450)
POTASSIUM SERPL-SCNC: 3.7 MMOL/L (ref 3.4–5.3)
RBC # BLD AUTO: 4.17 10E6/UL (ref 3.8–5.2)
SA TARGET DNA: NEGATIVE
SODIUM SERPL-SCNC: 136 MMOL/L (ref 136–145)
WBC # BLD AUTO: 11.8 10E3/UL (ref 4–11)

## 2022-09-09 PROCEDURE — 85027 COMPLETE CBC AUTOMATED: CPT | Performed by: PHYSICIAN ASSISTANT

## 2022-09-09 PROCEDURE — G0378 HOSPITAL OBSERVATION PER HR: HCPCS

## 2022-09-09 PROCEDURE — 86140 C-REACTIVE PROTEIN: CPT | Performed by: PHYSICIAN ASSISTANT

## 2022-09-09 PROCEDURE — 80048 BASIC METABOLIC PNL TOTAL CA: CPT | Performed by: PHYSICIAN ASSISTANT

## 2022-09-09 PROCEDURE — 250N000013 HC RX MED GY IP 250 OP 250 PS 637: Performed by: PHYSICIAN ASSISTANT

## 2022-09-09 PROCEDURE — 96376 TX/PRO/DX INJ SAME DRUG ADON: CPT

## 2022-09-09 PROCEDURE — 250N000011 HC RX IP 250 OP 636: Performed by: PHYSICIAN ASSISTANT

## 2022-09-09 PROCEDURE — 120N000001 HC R&B MED SURG/OB

## 2022-09-09 PROCEDURE — 99225 PR SUBSEQUENT OBSERVATION CARE,LEVEL II: CPT | Performed by: INTERNAL MEDICINE

## 2022-09-09 PROCEDURE — 36415 COLL VENOUS BLD VENIPUNCTURE: CPT | Performed by: PHYSICIAN ASSISTANT

## 2022-09-09 RX ADMIN — ACETAMINOPHEN 650 MG: 325 TABLET, FILM COATED ORAL at 06:09

## 2022-09-09 RX ADMIN — CEFAZOLIN SODIUM 2 G: 2 INJECTION, SOLUTION INTRAVENOUS at 06:09

## 2022-09-09 RX ADMIN — RIVAROXABAN 15 MG: 15 TABLET, FILM COATED ORAL at 09:05

## 2022-09-09 RX ADMIN — MICONAZOLE NITRATE: 20 POWDER TOPICAL at 09:06

## 2022-09-09 RX ADMIN — CEFAZOLIN SODIUM 2 G: 2 INJECTION, SOLUTION INTRAVENOUS at 21:26

## 2022-09-09 RX ADMIN — CEFAZOLIN SODIUM 2 G: 2 INJECTION, SOLUTION INTRAVENOUS at 14:17

## 2022-09-09 RX ADMIN — RIVAROXABAN 15 MG: 15 TABLET, FILM COATED ORAL at 16:32

## 2022-09-09 RX ADMIN — MICONAZOLE NITRATE: 20 POWDER TOPICAL at 19:49

## 2022-09-09 RX ADMIN — PAROXETINE HYDROCHLORIDE 40 MG: 20 TABLET, FILM COATED ORAL at 09:05

## 2022-09-09 ASSESSMENT — ACTIVITIES OF DAILY LIVING (ADL)
ADLS_ACUITY_SCORE: 20

## 2022-09-09 NOTE — PROGRESS NOTES
[facility] Internal Medicine Progress Note     Date of Service (when I saw the patient): 09/09/2022    REASON FOR ADMISSION / INTERVAL HISTORY:  Riana Cui is a 61 year old female admitted on 9/8/2022. She has history of depression and anxiety, varicose veins and obesity.  She presents due to recurrent cellulitis in her left leg    ASSESSMENT/PLAN:     Cellulitis of the left lower extremity, recurrent  Erythema, warmth, tenderness of the left lower extremity.  Associated fever, chills, malaise.  Previously treated with 10-day course of cephalexin starting 7/31/2022 and 10-day course of Bactrim starting 8/19/2022.  Erythema fully resolved with both antibiotic regimens though recurred, initially few days after completing cephalexin and just recently after 10 days after completing Bactrim.  Labs notable for WBC up to 18.7.  Vitals showed temp to 100.8 otherwise stable.  CT of the leg did not show any concerning signs for deeper infection.  It is unclear why cellulitis is recurrent despite treatment. Ultrasound showed superficial thrombophlebitis as noted below, unclear if this may be contributing to ongoing infection.  MRSA negative. Wbc improving. CRP higher  -continue ancef, follow labs     Superficial thrombophlebitis of the left greater saphenous vein and right greater saphenous vein  Varicose veins  Duplex ultrasound of the lower extremities shows superficial thrombophlebitis of the left greater saphenous vein and right greater saphenous vein.  Reviewed up-to-date and may consider prophylactic versus therapeutic anticoagulation based on size, location and risk factors for VTE.  Given she has clot in the greater saphenous vein which appears to be greater than 5 cm and also has higher risk factor for VTE with history of varicose veins will opt to treat with anticoagulation, initially started on prophylactic though will increase to full dose given VTE risk factor.  Of note both her brother and her sister  "have history of blood clots.  She is not aware of any specific disorder/hypercoagulable disease.  Unclear if this may also contribute to recurrent cellulitis (i.e. edema).  -Rivaroxaban 15 mg twice daily for 21 days followed by 20 mg once daily for 3 months  -Recommend repeat ultrasound prior to completing 3 months of therapy  -consider discussing further with hematology  -Consider follow-up with vascular for varicosities     Depression and anxiety  Previously diagnosed.  -Continue home paroxetine     Obesity  Body mass index is 47.72 kg/m . . Contributes to overall morbidity and mortality.          ADRIANE PAYTON MD   Pg [phone]    DVT Prhylaxis: DOAC  Code Status: Full Code    ROS:  As described in A/P and Exam.  Otherwise ALL are  negative.    PHYSICAL EXAM:  All vitals have been reviewed    Blood pressure (!) 142/69, pulse 51, temperature 98  F (36.7  C), temperature source Oral, resp. rate 18, height 1.6 m (5' 3\"), weight 122.2 kg (269 lb 6.4 oz), SpO2 96 %.    No intake/output data recorded.    GENERAL APPEARANCE: healthy, alert and no distress  EYES: conjunctiva clear, eyes grossly normal  HENT: external ears and nose normal   RESP: lungs clear to auscultation - no rales, rhonchi or wheezes  CV: regular rate and rhythm, normal S1 S2, no S3 or S4 and no murmur, click or rub   ABDOMEN: soft, nontender, no HSM or masses and bowel sounds normal  MS: no clubbing, cyanosis; no edema  SKIN: LLE erythema lower 1/3 leg circumferential  NEURO: -non-focal moves all 4 extr    ROUTINE  LABS (Last four results)  CMP  Recent Labs   Lab 09/09/22  0457 09/08/22  1342    139   POTASSIUM 3.7 4.3   CHLORIDE 102 101   CO2 27 25   ANIONGAP 7 13   GLC 97 106*   BUN 11.2 13.1   CR 0.75 0.72   GFRESTIMATED >90 >90   KENNY 8.4* 8.9   PROTTOTAL  --  6.8   ALBUMIN  --  3.9   BILITOTAL  --  0.4   ALKPHOS  --  85   AST  --  23   ALT  --  13     CBC  Recent Labs   Lab 09/09/22  0457 09/08/22  1342   WBC 11.8* 18.7*   RBC 4.17 4.97 "   HGB 12.4 15.0   HCT 37.7 45.0   MCV 90 91   MCH 29.7 30.2   MCHC 32.9 33.3   RDW 14.8 14.9    212     INRNo lab results found in last 7 days.  Arterial Blood GasNo lab results found in last 7 days.    No results found for this or any previous visit (from the past 24 hour(s)).

## 2022-09-09 NOTE — H&P
[facility]    History and Physical - Hospitalist Service       Date of Admission:  9/8/2022    Assessment & Plan      Riana Cui is a 61 year old female admitted on 9/8/2022. She has history of depression and anxiety, varicose veins and obesity.  She presents due to recurrent cellulitis in her left leg.    Cellulitis of the left lower extremity, recurrent  Erythema, warmth, tenderness of the left lower extremity.  Associated fever, chills, malaise.  Previously treated with 10-day course of cephalexin starting 7/31/2022 and 10-day course of Bactrim starting 8/19/2022.  Erythema fully resolved with both antibiotic regimens though recurred, initially few days after completing cephalexin and just recently after 10 days after completing Bactrim.  Labs notable for WBC up to 18.7.  Vitals showed temp to 100.8 otherwise stable.  CT of the leg did not show any concerning signs for deeper infection.  It is unclear why cellulitis is recurrent despite treatment. Ultrasound showed superficial thrombophlebitis as noted below, unclear if this may be contributing to ongoing infection.  -Antibiotic: IV cefazolin  -MRSA swab ordered  - follow CBC  -Monitor outlined area  -Consider compression  -Manage superficial thrombophlebitis as below  -Photographed on 9/8/2022, see H&P    Superficial thrombophlebitis of the left greater saphenous vein and right greater saphenous vein  Varicose veins  Duplex ultrasound of the lower extremities shows superficial thrombophlebitis of the left greater saphenous vein and right greater saphenous vein.  Reviewed up-to-date and may consider prophylactic versus therapeutic anticoagulation based on size, location and risk factors for VTE.  Given she has clot in the greater saphenous vein which appears to be greater than 5 cm and also has higher risk factor for VTE with history of varicose veins will opt to treat with anticoagulation, initially started on prophylactic  "though will increase to full dose given VTE risk factor.  Of note both her brother and her sister have history of blood clots.  She is not aware of any specific disorder/hypercoagulable disease.  Unclear if this may also contribute to recurrent cellulitis (i.e. edema).  -Rivaroxaban 15 mg twice daily for 21 days followed by 20 mg once daily for 3 months  -Recommend repeat ultrasound prior to completing 3 months of therapy  -consider discussing further with hematology  -Consider follow-up with vascular for varicosities    Depression and anxiety  Previously diagnosed.  -Continue home paroxetine    Obesity  Body mass index is 47.72 kg/m . . Contributes to overall morbidity and mortality.       Diet: Regular Diet Adult    DVT Prophylaxis: DOAC  Mon Catheter: Not present  Central Lines: None  Cardiac Monitoring: None  Code Status: Full Code , discussed on admission    Clinically Significant Risk Factors Present on Admission                    # Severe Obesity: Estimated body mass index is 47.72 kg/m  as calculated from the following:    Height as of this encounter: 1.6 m (5' 3\").    Weight as of this encounter: 122.2 kg (269 lb 6.4 oz).        Disposition Plan      Expected Discharge Date: 09/09/2022                The patient's care was discussed with the Attending Physician, Dr. Lalit Grimaldo, Bedside Nurse and Patient.    Jayleen Woodruff PA-C  Hospitalist Service  [facility]  Securely message with the Vocera Web Console (learn more here)  Text page via [facility] Paging/Directory         ______________________________________________________________________    Chief Complaint   Recurrent cellulitis    History is obtained from the patient, review of chart    History of Present Illness   Riana Cui is a 61 year old female who presents with recurrent cellulitis.    On 7/31/2022 she was seen in the emergency department and diagnosed with cellulitis versus or horsefly bite.  She was treated " with 2 g of Ancef in the ED and then continued on cephalexin for 10 days.  She states that the erythema completely resolved though came back about 2 to 3 days after completing her therapy.     She presented again to the emergency department on 8/19/2022  due to concerns of recurrent cellulitis.  She was prescribed Bactrim at that time for 10 days.  She states her symptoms got better quite quickly and again the erythema completely resolved.    She completed her therapy about 10 days ago.  Today she woke with recurrent pain similar to the pain she has felt with previous episodes of cellulitis and then noted erythema starting at her ankle which is consistent with prior presentations.  She developed fever and chills, general malaise, poor intake and fatigue, which she has developed with each episode of cellulitis described above.  She presented to the emergency department for further evaluation.  She is found to have fever up to 100.8.    She has never had cellulitis prior to this.  She has not had any recurrent bug bites that she is aware of.  She has not noted any draining or fluctuance with this.    She has been concerned about a DVT on past presentations though no formal imaging had been done prior to today.  She has never had a blood clot in the past though her brother and sister have both had blood clots.  She is unaware of any specific inherited disease that may be the cause of this.      Review of Systems    The 10 point Review of Systems is negative other than noted in the HPI or here.     Past Medical History    I have reviewed this patient's medical history and updated it with pertinent information if needed.   Past Medical History:   Diagnosis Date     Anaphylactic reaction 06/10/2015    to ABX, toungue swelling     Anxiety      Bladder disorder      Colitis 8/12/2014     Obesity      PONV (postoperative nausea and vomiting)        Past Surgical History   I have reviewed this patient's surgical history and  updated it with pertinent information if needed.  Past Surgical History:   Procedure Laterality Date     ABDOMEN SURGERY        SECTION       CHOLECYSTECTOMY       COLONOSCOPY W/ BIOPSIES N/A 6/3/2021    Procedure: Colonoscopy with polypectomies;  Surgeon: Cruzito Field MD;  Location: [facility];  Service: Gastroenterology     GASTRIC BYPASS       HC KNEE SCOPE,SINGLE MENISECTOMY Left 2015    Procedure: LEFT KNEE ARTHROSCOPY , PARTIAL MEDIAL AND LATERAL MENISECTOMY;  Surgeon: Gato Quiñones MD;  Location: [facility];  Service: Orthopedics     HERNIA REPAIR, UMBILICAL      X 3       Social History   I have reviewed this patient's social history and updated it with pertinent information if needed.  Social History     Tobacco Use     Smoking status: Current Every Day Smoker     Packs/day: 0.50     Years: 34.00     Pack years: 17.00     Types: Cigarettes, Cigarettes     Smokeless tobacco: Never Used     Tobacco comment: pt declined info   Substance Use Topics     Alcohol use: No     Drug use: No       Family History   I have reviewed this patient's family history and updated it with pertinent information if needed.  Family History   Problem Relation Age of Onset     Heart Disease Mother      Coronary Artery Disease Father      Cancer Brother      Coronary Artery Disease Brother      Cancer Brother        Prior to Admission Medications   Prior to Admission Medications   Prescriptions Last Dose Informant Patient Reported? Taking?   PARoxetine (PAXIL) 40 MG tablet 2022 at am Self No Yes   Sig: TAKE 1 TABLET(40 MG) BY MOUTH EVERY MORNING   acetaminophen (TYLENOL) 500 MG tablet Past Month at Unknown time Self Yes Yes   Sig: Take 1,000 mg by mouth every 6 hours as needed for mild pain      Facility-Administered Medications: None     Allergies   Allergies   Allergen Reactions     Ibuprofen Anaphylaxis     Shellfish-Derived Products Anaphylaxis     Rofecoxib      Other reaction(s):  Stomach pain, rash       Physical Exam   Vital Signs: Temp: (!) 100.6  F (38.1  C) (nurse notifed) Temp src: Oral BP: 129/63 Pulse: 73   Resp: 18 SpO2: 98 % O2 Device: None (Room air)    Weight: 269 lbs 6.43 oz    Constitutional: Laying down comfortably in bed, mildly ill-appearing, awake, alert, cooperative, no apparent distress, and appears stated age  ENT: Oropharynx is clear and moist  Respiratory: No increased work of breathing, good air exchange, clear to auscultation bilaterally, no crackles or wheezing  Cardiovascular: Regular rate and rhythm.  No murmur.  Bilateral lower extremity edema.  GI: Soft, nondistended, nontender  Genitounirinary: Deferred  Skin: Warm and dry.  Left lower extremity with erythema starting just above the ankle to the upper shin.  Warm, slight tenderness.  No fluctuance.  Photograph in the ED as below.        Musculoskeletal: Moving all 4 extremities appropriately.  Neurologic: Awake, alert, oriented.  Neuropsychiatric: Calm, pleasant, cooperative.  Appropriate thought process and content.  Short-term memory grossly intact.    Data   Data reviewed today: I reviewed all medications, new labs and imaging results over the last 24 hours. I personally reviewed no images or EKG's today.    Recent Labs   Lab 09/08/22  1342   WBC 18.7*   HGB 15.0   MCV 91         POTASSIUM 4.3   CHLORIDE 101   CO2 25   BUN 13.1   CR 0.72   ANIONGAP 13   KENNY 8.9   *   ALBUMIN 3.9   PROTTOTAL 6.8   BILITOTAL 0.4   ALKPHOS 85   ALT 13   AST 23     Recent Results (from the past 24 hour(s))   CT Tibia Fibula Lower Leg Left w Contrast    Narrative    CT TIBIA AND FIBULA LOWER LEG LEFT WITH CONTRAST, 9/8/2022 2:23 PM    TECHNIQUE: Helical CT images of the left calf were obtained with IV  contrast. Contrast dose: 90mL Isovue-370 dose reduction techniques  were used. Two-dimensional coronal and sagittal reformats performed.    HISTORY: Calf pain and cellulitis. Low-grade fever.    FINDINGS:  No  evidence of osteomyelitis. No fracture.    Mild circumferential subcutaneous cellulitis in the distal half of the  calf. No discrete fluid collection to suggest an abscess. No muscle  atrophy. Tiny popliteal cyst. Moderate degenerative changes in the  patellofemoral compartment of the knee.      Impression    IMPRESSION: No abscess or osteomyelitis.    Mild circumferential subcutaneous cellulitis in the distal half of the  calf.     TONA TOBAR MD         SYSTEM ID:  [id number]   US Lower Extremity Venous Duplex Left    Narrative    VENOUS ULTRASOUND LEFT LOWER EXTREMITY  9/8/2022 3:56 PM     HISTORY: Swelling, redness, pain.    COMPARISON: None.    TECHNIQUE: Color Doppler and spectral waveform analysis performed  throughout the deep veins of the left lower extremity.    FINDINGS: The left common femoral, femoral, and popliteal veins  demonstrate normal blood flow and augmentation. Posterior tibial and  peroneal veins are compressible. Thrombus is noted in the greater  saphenous vein. Thrombosed varicosities noted in the right proximal  thigh and proximal greater saphenous vein, though incompletely  visualized. Right common femoral vein is patent.      Impression    IMPRESSION:  1. Negative for DVT in the left lower extremity.  2. Superficial thrombophlebitis in the left greater saphenous vein.  3. Superficial thrombophlebitis in the right proximal greater  saphenous vein with thrombosed varicosities, though incompletely  evaluated in the right lower chest    ZAINAB COHEN MD         SYSTEM ID:  [id number]

## 2022-09-09 NOTE — PLAN OF CARE
Goal Outcome Evaluation:       Walking halls independently.  Afebrile, but reports hot sweats off and on.  Left leg marked and extends outside markings on back of the calf and patient had two small red spots by knee cap on left leg during am assessment.  Patient stated she thinks the leg is overall improved, but those findings were new.  She denies pain and has been walking halls.  Receiving antibiotics intermittently.  Independent with mobility.

## 2022-09-09 NOTE — UTILIZATION REVIEW
Concurrent stay review; Secondary Review Determination      Under the authority of the Utilization Management Committee, the utilization review process indicated a secondary review on the above patient. The review outcome is based on review of the medical records, discussions with staff, and applying clinical experience noted on the date of the review.      (x) Observation Status Appropriate - Concurrent stay review      RATIONALE FOR DETERMINATION:    61 year old female admitted on 9/8/2022. She has history of depression and anxiety, varicose veins and obesity.  She presents due to recurrent cellulitis in her left leg.    Vital signs notable for temperature of 100.8 on admit.      Labs notable for WBC count near 19 (improved to 12).  CRP is 72.  MRSA nasal swab neg    CT scan shows no deep infection of LE.  LE US showed no DVT    The patient is receiving IV Ancef    For now would continue observation status.  If the patient clinically worsens or requires more than 48 additional hours in the hospital consider transition to inpatient status.       Patient is clinically improving and there is no clear indication to change patient's status to inpatient. The severity of illness, intensity of service provided, expected LOS and risk for adverse outcome make the care appropriate for observation.      The information on this document is developed by the utilization review team in order for the business office to ensure compliance. This only denotes the appropriateness of proper admission status and does not reflect the quality of care rendered.   The definitions of Inpatient Status and Observation Status used in making the determination above are those provided in the CMS Coverage Manual, Chapter 1 and Chapter 6, section 70.4.      Sincerely,      Rancho Poe MD  Utilization Review   Physician Advisor   [facility].

## 2022-09-09 NOTE — PROGRESS NOTES
Patient is pleasant, alert and oriented x4. Patient had temp of 100.6 last evening. Has complaints of headache. Patient has scheduled IV ancef q8h. Cellulitis on LLE outlined with marker.

## 2022-09-10 LAB
BASOPHILS # BLD AUTO: 0.1 10E3/UL (ref 0–0.2)
BASOPHILS NFR BLD AUTO: 1 %
CRP SERPL-MCNC: 74.31 MG/L
EOSINOPHIL # BLD AUTO: 0.2 10E3/UL (ref 0–0.7)
EOSINOPHIL NFR BLD AUTO: 3 %
ERYTHROCYTE [DISTWIDTH] IN BLOOD BY AUTOMATED COUNT: 14.8 % (ref 10–15)
HCT VFR BLD AUTO: 37.7 % (ref 35–47)
HGB BLD-MCNC: 12.4 G/DL (ref 11.7–15.7)
IMM GRANULOCYTES # BLD: 0 10E3/UL
IMM GRANULOCYTES NFR BLD: 0 %
LYMPHOCYTES # BLD AUTO: 1.4 10E3/UL (ref 0.8–5.3)
LYMPHOCYTES NFR BLD AUTO: 21 %
MCH RBC QN AUTO: 29.7 PG (ref 26.5–33)
MCHC RBC AUTO-ENTMCNC: 32.9 G/DL (ref 31.5–36.5)
MCV RBC AUTO: 90 FL (ref 78–100)
MONOCYTES # BLD AUTO: 0.5 10E3/UL (ref 0–1.3)
MONOCYTES NFR BLD AUTO: 8 %
NEUTROPHILS # BLD AUTO: 4.4 10E3/UL (ref 1.6–8.3)
NEUTROPHILS NFR BLD AUTO: 67 %
NRBC # BLD AUTO: 0 10E3/UL
NRBC BLD AUTO-RTO: 0 /100
PLATELET # BLD AUTO: 180 10E3/UL (ref 150–450)
RBC # BLD AUTO: 4.17 10E6/UL (ref 3.8–5.2)
WBC # BLD AUTO: 6.5 10E3/UL (ref 4–11)

## 2022-09-10 PROCEDURE — 85025 COMPLETE CBC W/AUTO DIFF WBC: CPT | Performed by: INTERNAL MEDICINE

## 2022-09-10 PROCEDURE — 250N000013 HC RX MED GY IP 250 OP 250 PS 637: Performed by: PHYSICIAN ASSISTANT

## 2022-09-10 PROCEDURE — 36415 COLL VENOUS BLD VENIPUNCTURE: CPT | Performed by: INTERNAL MEDICINE

## 2022-09-10 PROCEDURE — 250N000011 HC RX IP 250 OP 636: Performed by: PHYSICIAN ASSISTANT

## 2022-09-10 PROCEDURE — 120N000001 HC R&B MED SURG/OB

## 2022-09-10 PROCEDURE — 99232 SBSQ HOSP IP/OBS MODERATE 35: CPT | Performed by: INTERNAL MEDICINE

## 2022-09-10 PROCEDURE — 86140 C-REACTIVE PROTEIN: CPT | Performed by: INTERNAL MEDICINE

## 2022-09-10 PROCEDURE — 96376 TX/PRO/DX INJ SAME DRUG ADON: CPT

## 2022-09-10 PROCEDURE — G0378 HOSPITAL OBSERVATION PER HR: HCPCS

## 2022-09-10 RX ADMIN — PAROXETINE HYDROCHLORIDE 40 MG: 20 TABLET, FILM COATED ORAL at 07:55

## 2022-09-10 RX ADMIN — CEFAZOLIN SODIUM 2 G: 2 INJECTION, SOLUTION INTRAVENOUS at 21:05

## 2022-09-10 RX ADMIN — RIVAROXABAN 15 MG: 15 TABLET, FILM COATED ORAL at 07:55

## 2022-09-10 RX ADMIN — CEFAZOLIN SODIUM 2 G: 2 INJECTION, SOLUTION INTRAVENOUS at 05:41

## 2022-09-10 RX ADMIN — MICONAZOLE NITRATE: 20 POWDER TOPICAL at 08:05

## 2022-09-10 RX ADMIN — RIVAROXABAN 15 MG: 15 TABLET, FILM COATED ORAL at 16:40

## 2022-09-10 RX ADMIN — CEFAZOLIN SODIUM 2 G: 2 INJECTION, SOLUTION INTRAVENOUS at 14:50

## 2022-09-10 ASSESSMENT — ACTIVITIES OF DAILY LIVING (ADL)
ADLS_ACUITY_SCORE: 20

## 2022-09-10 NOTE — PROGRESS NOTES
"     Patient is A & O X 4.  Patient up independently, steady gait.  Left anteriorly shin well within marked cellulitis area.  However new redden area behind knee marked and dated to measure. Knee cap 2 spots very lite pink in color and patient said \"There is hardly anything left of those 2 spots.\"  Dr. Soto did assess knee cap yesterday.  Patient can make needs known.              "

## 2022-09-10 NOTE — PLAN OF CARE
Goal Outcome Evaluation:    Afebrile, independent and walking halls.  Took shower.  Saline locked in between antibiotics.  Redness receding within markings.  Patient anticipating discharge tomorrow.

## 2022-09-10 NOTE — PROGRESS NOTES
[facility] Internal Medicine Progress Note     Date of Service (when I saw the patient): 09/10/2022    REASON FOR ADMISSION / INTERVAL HISTORY:  Riana Cui is a 61 year old female admitted on 9/8/2022. She has history of depression and anxiety, varicose veins and obesity.  She presents due to recurrent cellulitis in her left leg    ASSESSMENT/PLAN:     Cellulitis of the left lower extremity, recurrent  Erythema, warmth, tenderness of the left lower extremity.  Associated fever, chills, malaise.  Previously treated with 10-day course of cephalexin starting 7/31/2022 and 10-day course of Bactrim starting 8/19/2022.  Erythema fully resolved with both antibiotic regimens though recurred, initially few days after completing cephalexin and just recently after 10 days after completing Bactrim.  Labs notable for WBC up to 18.7.  Vitals showed temp to 100.8 otherwise stable.  CT of the leg did not show any concerning signs for deeper infection.  It is unclear why cellulitis is recurrent despite treatment. Ultrasound showed superficial thrombophlebitis as noted below, unclear if this may be contributing to ongoing infection.  MRSA negative. Wbc improving. CRP still high. Erythema improving  -continue ancef     Superficial thrombophlebitis of the left greater saphenous vein and right greater saphenous vein  Varicose veins  Duplex ultrasound of the lower extremities shows superficial thrombophlebitis of the left greater saphenous vein and right greater saphenous vein.  Reviewed up-to-date and may consider prophylactic versus therapeutic anticoagulation based on size, location and risk factors for VTE.  Given she has clot in the greater saphenous vein which appears to be greater than 5 cm and also has higher risk factor for VTE with history of varicose veins will opt to treat with anticoagulation, initially started on prophylactic though will increase to full dose given VTE risk factor.  Of note both her brother and  "her sister have history of blood clots.  She is not aware of any specific disorder/hypercoagulable disease.  Unclear if this may also contribute to recurrent cellulitis (i.e. edema).  -Rivaroxaban 15 mg twice daily for 21 days followed by 20 mg once daily for 3 months  -Recommend repeat ultrasound prior to completing 3 months of therapy  -consider discussing further with hematology  -Consider follow-up with vascular for varicosities     Depression and anxiety  Previously diagnosed.  -Continue home paroxetine     Obesity  Body mass index is 47.72 kg/m . . Contributes to overall morbidity and mortality.          ADRIANE PAYTON MD   Pg [phone]    DVT Prhylaxis: DOAC  Code Status: Full Code    ROS:  As described in A/P and Exam.  Otherwise ALL are  negative.    PHYSICAL EXAM:  All vitals have been reviewed    Blood pressure 118/56, pulse 50, temperature 98.1  F (36.7  C), temperature source Oral, resp. rate 16, height 1.6 m (5' 3\"), weight 122.2 kg (269 lb 6.4 oz), SpO2 95 %.    No intake/output data recorded.    GENERAL APPEARANCE: healthy, alert and no distress  EYES: conjunctiva clear, eyes grossly normal  HENT: external ears and nose normal   RESP: lungs clear to auscultation - no rales, rhonchi or wheezes  CV: regular rate and rhythm, normal S1 S2, no S3 or S4 and no murmur, click or rub   ABDOMEN: soft, nontender, no HSM or masses and bowel sounds normal  MS: no clubbing, cyanosis; no edema  SKIN: LLE erythema lower 1/3 leg circumferential-improved since yesterday  NEURO: -non-focal moves all 4 extr    ROUTINE  LABS (Last four results)  CMP  Recent Labs   Lab 09/09/22 0457 09/08/22  1342    139   POTASSIUM 3.7 4.3   CHLORIDE 102 101   CO2 27 25   ANIONGAP 7 13   GLC 97 106*   BUN 11.2 13.1   CR 0.75 0.72   GFRESTIMATED >90 >90   KENNY 8.4* 8.9   PROTTOTAL  --  6.8   ALBUMIN  --  3.9   BILITOTAL  --  0.4   ALKPHOS  --  85   AST  --  23   ALT  --  13     CBC  Recent Labs   Lab 09/10/22  0422 09/09/22  0457 " 09/08/22  1342   WBC 6.5 11.8* 18.7*   RBC 4.17 4.17 4.97   HGB 12.4 12.4 15.0   HCT 37.7 37.7 45.0   MCV 90 90 91   MCH 29.7 29.7 30.2   MCHC 32.9 32.9 33.3   RDW 14.8 14.8 14.9    173 212     INRNo lab results found in last 7 days.  Arterial Blood GasNo lab results found in last 7 days.    No results found for this or any previous visit (from the past 24 hour(s)).

## 2022-09-10 NOTE — PLAN OF CARE
Patient is alert and oriented. Her  came to visit this evening. She went out to smoke during shift as she was starting to get headaches with the lack of smoking. Patient signed a form to allow her to go outside, which is placed in her chart. She is independent, vitally stable. The rash and swelling on her leg is decreasing within the markings. Still warm to the tough. She noticed that there is more of the rash starting to develop on her knee cap.

## 2022-09-11 VITALS
SYSTOLIC BLOOD PRESSURE: 132 MMHG | RESPIRATION RATE: 18 BRPM | HEIGHT: 63 IN | DIASTOLIC BLOOD PRESSURE: 53 MMHG | HEART RATE: 50 BPM | WEIGHT: 269.4 LBS | OXYGEN SATURATION: 97 % | TEMPERATURE: 97.3 F | BODY MASS INDEX: 47.73 KG/M2

## 2022-09-11 PROCEDURE — 250N000011 HC RX IP 250 OP 636: Performed by: PHYSICIAN ASSISTANT

## 2022-09-11 PROCEDURE — 250N000013 HC RX MED GY IP 250 OP 250 PS 637: Performed by: PHYSICIAN ASSISTANT

## 2022-09-11 PROCEDURE — 99239 HOSP IP/OBS DSCHRG MGMT >30: CPT | Performed by: INTERNAL MEDICINE

## 2022-09-11 RX ORDER — CEPHALEXIN 500 MG/1
500 CAPSULE ORAL 4 TIMES DAILY
Qty: 28 CAPSULE | Refills: 0 | Status: SHIPPED | OUTPATIENT
Start: 2022-09-11 | End: 2022-09-18

## 2022-09-11 RX ADMIN — PAROXETINE HYDROCHLORIDE 40 MG: 20 TABLET, FILM COATED ORAL at 08:21

## 2022-09-11 RX ADMIN — RIVAROXABAN 15 MG: 15 TABLET, FILM COATED ORAL at 08:21

## 2022-09-11 RX ADMIN — CEFAZOLIN SODIUM 2 G: 2 INJECTION, SOLUTION INTRAVENOUS at 05:22

## 2022-09-11 ASSESSMENT — ACTIVITIES OF DAILY LIVING (ADL)
ADLS_ACUITY_SCORE: 20

## 2022-09-11 NOTE — PLAN OF CARE
"Pt A&Ox4. Pt denies pain. LLE redness staying within marked lines, still slightly warm. No new skin concerns. Pt slept well. Independent with cares/ambulation. PIV SL, continues IV ABX. VSS, room air, afebrile, soft B/P and heart rate. Plan to discharge home 9/11.     Blood pressure 127/55, pulse 52, temperature 98  F (36.7  C), temperature source Oral, resp. rate 18, height 1.6 m (5' 3\"), weight 122.2 kg (269 lb 6.4 oz), SpO2 97 %.      "

## 2022-09-11 NOTE — DISCHARGE INSTRUCTIONS
Rivaroxaban (By mouth)  Rivaroxaban ([id number]-[id number])    Treats and prevents blood clots, which lowers the risk of stroke, deep vein thrombosis (DVT), pulmonary embolism (PE), heart attack, and similar conditions. Also prevent blood clots in people who are hospitalized for an acute illness. This medicine is a blood thinner.    Brand Name(s):Xarelto,Xarelto Starter Pack  There may be other brand names for this medicine.    When This Medicine Should Not Be Used:  This medicine is not right for everyone. Do not use it if you had an allergic reaction to rivaroxaban, or if you have severe bleeding.    How to Use This Medicine:  Tablet  Your doctor will tell you how much medicine to use. Do not use more than directed. Take this medicine at the same time each day.  2.5 milligram (mg) or 10 mg tablet: Take with or without food.  15 mg or 20 mg tablet: Take with food.  If you cannot swallow the tablets whole, you may crush the tablet and mix it with a small amount of applesauce. Eat some food right after you swallow the mixture.  Tube feeding: You may crush the tablet and mix the medicine in 50 milliliters (mL) of water before giving it via the tube. This must be followed by a feeding.  This medicine should come with a Medication Guide. Ask your pharmacist for a copy if you do not have one.   Missed dose:   Ask your doctor or pharmacist if you are not sure what to do if you miss a dose.  Once-daily dose: If you miss a dose or forget to use your medicine, use it as soon as you can on the same day. Do not use extra medicine to make up for a missed dose.  Twice-daily dose to prevent serious heart or blood vessel problems (2.5 mg tablet): If you miss a dose, skip the missed dose and take your next dose at your usual time.  Twice-daily dose to treat a blood clot (15 mg tablet): If you miss a dose or forget to use your medicine, use it as soon as you can on the same day. You may take 2 doses at the same time to make up for the  missed dose. This is only for people who take a total of 30 mg per day.  Store the medicine in a closed container at room temperature, away from heat, moisture, and direct light. You may store the crushed tablet with applesauce or water mixture for up to 4 hours.    Drugs and Foods to Avoid:  Ask your doctor or pharmacist before using any other medicine, including over-the-counter medicines, vitamins, and herbal products.  Some medicines can affect how rivaroxaban works. Tell your doctor if you are using any of the following:  Carbamazepine, conivaptan, erythromycin, indinavir, itraconazole, ketoconazole, lopinavir, phenytoin, rifampin, ritonavir, Amc's wort  Another blood thinner (including clopidogrel, enoxaparin, heparin, warfarin)  NSAID pain or arthritis medicine (including aspirin, celecoxib, diclofenac, ibuprofen, naproxen)    Warnings While Using This Medicine:  Tell your doctor if you are pregnant or breastfeeding, or if you have kidney disease, liver disease, bleeding problems, cancer, lung problems, an artificial heart valve, or antiphospholipid syndrome.  This medicine may increase your risk of bleeding. Be careful to avoid injuries that could cause bleeding. Stay away from rough sports or other situations where you could be bruised, cut, or hurt. Brush and floss your teeth gently. Be careful when using sharp objects, including razors and fingernail clippers. Avoid picking your nose. If you need to blow your nose, blow it gently.  This medicine may cause nerve damage if you have a medical procedure done to your back, including anesthesia or a spinal puncture. This is more likely to happen if you have a history of back injury, back surgery, problems with your spine, or procedures or punctures to your back. Tell your doctor if you are also taking another blood thinner, because this also increases the risk.  Do not stop using this medicine suddenly without asking your doctor. You might have an increased  risk for stroke for a short time after you stop using this medicine.  Tell any doctor or dentist who treats you that you are using this medicine.  Your doctor will do lab tests at regular visits to check on the effects of this medicine. Keep all appointments.   Keep all medicine out of the reach of children. Never share your medicine with anyone.     Possible Side Effects While Using This Medicine:  Call your doctor right away if you notice any of these side effects:  Allergic reaction: Itching or hives, swelling in your face or hands, swelling or tingling in your mouth or throat, chest tightness, trouble breathing  Blistering, peeling, red skin rash  Decrease in how much or how often you urinate  Heavy menstrual bleeding or vaginal bleeding  Red or brown urine, bloody or black, tarry stools  Unusual bleeding or bruising, including frequent nosebleeds  Vomiting of blood or material that looks like coffee grounds    If you notice other side effects that you think are caused by this medicine, tell your doctor.  Call your doctor for medical advice about side effects. You may report side effects to FDA at 9-818-FDA-8268

## 2022-09-11 NOTE — PROGRESS NOTES
[facility] DISCHARGE NOTE    Patient discharged to home at 12:44 pM via wheel chair. Accompanied by other: drove self. Discharge instructions reviewed with patient, opportunity offered to ask questions. Prescriptions filled and sent with patient upon discharge. All belongings sent with patient.  Xarelto clarified by pharmacist, MD updated by pharmacy and patient updated by RN and pharmacist.    Smiley Dee RN

## 2022-09-11 NOTE — DISCHARGE SUMMARY
[facility] Discharge Summary    Riana Cui MRN# [id number]   Age: 61 year old YOB: 1960     Date of Admission:  9/8/2022  Date of Discharge::  9/11/2022  Admitting Physician:  Noris Bardales MD  Discharge Physician:  Noris Bardales MD  Primary Physician: Roxy Hagen  Transferring Facility: N/A     Home clinic: unknown          Admission Diagnoses:   Cellulitis of left lower extremity [L03.116]  Thrombophlebitis of superficial veins of left lower extremity [I80.02]          Discharge Diagnosis:     Principle diagnosis: Cellulitis of left lower extremity   Thrombophlebitis of superficial veins of left lower extremity   Secondary diagnoses:  Patient Active Problem List   Diagnosis     Anxiety     Post-gastric Bypass For Obesity     Morbid obesity (H)     Tobacco use     Angioedema     Varicose veins     Symptomatic varicose veins of both lower extremities     Depression     GERD (gastroesophageal reflux disease)     Glaucoma     Hypercholesteremia     ANDRIY (obstructive sleep apnea)     Personal history of colonic polyps     Other affections of shoulder region, not elsewhere classified     Cellulitis of left lower extremity     Thrombophlebitis of superficial veins of left lower extremity          Brief History of Presenting Illness:   As per admit hx  Riana Cui is a 61 year old female who presents with recurrent cellulitis.     On 7/31/2022 she was seen in the emergency department and diagnosed with cellulitis versus or horsefly bite.  She was treated with 2 g of Ancef in the ED and then continued on cephalexin for 10 days.  She states that the erythema completely resolved though came back about 2 to 3 days after completing her therapy.      She presented again to the emergency department on 8/19/2022  due to concerns of recurrent cellulitis.  She was prescribed Bactrim at that time for 10 days.  She states her symptoms got better quite quickly and again the erythema completely  resolved.     She completed her therapy about 10 days ago.  Today she woke with recurrent pain similar to the pain she has felt with previous episodes of cellulitis and then noted erythema starting at her ankle which is consistent with prior presentations.  She developed fever and chills, general malaise, poor intake and fatigue, which she has developed with each episode of cellulitis described above.  She presented to the emergency department for further evaluation.  She is found to have fever up to 100.8.     She has never had cellulitis prior to this.  She has not had any recurrent bug bites that she is aware of.  She has not noted any draining or fluctuance with this.     She has been concerned about a DVT on past presentations though no formal imaging had been done prior to today.  She has never had a blood clot in the past though her brother and sister have both had blood clots.  She is unaware of any specific inherited disease that may be the cause of this.               Hospital Course:   Cellulitis of the left lower extremity, recurrent  Erythema, warmth, tenderness of the left lower extremity.  Associated fever, chills, malaise.  Previously treated with 10-day course of cephalexin starting 7/31/2022 and 10-day course of Bactrim starting 8/19/2022.  Erythema fully resolved with both antibiotic regimens though recurred, initially few days after completing cephalexin and just recently after 10 days after completing Bactrim.  Labs notable for WBC up to 18.7.  Vitals showed temp to 100.8 otherwise stable.  CT of the leg did not show any concerning signs for deeper infection.  It is unclear why cellulitis is recurrent despite treatment. Ultrasound showed superficial thrombophlebitis as noted below, unclear if this may be contributing to ongoing infection.  MRSA negative. Wbc normalized. Afebrile all along. Erythema resolved  Was on ancef in hospital x 3 days.  Will discharge on keflex x 7 days     Superficial  thrombophlebitis of the left greater saphenous vein and right greater saphenous vein  Varicose veins  Duplex ultrasound of the lower extremities shows superficial thrombophlebitis of the left greater saphenous vein and right greater saphenous vein.  Reviewed up-to-date and may consider prophylactic versus therapeutic anticoagulation based on size, location and risk factors for VTE.  Given she has clot in the greater saphenous vein which appears to be greater than 5 cm and also has higher risk factor for VTE with history of varicose veins will opt to treat with anticoagulation, initially started on prophylactic though will increase to full dose given VTE risk factor.  Of note both her brother and her sister have history of blood clots.  She is not aware of any specific disorder/hypercoagulable disease.  Unclear if this may also contribute to recurrent cellulitis (i.e. edema).  -Rivaroxaban 15 mg twice daily for 21 days followed by 20 mg once daily for 3 months  -Recommend repeat ultrasound prior to completing 3 months of therapy  -consider discussing further with hematology  -Consider follow-up with vascular for varicosities     Depression and anxiety  Previously diagnosed.  -Continue home paroxetine    Obesity  Body mass index is 47.72 kg/m . . Contributes to overall morbidity and mortality.                Procedures:   No procedures performed during this admission         Allergies:      Allergies   Allergen Reactions     Ibuprofen Anaphylaxis     Shellfish-Derived Products Anaphylaxis     Rofecoxib      Other reaction(s): Stomach pain, rash             Medications Prior to Admission:     Medications Prior to Admission   Medication Sig Dispense Refill Last Dose     acetaminophen (TYLENOL) 500 MG tablet Take 1,000 mg by mouth every 6 hours as needed for mild pain   Past Month at Unknown time     PARoxetine (PAXIL) 40 MG tablet TAKE 1 TABLET(40 MG) BY MOUTH EVERY MORNING 90 tablet 2 9/8/2022 at am              "Discharge Medications:     Current Discharge Medication List      START taking these medications    Details   cephALEXin (KEFLEX) 500 MG capsule Take 1 capsule (500 mg) by mouth 4 times daily for 7 days  Qty: 28 capsule, Refills: 0    Associated Diagnoses: Cellulitis of left lower extremity      !! rivaroxaban ANTICOAGULANT (XARELTO) 15 MG TABS tablet Take 1 tablet (15 mg) by mouth 2 times daily (with meals) for 18 days  Qty: 36 tablet, Refills: 0    Associated Diagnoses: Thrombophlebitis of superficial veins of left lower extremity      !! rivaroxaban ANTICOAGULANT (XARELTO) 20 MG TABS tablet Take 1 tablet (20 mg) by mouth daily (with dinner) 15 mg twice daily with food for 3 weeks, then 20 mg once daily with food (NOTE: will need 42 15 mg tablets the first time this is filled)  Qty: 60 tablet, Refills: 1    Comments: Future refills by primary care physician or cardiologist  Associated Diagnoses: Thrombophlebitis of superficial veins of left lower extremity       !! - Potential duplicate medications found. Please discuss with provider.      CONTINUE these medications which have NOT CHANGED    Details   acetaminophen (TYLENOL) 500 MG tablet Take 1,000 mg by mouth every 6 hours as needed for mild pain      PARoxetine (PAXIL) 40 MG tablet TAKE 1 TABLET(40 MG) BY MOUTH EVERY MORNING  Qty: 90 tablet, Refills: 2    Associated Diagnoses: Anxiety state                   Consultations:   No consultations were requested during this admission            Discharge Exam:   Blood pressure 132/53, pulse 50, temperature 97.3  F (36.3  C), temperature source Oral, resp. rate 18, height 1.6 m (5' 3\"), weight 122.2 kg (269 lb 6.4 oz), SpO2 97 %.  GENERAL APPEARANCE: healthy, alert and no distress  EYES: conjunctiva clear, eyes grossly normal  HENT: external ears and nose normal   NECK: supple, no masses or adenopathy  RESP: lungs clear to auscultation - no rales, rhonchi or wheezes  CV: regular rate and rhythm, normal S1 S2, no S3 or " S4 and no murmur, click or rub   ABDOMEN: soft, nontender, no HSM or masses and bowel sounds normal  MS: no clubbing, cyanosis; no edema  SKIN: clear without significant rashes or lesions  NEURO: Normal strength and tone, sensory exam grossly normal, mentation intact and speech normal    Unresulted Labs Ordered in the Past 30 Days of this Admission     No orders found from 8/9/2022 to 9/9/2022.          No results found for this or any previous visit (from the past 24 hour(s)).         Pending Tests at Discharge:   None         Discharge Instructions and Follow-Up:     Discharge diet: Regular   Discharge activity: Activity as tolerated   Discharge follow-up: Follow up with primary care provider in 1-2 weeks           Discharge Disposition:     Discharged to home      Attestation:  I have reviewed today's vital signs, notes, medications, labs and imaging.    Time Spent on this Encounter   I, Noris Bardales MD, personally saw the patient today and spent greater than 30 minutes discharging this patient.    Noris Bardales MD

## 2022-09-26 ENCOUNTER — OFFICE VISIT (OUTPATIENT)
Dept: FAMILY MEDICINE | Facility: CLINIC | Age: 62
End: 2022-09-26
Payer: COMMERCIAL

## 2022-09-26 VITALS
BODY MASS INDEX: 47.45 KG/M2 | HEART RATE: 51 BPM | SYSTOLIC BLOOD PRESSURE: 130 MMHG | DIASTOLIC BLOOD PRESSURE: 65 MMHG | RESPIRATION RATE: 16 BRPM | WEIGHT: 267.8 LBS | HEIGHT: 63 IN

## 2022-09-26 DIAGNOSIS — I80.02 THROMBOPHLEBITIS OF SUPERFICIAL VEINS OF LEFT LOWER EXTREMITY: ICD-10-CM

## 2022-09-26 DIAGNOSIS — L03.116 CELLULITIS OF LEFT LOWER EXTREMITY: ICD-10-CM

## 2022-09-26 DIAGNOSIS — Z09 HOSPITAL DISCHARGE FOLLOW-UP: Primary | ICD-10-CM

## 2022-09-26 DIAGNOSIS — Z23 NEED FOR PROPHYLACTIC VACCINATION AND INOCULATION AGAINST INFLUENZA: ICD-10-CM

## 2022-09-26 PROCEDURE — 90682 RIV4 VACC RECOMBINANT DNA IM: CPT | Performed by: FAMILY MEDICINE

## 2022-09-26 PROCEDURE — 99213 OFFICE O/P EST LOW 20 MIN: CPT | Mod: 25 | Performed by: FAMILY MEDICINE

## 2022-09-26 PROCEDURE — 90750 HZV VACC RECOMBINANT IM: CPT | Performed by: FAMILY MEDICINE

## 2022-09-26 PROCEDURE — 90472 IMMUNIZATION ADMIN EACH ADD: CPT | Performed by: FAMILY MEDICINE

## 2022-09-26 PROCEDURE — 90471 IMMUNIZATION ADMIN: CPT | Performed by: FAMILY MEDICINE

## 2022-09-26 NOTE — PROGRESS NOTES
Assessment/ Plan     1. Hospital discharge follow-up  Riana presents for hospital follow-up.  She was hospitalized at the [facility] from 9/8 through 9/13 for cellulitis of her lower extremity that failed outpatient treatment.  She had previously been treated twice with oral antibiotics and had recurrence of symptoms.  She was therefore admitted for IV antibiotics.  On ultrasound she was also found to have diffuse superficial thrombosis.  She was given the option to start blood thinners and she decided to start them to hopefully decrease the severity of her symptoms.  She is overall improving and feeling better.  She is almost done with her oral antibiotics.  She had me fill out [facility] paperwork today for the days that she missed.  She will plan to follow-up with me in November towards the end of her 3 months on the Eliquis.  We will then get an ultrasound to assess if the superficial clots have resolved.  She will then make a decision if she wants to see vascular regarding her varicose veins as she has had these treated before.  In the meantime, we discussed decreasing risk for future blood clots including quitting smoking and working on weight loss.    2. Thrombophlebitis of superficial veins of left lower extremity  She has superficial thrombophlebitis of the left greater saphenous and right proximal greater saphenous.  There is no signs of DVT.  She is on the Eliquis for 3 months to try to improve this quickly.  We will do a follow-up ultrasound before the end of the 3 months.  - US Lower Extremity Venous Duplex Bilateral; Future    3. Cellulitis of left lower extremity  Improved with IV and now oral antibiotics.    4. Need for prophylactic vaccination and inoculation against influenza  - INFLUENZA QUAD, RECOMBINANT, P-FREE (RIV4) (FLUBLOK)      Subjective:      Riana Cui is a 61 year old female who presents for hospital follow-up.  She first noticed some redness of her left lower extremity on July  "31.  She was seen in urgent care and put on antibiotics, Keflex.  This did improve and resolved.  Within a few days her symptoms came back.  She was then seen again and put on Bactrim.  Again symptoms improved until she was off antibiotics.  This last time she was feeling a little bit feverish and overall not doing well so they decided to admit her.  She had an ultrasound that showed some superficial phlebitis bilaterally.  She quickly responded to IV antibiotics and then they transitioned her to oral antibiotics at discharge.  They gave her the option of starting Eliquis and she has been taking it now and will complete a 3-month course.    Relevant past medical, family, surgical, and social history reviewed with patient, unless noted in HPI, not pertinent for this visit.  Medications were discussed and reconciled.   Review of Systems   A 12 point comprehensive review of systems was negative except as noted.      Current Outpatient Medications   Medication Sig Dispense Refill     acetaminophen (TYLENOL) 500 MG tablet Take 1,000 mg by mouth every 6 hours as needed for mild pain       PARoxetine (PAXIL) 40 MG tablet TAKE 1 TABLET(40 MG) BY MOUTH EVERY MORNING 90 tablet 2     rivaroxaban ANTICOAGULANT (XARELTO) 15 MG TABS tablet Take 1 tablet (15 mg) by mouth 2 times daily (with meals) for 37 doses 37 tablet 0     [START ON 9/30/2022] rivaroxaban ANTICOAGULANT (XARELTO) 20 MG TABS tablet Take 1 tablet (20 mg) by mouth daily (with dinner) for 69 days Start taking 20 mg once daily with food after you finish taking 15 mg twice daily with food. Stop taking rivaroxaban after you finish your 20 mg dose on 12/7/2022. (Patient not taking: Reported on 9/26/2022) 69 tablet 0         Objective:     /65   Pulse 51   Resp 16   Ht 1.6 m (5' 3\")   Wt 121.5 kg (267 lb 12.8 oz)   LMP  (LMP Unknown)   BMI 47.44 kg/m      Body mass index is 47.44 kg/m .       General appearance: alert, appears stated age and " cooperative  Lungs: clear to auscultation bilaterally  Heart: regular rate and rhythm, S1, S2 normal, no murmur, click, rub or gallop  Extremities:  Slight edema, no erythema      No results found for this or any previous visit (from the past 168 hour(s)).       This note has been dictated using voice recognition software. Any grammatical or context distortions are unintentional and inherent to the software

## 2022-11-07 ENCOUNTER — TRANSFERRED RECORDS (OUTPATIENT)
Dept: HEALTH INFORMATION MANAGEMENT | Facility: CLINIC | Age: 62
End: 2022-11-07

## 2022-11-07 ENCOUNTER — ALLIED HEALTH/NURSE VISIT (OUTPATIENT)
Dept: FAMILY MEDICINE | Facility: CLINIC | Age: 62
End: 2022-11-07
Payer: COMMERCIAL

## 2022-11-07 DIAGNOSIS — Z23 ENCOUNTER FOR IMMUNIZATION: Primary | ICD-10-CM

## 2022-11-07 PROCEDURE — 90471 IMMUNIZATION ADMIN: CPT

## 2022-11-07 PROCEDURE — 91312 COVID-19,PF,PFIZER BOOSTER BIVALENT: CPT

## 2022-11-07 PROCEDURE — 90750 HZV VACC RECOMBINANT IM: CPT

## 2022-11-07 PROCEDURE — 0124A COVID-19,PF,PFIZER BOOSTER BIVALENT: CPT

## 2022-11-11 ENCOUNTER — HOSPITAL ENCOUNTER (OUTPATIENT)
Dept: ULTRASOUND IMAGING | Facility: HOSPITAL | Age: 62
Discharge: HOME | End: 2022-11-11
Attending: FAMILY MEDICINE | Admitting: FAMILY MEDICINE
Payer: COMMERCIAL

## 2022-11-11 DIAGNOSIS — I80.02 THROMBOPHLEBITIS OF SUPERFICIAL VEINS OF LEFT LOWER EXTREMITY: ICD-10-CM

## 2022-11-11 PROCEDURE — 93970 EXTREMITY STUDY: CPT

## 2022-11-21 ENCOUNTER — OFFICE VISIT (OUTPATIENT)
Dept: FAMILY MEDICINE | Facility: CLINIC | Age: 62
End: 2022-11-21
Payer: COMMERCIAL

## 2022-11-21 VITALS
TEMPERATURE: 98 F | OXYGEN SATURATION: 91 % | BODY MASS INDEX: 47.88 KG/M2 | DIASTOLIC BLOOD PRESSURE: 70 MMHG | WEIGHT: 270.2 LBS | SYSTOLIC BLOOD PRESSURE: 134 MMHG | RESPIRATION RATE: 16 BRPM | HEIGHT: 63 IN | HEART RATE: 44 BPM

## 2022-11-21 DIAGNOSIS — I80.02 THROMBOPHLEBITIS OF SUPERFICIAL VEINS OF LEFT LOWER EXTREMITY: ICD-10-CM

## 2022-11-21 PROCEDURE — 99213 OFFICE O/P EST LOW 20 MIN: CPT | Performed by: FAMILY MEDICINE

## 2022-11-21 ASSESSMENT — ANXIETY QUESTIONNAIRES
6. BECOMING EASILY ANNOYED OR IRRITABLE: NOT AT ALL
4. TROUBLE RELAXING: NOT AT ALL
GAD7 TOTAL SCORE: 3
3. WORRYING TOO MUCH ABOUT DIFFERENT THINGS: SEVERAL DAYS
7. FEELING AFRAID AS IF SOMETHING AWFUL MIGHT HAPPEN: NOT AT ALL
GAD7 TOTAL SCORE: 3
1. FEELING NERVOUS, ANXIOUS, OR ON EDGE: SEVERAL DAYS
GAD7 TOTAL SCORE: 3
2. NOT BEING ABLE TO STOP OR CONTROL WORRYING: SEVERAL DAYS
IF YOU CHECKED OFF ANY PROBLEMS ON THIS QUESTIONNAIRE, HOW DIFFICULT HAVE THESE PROBLEMS MADE IT FOR YOU TO DO YOUR WORK, TAKE CARE OF THINGS AT HOME, OR GET ALONG WITH OTHER PEOPLE: NOT DIFFICULT AT ALL
7. FEELING AFRAID AS IF SOMETHING AWFUL MIGHT HAPPEN: NOT AT ALL
8. IF YOU CHECKED OFF ANY PROBLEMS, HOW DIFFICULT HAVE THESE MADE IT FOR YOU TO DO YOUR WORK, TAKE CARE OF THINGS AT HOME, OR GET ALONG WITH OTHER PEOPLE?: NOT DIFFICULT AT ALL
5. BEING SO RESTLESS THAT IT IS HARD TO SIT STILL: NOT AT ALL

## 2022-11-21 ASSESSMENT — PATIENT HEALTH QUESTIONNAIRE - PHQ9
SUM OF ALL RESPONSES TO PHQ QUESTIONS 1-9: 1
SUM OF ALL RESPONSES TO PHQ QUESTIONS 1-9: 1
10. IF YOU CHECKED OFF ANY PROBLEMS, HOW DIFFICULT HAVE THESE PROBLEMS MADE IT FOR YOU TO DO YOUR WORK, TAKE CARE OF THINGS AT HOME, OR GET ALONG WITH OTHER PEOPLE: NOT DIFFICULT AT ALL

## 2022-11-21 NOTE — PROGRESS NOTES
Assessment/ Plan     1. Thrombophlebitis of superficial veins of left lower extremity  Riana is here for follow-up of superficial thrombophlebitis bilateral upper legs.  She is now been on the Xarelto for 3 months.  Her repeat ultrasound showed a little bit of improvement on the right but no improvement on the left.  She is symptomatically feeling better without any tenderness but she can feel some lumpiness.  Would recommend she stay on the blood thinner for now and follow-up with the vascular center for their opinion.  I discussed with her sometimes these clots become organized and chronic and I am not sure if they would recommend doing anything different at this point.  She has had previous procedures with Dr. Hobson, so she will follow-up with him.  - rivaroxaban ANTICOAGULANT (XARELTO) 20 MG TABS tablet; Take 1 tablet (20 mg) by mouth daily (with dinner)  Dispense: 90 tablet; Refill: 1  - Vascular Medicine Referral; Future      Subjective:      Riana Cui is a 61 year old female who presents for follow-up of thrombophlebitis.  About 3 months ago she developed red, tender, warm right upper thigh and was seen in urgent care.  Her ultrasound confirmed bilateral superficial clots without DVT.  Due to the size, she was started on blood thinners.  She states symptomatically she is not having any pain or tenderness anymore.  However, on follow-up ultrasound there was just minimal improvement on the right and no improvement on the left.  She does continue to smoke.  She said previous procedures done at the vascular center in her lower legs.    Relevant past medical, family, surgical, and social history reviewed with patient, unless noted in HPI, not pertinent for this visit.  Medications were discussed and reconciled.   Review of Systems   A 12 point comprehensive review of systems was negative except as noted.      Current Outpatient Medications   Medication Sig Dispense Refill     PARoxetine (PAXIL) 40 MG  "tablet TAKE 1 TABLET(40 MG) BY MOUTH EVERY MORNING 90 tablet 2     rivaroxaban ANTICOAGULANT (XARELTO) 20 MG TABS tablet Take 1 tablet (20 mg) by mouth daily (with dinner) 90 tablet 1     acetaminophen (TYLENOL) 500 MG tablet Take 1,000 mg by mouth every 6 hours as needed for mild pain           Objective:     /70   Pulse (!) 44   Temp 98  F (36.7  C)   Resp 16   Ht 1.6 m (5' 3\")   Wt 122.6 kg (270 lb 3.2 oz)   LMP  (LMP Unknown)   SpO2 91%   BMI 47.86 kg/m      Body mass index is 47.86 kg/m .       General appearance: alert, appears stated age and cooperative    Lungs: clear to auscultation bilaterally  Heart: regular rate and rhythm, S1, S2 normal, no murmur, click, rub or gallop    Extremities: extremities normal, atraumatic, no cyanosis or edema  No tenderness to palpation of the thighs bilaterally.         No results found for this or any previous visit (from the past 168 hour(s)).       This note has been dictated using voice recognition software. Any grammatical or context distortions are unintentional and inherent to the software  Answers for HPI/ROS submitted by the patient on 11/21/2022  If you checked off any problems, how difficult have these problems made it for you to do your work, take care of things at home, or get along with other people?: Not difficult at all  PHQ9 TOTAL SCORE: 1  AUGUSTO 7 TOTAL SCORE: 3  What is the reason for your visit today? : follow up  How many servings of fruits and vegetables do you eat daily?: 2-3  On average, how many sweetened beverages do you drink each day (Examples: soda, juice, sweet tea, etc.  Do NOT count diet or artificially sweetened beverages)?: 3  How many minutes a day do you exercise enough to make your heart beat faster?: 9 or less  How many days a week do you exercise enough to make your heart beat faster?: 3 or less  How many days per week do you miss taking your medication?: 1      "

## 2022-11-27 ENCOUNTER — HEALTH MAINTENANCE LETTER (OUTPATIENT)
Age: 62
End: 2022-11-27

## 2022-12-21 ENCOUNTER — OFFICE VISIT (OUTPATIENT)
Dept: VASCULAR SURGERY | Facility: CLINIC | Age: 62
End: 2022-12-21
Attending: FAMILY MEDICINE
Payer: COMMERCIAL

## 2022-12-21 ENCOUNTER — ANCILLARY PROCEDURE (OUTPATIENT)
Dept: VASCULAR ULTRASOUND | Facility: CLINIC | Age: 62
End: 2022-12-21
Attending: SPECIALIST
Payer: COMMERCIAL

## 2022-12-21 VITALS
RESPIRATION RATE: 12 BRPM | HEART RATE: 72 BPM | SYSTOLIC BLOOD PRESSURE: 118 MMHG | DIASTOLIC BLOOD PRESSURE: 78 MMHG | TEMPERATURE: 98 F

## 2022-12-21 DIAGNOSIS — M79.89 LEG SWELLING: ICD-10-CM

## 2022-12-21 DIAGNOSIS — I83.893 SYMPTOMATIC VARICOSE VEINS OF BOTH LOWER EXTREMITIES: ICD-10-CM

## 2022-12-21 DIAGNOSIS — I80.02 THROMBOPHLEBITIS OF SUPERFICIAL VEINS OF LEFT LOWER EXTREMITY: ICD-10-CM

## 2022-12-21 DIAGNOSIS — I83.893 SYMPTOMATIC VARICOSE VEINS OF BOTH LOWER EXTREMITIES: Primary | ICD-10-CM

## 2022-12-21 PROCEDURE — G0463 HOSPITAL OUTPT CLINIC VISIT: HCPCS | Mod: 25 | Performed by: SPECIALIST

## 2022-12-21 PROCEDURE — 99204 OFFICE O/P NEW MOD 45 MIN: CPT | Performed by: SPECIALIST

## 2022-12-21 PROCEDURE — 93970 EXTREMITY STUDY: CPT

## 2022-12-21 PROCEDURE — 93970 EXTREMITY STUDY: CPT | Mod: 26 | Performed by: SURGERY

## 2022-12-21 ASSESSMENT — PAIN SCALES - GENERAL: PAINLEVEL: MILD PAIN (3)

## 2022-12-21 NOTE — PATIENT INSTRUCTIONS
Riana     Thank you for entrusting your care with us at the [facility].     We are prescribing some compression stockings for you. I have included different suppliers that should help you get measured and fitting to ensure proper fitting socks. You should wear these stockings as much as you can. It is especially important to wear them with long periods of standing, sitting, long car rides or if you will be flying. Compression socks should get refilled every 4-6 months. They do not need to be worn at night while in bed. It is recommended to wear compression level of 20-30mmhg or higher from toes to knees.     We will perform an ultrasound today or prior to your appointment with us in 3 months time to evaluate the valves in your veins.         Varicose Veins      Varicose veins are swollen, enlarged veins most often found in the legs. They are usually blue or purple in color and may bulge, twist, and stand out under the skin.  Normally, veins return blood from the body to the heart. The leg veins have one-way valves that prevent blood from flowing backward in the vein. When the valves are weak or damaged, blood backs up in the veins. This may cause some of the veins to swell and bulge and become varicose veins.      Symptoms    Varicose veins may or may not cause symptoms. If symptoms do occur, they can include:    Legs that feel tired, achy, heavy, or itchy    Leg muscle cramps    Skin changes, such as discoloration, dryness, redness, or rash (in more severe cases, you may also have sores on the skin called venous leg ulcers)      Pain & Discomfort  Pain, itching, swelling, burning, leg heaviness or tiredness, skin discoloration. Symptoms typically worsen throughout the day and are partially relieved by elevation or wearing compression socks or stockings.    Sometimes, varicose veins clot and become painful, hot, hard and discolored. This is called phlebitis, an uncomfortable but  temporary condition that will get better on its own in 2-3 months. Clots associated with phlebitis are limited to surface veins, and not dangerous - unlike clots in the deep veins (deep vein thrombosis or DVT) that are dangerous because they can travel to the heart or lung and require prompt treatment with blood thinners.    Bleeding  A shower or minor trauma can cause a varicose vein to burst and bleed.    Risk Factors    There are a number of factors that increase the risk for varicose veins. These can include:    Being a woman    Being older    Sitting or standing for long periods    Being overweight    Being pregnant    Having a family history of varicose veins    Among other things, veins are responsible for bringing blood back to the heart, sometimes working against gravity. When you walk, muscles in your leg squeeze the veins and help blood flow back into the heart. In normal veins, a series of valves assist this process. With varicose veins and with a related condition called chronic venous insufficiency, poorly functioning valves allow the blood to pool in the lower leg and cause symptoms.     Treatment begins with simple self-help measures (see below). If these don t help, there are many procedures that can be done to shrink or remove varicose veins. Your healthcare provider can tell you more about these options, if needed.    Home care    Support or compression stockings will likely be prescribed. If so, be sure to wear them as directed. They may help improve blood flow.    Exercising helps strengthen your leg muscles and improve blood flow. To get the most benefit, choose exercises such as walking, swimming, or cycling. Also try to exercise for at least 30 minutes on most days.    Raising (elevating) your legs lets gravity help blood flow back to the heart. Sit or lie with your feet above heart level a few times throughout the day, or as directed.    Avoid long periods of sitting or standing. Change  positions often. Also, move your ankles, toes and knees often. This may also help improve blood flow.    If you are overweight, talk with your healthcare provider about setting up a weight-loss plan. Maintaining a healthy weight can help reduce the strain on your veins. It may also improve symptoms, such as swelling and aching.    If you have dryness and itching, ask your provider about special lotions that can be applied to the skin to help improve symptoms.    When to seek medical advice  Call your healthcare provider right away if any of these occur:    Sudden, severe leg swelling, pain, or redness    Symptoms worsen, or they don t improve with self-care    Bleeding from any affected veins    Ulcers form on the legs, ankles, or feet    Fever of 100.4 F (38 C) or higher, or as advised by your provider      Understanding Spider and Varicose Veins    Do you often hide your legs because of the way they look? You may have noticed tiny red or blue bursts (spider veins). Or maybe you have veins that bulge or look twisted (varicose veins). If so, there are treatments that can help    What are the symptoms?  Spider veins or varicose veins may never be a problem. But sometimes they can cause legs to ache or swell. Your legs may also feel heavy and tired, or like they re burning. These symptoms may be more severe at the end of the day. Prolonged sitting or standing can also make your symptoms worse.    Who gets spider and varicose veins?  Anyone can get spider or varicose veins. But vein problems tend to be hereditary (run in families). Other factors that can affect veins include:    Pregnancy, hormones, and birth control pills    A job where you stand or sit a lot    Extra weight or lack of exercise    Age                       What can be done?  Spider and varicose veins can affect the way you feel about yourself. Talk to your healthcare provider about your concerns. There are treatments that can ease symptoms and make  your legs look better.    Your treatment choices  Treatment may include self-care, sclerotherapy (injecting veins with a chemical), surgery, or newer nonsurgical minimally invasive therapies. Spider veins and some varicose veins can be treated with sclerotherapy. Large varicose veins can often be treated with newer minimally invasive procedures and, in rare cases, surgery may be needed.     Please bring your prescription to a home medical supply store. Here is a list of locations but not limited to.     [facility]  [facility]  [address] [place] [address]  Phone: [phone]  Fax: [phone] [facility]  [address] [place] [address]  Phone: [phone]  Fax: [phone]   [facility]  [address] [place] [address]  Phone: [phone]  Fax: [phone] [facility]  [address]  [address] [place] [address]  Phone: [phone]  Fax: [phone]   [facility]  [address] [place] [address]  Phone: [phone] [facility]   [address] ([address] [place] [address]  Phone: [phone]   [facility]  [address] [place] [address]      Phone: [phone]  Fax: [phone] [place]  [address] [place] [address]      Phone: [phone]  Fax: [phone]     [facility] [email or web address]  [address], [place], [place] [id number] [phone]    [facility]  [email or web address]  [address] [place] [address]  Phone: [phone] [address] [place] [address]  Phone: [phone]    [address] [place] [address]  Phone: [phone] [address], [place] [address]  Phone: [phone] [address], [place] [address]  Phone: [phone]    [facility] [email or web address]  [address] [place] [address]  Phone: [phone]    [address] [place] [address]  Phone: [phone] [address] [place] [address]  Phone: [phone]    [employer] for iPhone and Android  [email or web address]  [phone]            Reference: Smartwork and SVS- Dr Hari Bah: [email or web address]-[email or web address]-[email or web address]-veins#resource-185      If you have any questions or problems prior to us seeing you at your next scheduled visit please do not hesitate to call us at [phone].    Dr Armando Hobson MD & Griffin FERGUSON RN     Phlebitis    Phlebitis is soreness and swelling (inflammation) of a vein. This can occur in your arms, legs, or torso (trunk), as well as deeper inside your body. Phlebitis is usually not serious when it occurs close to the surface of the body. However, it can cause serious problems when it occurs in a vein deeper inside the body.             CAUSES   Phlebitis can be triggered by various things, including:     Reduced blood flow through your veins. This can happen with:    Bed rest over a long period.    Long-distance travel.    Injury.    Surgery.    Being overweight (obese) or pregnant.    Having an IV tube put in the vein and getting certain medicines through the vein.    Cancer and cancer treatment.    Use of illegal drugs taken through the vein.    Inflammatory diseases.    Inherited (genetic) diseases that increase the risk of blood clots.    Hormone therapy, such as birth control pills.    SIGNS AND SYMPTOMS     Red, tender, swollen, and painful area on your skin. Usually, the area will be long and narrow.    Firmness along the center of the affected area. This can indicate that a blood clot has formed.    Low-grade fever.    DIAGNOSIS   A health care provider can usually diagnose  phlebitis by examining the affected area and asking about your symptoms. To check for infection or blood clots, your health care provider may order blood tests or an ultrasound exam of the area. Blood tests and your family history may also indicate if you have an underlying genetic disease that causes blood clots. Occasionally, a piece of tissue is taken from the body (biopsy sample) if an unusual cause of phlebitis is suspected.    TREATMENT   Treatment will vary depending on the severity of the condition and the area of the body affected. Treatment may include:    Use of a warm compress or heating pad.    Use of compression stockings or bandages.    Anti-inflammatory medicines.    Removal of any IV tube that may be causing the problem.    Medicines that kill germs (antibiotics) if an infection is present.    Blood-thinning medicines if a blood clot is suspected or present.    In rare cases, surgery may be needed to remove damaged sections of vein.    HOME CARE INSTRUCTIONS     Only take over-the-counter or prescription medicines as directed by your health care provider. Take all medicines exactly as prescribed.    Raise (elevate) the affected area above the level of your heart as directed by your health care provider.    Apply a warm compress or heating pad to the affected area as directed by your health care provider. Do not sleep with the heating pad.    Use compression stockings or bandages as directed. These will speed healing and prevent the condition from coming back.    If you are on blood thinners:    Get follow-up blood tests as directed by your health care provider.    Check with your health care provider before using any new medicines.    Carry a medical alert card or wear your medical alert jewelry to show that you are on blood thinners.    For phlebitis in the legs:    Avoid prolonged standing or bed rest.    Keep your legs moving. Raise your legs when sitting or lying.    Do not smoke.    Women,  particularly those over the age of 35, should consider the risks and benefits of taking the contraceptive pill. This kind of hormone treatment can increase your risk for blood clots.    Follow up with your health care provider as directed.    SEEK MEDICAL CARE IF:     You have unusual bruising or any bleeding problems.    Your swelling or pain in the affected area is not improving.    You are on anti-inflammatory medicine, and you develop belly (abdominal) pain.    SEEK IMMEDIATE MEDICAL CARE IF:     You have a sudden onset of chest pain or difficulty breathing.    You have a fever or persistent symptoms for more than 2-3 days.    You have a fever and your symptoms suddenly get worse.    MAKE SURE YOU:    Understand these instructions.    Will watch your condition.    Will get help right away if you are not doing well or get worse.     This information is not intended to replace advice given to you by your health care provider. Make sure you discuss any questions you have with your health care provider.     Document Released: 12/12/2002 Document Revised: 10/08/2014 Document Reviewed: 08/25/2014  [facility]  Patient Information  2015 [facility].   Varicose Vein Pre-Procedure Instructions/Vein Ablation     You are scheduled for a varicose vein treatment on your legs. The following is some helpful information for you, in regards to your treatment.    **Important:  A  will be needed post procedure.  (Unable to use Taxi or Uber).     We will supply a thigh high compression stocking for you. Please bring your compression sock as we only have sizes medium to X-large.    Please be aware, it is not advised to fly within 3 weeks post procedure    Please wear comfortable clothing.  We recommend that you bring a change of under clothes; they may get stained by the cleansing solution.    Feel free to bring a personal music player or a CD to listen to during your procedure.    Take your routine medications as you normally  would with exception to blood thinners. Aspirin is ok to continue.    If you take Warfarin, Xarelto, or Eliquis this will need to be HELD prior to procedure according to primary care provider/doctor or cardiology who prescribes this medication.    Please notify us if you take this medication.    It is ok to eat prior to this procedure.    Please allow 1- 2 hours for your appointment.    For any questions regarding your procedure please call   [phone] to speak with the nurse.    If you would like a Good Darline Estimate for your upcoming service/procedure contact Cost of Care Estimates at [phone], advocates are available Monday through Friday 8am - 5pm.    Radiofrequency Ablation Codes  57328 for first vein in either leg  78857 for second vein    Please have the following information available:  1. Patient name and date of birth  2. Insurance company, plan name, ID and group numbers  3. Description of the service/procedure and the associated procedure code numbers, if available. If more than one (1) procedure code, indicate which will be the primary procedure code.   4. The facility where the service/procedure will be performed.  5. The name of the physician involved with the service/procedure.  6. Appointment date of service.  7. Telephone number to call with the information.    Radiofrequency Ablation (RFA) Treatment for Varicose Veins    Radiofrequency ablation (RFA) is a procedure to treat varicose veins. It uses heat created from radiofrequency (RF).    Varicose veins are swollen, enlarged veins. They happen most often in the legs. Varicose veins can develop when valves in your veins become damaged. This causes problems with blood flow. Over time, too much blood collects in your veins. The veins may bulge, twist, and stand out under your skin. They can also cause symptoms such as aching, cramping, or swelling in your legs.    During RFA treatment, RF heat is sent into your vein through a thin, flexible  tube (catheter). This closes off blood flow in the main problem vein.           With RFA treatment, a catheter that contains RF heat is used to seal off the main problem vein.      Getting ready for your treatment  Follow any instructions from your healthcare provider.    Tell your provider if you:    Are pregnant or think you may be pregnant    Are breastfeeding    Smoke or use alcohol on a regular basis    Have any allergies or intolerances to certain medicines. Explain what reaction you have had to these medicines in the past.    Tell your provider about any medicines you are taking. You may need to stop taking all or some of these before the test. This includes:    Medicines that can thin your blood or prevent clotting (anticoagulants)    All prescription medicines    Over-the-counter medicines such as aspirin or ibuprofen    Street drugs    Herbs, vitamins, and other supplements    Follow any directions you're given for not eating or drinking before the procedure.    The day of your treatment  The treatment takes 45 to 60 minutes. The entire treatment (including time to prepare and recover) takes about 1 to 3 hours. You can go home the same day. For the treatment:     You'll lie down on a hospital bed.    An imaging method, such as ultrasound, is used to guide the procedure.    The leg to be treated is injected with numbing medicine.    Once your leg is numb, a needle makes a small hole (puncture) in the vein to be treated.    The catheter with the RF heat source is inserted into your vein.    More numbing medicine may be injected around your vein.    Once the catheter is in the right position, it is then slowly drawn backward. As the catheter sends out heat, the vein is closed off.    In some cases, other side branch varicose veins may be removed or tied off through a few small cuts (incisions).    When the treatment is done, the catheter is removed. Pressure is applied to the insertion site to stop any  bleeding. An elastic compression stocking or a bandage may then be put on your leg.    Recovering at home  Once at home, follow all the instructions you've been given. Be sure to:    Take all medicines as directed    Care for the catheter insertion site as directed    Check for signs of infection at the catheter insertion site (see below)    Wear elastic stockings or bandages as directed    Keep your legs raised (elevated) as directed    Walk a few times a day    Avoid heavy exercise, lifting, and standing for long periods as advised    Avoid air travel, hot baths, saunas, or whirlpools as advised    Call your healthcare provider  Call your healthcare provider if you have any of the following:    Fever of 100.4 F (38 C) or higher, or as directed by your provider    Chest pain or trouble breathing    Signs of infection at the catheter insertion site. These include increased redness or swelling (inflammation), warmth, increasing pain, bleeding, or bad-smelling discharge.    Severe numbness or tingling in the treated leg    Severe pain or swelling in the treated leg       Follow-up  You'll have a follow-up visit with your healthcare provider within a week. An ultrasound will be done to check for problems, such as blood clots. Your provider will discuss further treatments with you, if needed.  Risks and possible complications   These include the following:    Bleeding    Infection    Blood clots    Damage to the nerves in the treated area    Irritation or burning of the skin over the treated vein    Treatment doesn't improve the look or the symptoms of the problem veins    Risks of any medicines used during the treatment      Date Last Reviewed: 5/1/2016 2000-2017 [employer]. [address] [place] [address], [place] [id number]. All rights reserved. This information is not intended as a substitute for professional medical care. Always follow your healthcare professional's instructions.    Self-Care for Spider and  Varicose Veins  Your healthcare provider may suggest that you try self-care. Exercising and maintaining a healthy weight may keep problem veins from getting worse. Wearing elastic stockings and elevating your legs can help improve blood flow. Taking breaks when you sit or stand helps, too.         The top of the elastic stocking should be below the bend in your knee for a proper fit.      Exercising  Exercising is good for your veins because it improves blood flow. Walking, cycling, or swimming are great exercises for vein health. But be sure to check with your healthcare provider before starting any exercise program. Also, keep these hints in mind:    When exercising, start out slowly and try to build up to 30 minutes on most days.    Elevate your legs above heart level after exercise to keep blood from pooling in veins.    Maintaining a healthy weight  Being overweight puts extra pressure on your veins. To maintain a healthy weight, try these tips:    Choose lean meats, fish, and skinless chicken.    Use low-fat dairy products.    Eat foods high in fiber, such as whole grains, fruits, and vegetables.    Cut down on sugar, salt, and saturated and trans fats.    Exercise regularly.    Wearing elastic stockings  Elastic stockings gently squeeze veins so blood flows upward. If you need elastic stockings, your healthcare provider can prescribe them for you. Follow your healthcare provider's advice about how and when to wear them. Elastic stockings come in several different levels of pressure. Ask your healthcare provider which level of pressure would benefit you the most.     Elevating your legs  Raising your legs above heart level will help relieve swelling and keep blood from pooling in veins. Try to elevate your legs for 15 to 20 minutes at the end of the day, and whenever you're relaxing. To make sure your legs are raised above heart level, prop them up on cushions or large pillows.    When sitting and standing  To  keep blood moving when you have to sit or stand for long periods, try these tips:    At work, take walking breaks instead of coffee breaks. Walk during your lunch hour. Or try flexing your feet up and down 10 times each hour.    When standing, raise yourself up and down on your toes, or rock back and forth on your heels.    Date Last Reviewed: 5/1/2016 2000-2017 [employer]. [address] [place] [address], [place] [address]. All rights reserved. This information is not intended as a substitute for professional medical care. Always follow your healthcare professional's instructions.    [employer] [facility] [facility]  [address]  [place] [address]    [place] [phone]  [phone]  [phone]  [place] [phone]  [email or web address]   [address]             [address]                 [address]     [place] [address]      [place] [address]         [place] [address]  [phone]  [phone]            [phone]                Fax: [phone]  [email or web address]  [address] [facility] [place] [address]  [phone]  [phone]  [email or web address]    Mick Ramos  [phone]  [email or web address]    [facility]   [phone]    [facility]  [address] [place] [address]  [phone]

## 2022-12-21 NOTE — PROGRESS NOTES
[facility] Vein Consult      Assessment:     1. varicose veins and spider veins, bilateral   2. Phlebitis superficial back and September started on Xarelto, right   3. Insuffiencey of right greater saphenous vein  4.  History of ablation of the left greater saphenous vein in the past      Plan:     1. Treatment options of conservative therapy of stockings use, exercise, weight loss, elevating legs when possible.    2. Script for compression stockings 20-30 mm hg  3. Ultrasound to evaluate legs for incompetency of both deep and superficial system .   4. Surgical treatment   Endovenous closure ofright, greater saphenous vein     Risks and benefits of surgical intervention including infection, burns, dvt, thrombophlebitis, not closing, recurrence, numbness and nerve injury and need for further intervention were all discused    5. Follow up: procedure..   6. Call for any questions concerns or issues    Subjective:      Riana Cui is a 62 year old female  who was referred by Roxy Hagen  for evaluation of varicose veins. Symptoms include pain, aching, fatigue, burning, edema, dermatitis, episodes of superficial thrombophlebitis and history of closure of the left greater saphenous vein in the past continued issues with more symptomatic issues on the right side upper thigh she was started on Xarelto in the emergency room secondary to superficial phlebitis back in September. Patient has history of leg selling, pain and vein issues that have progressed. Pain and symptoms have affected daily living and work activities needing medications. Here for evaluation today. Stocking use with compression stockings of 20-30 mm hg or greater for greater then 3 months    Allergies:Ibuprofen, Shellfish-derived products, and Rofecoxib    Past Medical History:   Diagnosis Date     Anaphylactic reaction 06/10/2015    to ABX, toungue swelling     Anxiety      Bladder disorder      Colitis 8/12/2014     Obesity      PONV  (postoperative nausea and vomiting)        Past Surgical History:   Procedure Laterality Date     ABDOMEN SURGERY        SECTION       CHOLECYSTECTOMY       COLONOSCOPY W/ BIOPSIES N/A 6/3/2021    Procedure: Colonoscopy with polypectomies;  Surgeon: Cruzito Field MD;  Location: [facility];  Service: Gastroenterology     GASTRIC BYPASS       HC KNEE SCOPE,SINGLE MENISECTOMY Left 2015    Procedure: LEFT KNEE ARTHROSCOPY , PARTIAL MEDIAL AND LATERAL MENISECTOMY;  Surgeon: Gato Quiñones MD;  Location: [facility];  Service: Orthopedics     HERNIA REPAIR, UMBILICAL      X 3         Current Outpatient Medications:      PARoxetine (PAXIL) 40 MG tablet, TAKE 1 TABLET(40 MG) BY MOUTH EVERY MORNING, Disp: 90 tablet, Rfl: 2     rivaroxaban ANTICOAGULANT (XARELTO) 20 MG TABS tablet, Take 1 tablet (20 mg) by mouth daily (with dinner), Disp: 90 tablet, Rfl: 1     acetaminophen (TYLENOL) 500 MG tablet, Take 1,000 mg by mouth every 6 hours as needed for mild pain, Disp: , Rfl:      Family History   Problem Relation Age of Onset     Heart Disease Mother      Coronary Artery Disease Father      Cancer Brother      Coronary Artery Disease Brother      Cancer Brother         reports that she has been smoking cigarettes and cigarettes. She has a 17.00 pack-year smoking history. She has never used smokeless tobacco. She reports that she does not drink alcohol and does not use drugs.      Review of Systems:    Pertinent items are noted in HPI.  Patient has symptomatic veins and changes of bilateral legs. These have progressed to the point of causing symptoms on a daily basis. This causes issues with daily activities and chores such as washing dishes, vacuuming, outdoor upkeep and standing for long lengths of time       Objective:     Vitals:    22 0929   BP: 118/78   Pulse: 72   Resp: 12   Temp: 98  F (36.7  C)     There is no height or weight on file to calculate BMI.    EXAM:  GENERAL: This  is a well-developed 62 year old female who appears her stated age  HEAD: normocephalic  HEENT: Pupils equal and reactive bilaterally  MOUTH: mucus membranes intact. Normal dentation  CARDIAC: RRR without murmur  CHEST/LUNG:  Clear to auscultation bilaterally  ABDOMEN: Soft, nontender, nondistended, no masses noted   NEUROLOGIC: Focally intact, nonfocal, alert and oriented x 3  INTEGUMENT: No open lesions or ulcers  VASCULAR: Pulses intact, symmetrical upper and lower extremities. There areskin changes consistent with chronic venous insufficiency. Varicose veins present in bilateral greater saphenous distribution. Spider veins present bilateral.                               Imaging:    Exam Information    Exam Date Exam Time Accession # Performing Department Results    12/21/22 10:58 AM [id number] [facility] [place]      PACS Images     Show images for US Venous Competency Bilateral     Study Result    Narrative & Impression   BILATERAL Venous Insufficiency Ultrasound (Date: 12/21/22)    BILATERAL Lower Extremity          Indication:  SURVEILLANCE BILATERAL LEG VARICOSE VEINS, PAIN, AND SWELLING. HX: RIGHT GREAT SAPHENOUS VEIN STRIPPING. LEFT GREAT SAPHENOUS VEIN THROMBUS.      Previous: 7/25/2017     Patient History: Swelling, Vein Stripping, Anticoagulants and Morbid Obesity     Presenting Symptoms:  Swelling, Varicose Veins and Pain     Technique:   Supine and Upright Ultrasound of the Deep and Superficial Veins with Valsalva and Compression Augmentation Maneuvers. Duplex Imaging is performed utilizing gray-scale, Two-dimensional images, color-flow imaging, Doppler waveform analysis, and Spectral doppler imaging done with provacative maneuvers.      Incompetency Criteria:  Deep vein reflux reported when greater than 1000 msec flow reversal. Superficial vein reflux reported when equal to or greater than 600 msec flow reversal.  vein reflux reported as greater  than 350 msec flow reversal.      Right  Leg Deep Veins    CFV SFJ PFV PROX FV   PROX FV MID FV DIST POP V. PERON.   V. PTV'S   Compressibility  (FC,PC,NC) FC FC FC FC FC FC FC FC FC   Reflux -   - - - - +   -         Right Leg Saphenous Veins  Location SFJ PROX THIGH KNEE MID CALF SPJ PROX CALF MID CALF   RT GSV (mm) Strip Strip Strip Strip         Reflux N/A  N/A  N/A N/A         RT SSV (mm)         2.3 2.9 2.9   Reflux         - - -      Location SFJ   RT AASV (mm) 7.4   Reflux +      Left Leg Deep Veins    CFV SFJ PFV PROX SFV PROX SFV MID SFV DIST POP V. PERON. V. PTV'S   Compressibility  (FC,PC,NC) FC FC FC FC FC FC FC FC FC   Reflux -   - - - - -   -         Lt Leg Saphenous Veins   Location SFJ PROX THIGH KNEE MID CALF SPJ PROX CALF MID CALF   LT GSV (mm) 10.5 7.4 8.6 7.7         Reflux + + + +         LT SSV (mm)         3.6 3.0 5.2   Reflux         - - -      Location SFJ   LT AASV (mm) 2.3   Reflux -      Comments: No incompetent  veins visualized.       RIGHT SSV MID CALF VARICOSE VEIN BRANCH INCOMPETENT (6.4 mm/ 2993 ms).        Impression:           Reference:     Compressibility: FC= Fully compressible, PC= Partially compressible, NC= Non-compressible, NV= Not Visualized     Reflux: (+) Incompetent  (-) Competent, (NV) = Not Visualized     Interpretation criteria:          Duration of Retrograde flow (milliseconds)  Category Deep Veins Superficial Veins  veins   Competent < 1000ms < 500ms < 350ms   Incompetent > 1000ms > 500ms > 350ms              Armando Hobson MD  General Surgery [phone]  Vascular Surgery [phone]

## 2022-12-21 NOTE — PROGRESS NOTES
[facility] Vascular Clinic        Patient is here for a VV/phlebitis right thigh follow up  to discuss Varicose vein(s). The patient has varicose veins that are problematic in right, left and bilateral legs. Symptoms patient has been experiencing are heaviness, aching,  itching, tiredness, cramps, discoloration and  swelling. Patient states their varicose veins are bothersome when phlebitis is worse,  working and household chores. Patient  has been wearing compression stockings. Gets compression on line knee highs. Patient  has been using pain medication or anti-inflammatory's. Patient  had recent imaging on legs done. In [facility] for phlebitis and started on Xarelto.  US done and review RFA option left GSV RFA. Ok to stop xeralto.  Pt is currently taking Xarelto.    /78   Pulse 72   Temp 98  F (36.7  C)   Resp 12   LMP  (LMP Unknown)     The provider has been notified that the patient phlebitis.       Refills are needed: No    Has homecare services and agency name:  Karen Hoover RN

## 2023-02-01 ENCOUNTER — TELEPHONE (OUTPATIENT)
Dept: VASCULAR SURGERY | Facility: CLINIC | Age: 63
End: 2023-02-01
Payer: COMMERCIAL

## 2023-02-01 NOTE — TELEPHONE ENCOUNTER
Called patient to review upcoming procedure.    Procedure: Radiofrequency ablation of left Greater Saphenous Vein    Surgeon: MD Armando Hobson    Date: 2/15/23    Procedure Location: [facility], [address] [place] [address], [address], [place], [place]- Phone: [phone], Fax [phone]    Procedure Time :  1330pm    Arrival Time: 1315pm    Reviewed allergies: Reviewed Allergies and if any adhesive allergies: No    Reviewed okay to eat and drink prior to radiofrequency ablation or Venaseal procedure with the exception of a blood thinner.     Reviewed Blood Thinners and plan for holding, continuing and/or bridging: Xarelto- Please HOLD 3 days before /Patient is no longer taking Xarelto.    Reviewed Post Procedure follow up plan and appts made: Yes    Reviewed if needing time off work, job position, estimated return to work. [facility] paperwork should be faxed to the provider's office to [phone]. N/A    Reviewed procedure with patients and instructions: Yes      Bring a  on the day of the procedure for radiofrequency ablation or if having stab phlebectomy.      Please arrive to clinic 15 minutes early prior to arrival time to use the restroom and sign consent.       Please wear a mask and wear loose-fitting comfortable clothing.       For in clinic procedure (Radiofrequency ablation or Endoseal procedure) bring your own thigh-high compression or compression shorts and your knee high stockings if you think you may not fit into our M-XL thigh-high compression socks. You will wear the thigh-high stockings for 2 nights continuously after the procedure, then wear thigh-high stockings for 2 weeks after but taking off at bedtime, and then you can transition into compression stockings of your choice. You should then continue to wear your compression as much as possible.       For stab phlebectomy you will be discharged with compression wraps from your toe to thigh and will wear  the wraps for 5-7 days. You will then transition into thigh high compression stockings for 1 month. You should then continue to wear your compression as much as possible.       You will not be able to fly for 3 weeks, no vigorous activity for 2 weeks, and no lifting over 20 lbs.        We have received approval from [facility] one. If your insurance has changed please notify us. Be aware this doesn't mean you are covered at 100%. Please check with insurance for coverage and your out of pocket costs.      All questions answered and number provided if any further questions arise or changes in patient status.

## 2023-02-15 ENCOUNTER — OFFICE VISIT (OUTPATIENT)
Dept: VASCULAR ULTRASOUND | Facility: CLINIC | Age: 63
End: 2023-02-15
Attending: SPECIALIST
Payer: COMMERCIAL

## 2023-02-15 VITALS — DIASTOLIC BLOOD PRESSURE: 70 MMHG | SYSTOLIC BLOOD PRESSURE: 130 MMHG | HEART RATE: 72 BPM | TEMPERATURE: 97.9 F

## 2023-02-15 DIAGNOSIS — I83.893 SYMPTOMATIC VARICOSE VEINS OF BOTH LOWER EXTREMITIES: ICD-10-CM

## 2023-02-15 DIAGNOSIS — M79.89 LEG SWELLING: ICD-10-CM

## 2023-02-15 DIAGNOSIS — I80.02 THROMBOPHLEBITIS OF SUPERFICIAL VEINS OF LEFT LOWER EXTREMITY: Primary | ICD-10-CM

## 2023-02-15 PROCEDURE — 36476 ENDOVENOUS RF VEIN ADD-ON: CPT | Mod: LT | Performed by: SPECIALIST

## 2023-02-15 PROCEDURE — 36476 ENDOVENOUS RF VEIN ADD-ON: CPT | Mod: LT

## 2023-02-15 PROCEDURE — 36475 ENDOVENOUS RF 1ST VEIN: CPT | Mod: LT

## 2023-02-15 PROCEDURE — 250N000009 HC RX 250: Performed by: SPECIALIST

## 2023-02-15 PROCEDURE — 250N000011 HC RX IP 250 OP 636: Performed by: SPECIALIST

## 2023-02-15 PROCEDURE — 258N000003 HC RX IP 258 OP 636: Performed by: SPECIALIST

## 2023-02-15 PROCEDURE — 36475 ENDOVENOUS RF 1ST VEIN: CPT | Mod: LT | Performed by: SPECIALIST

## 2023-02-15 PROCEDURE — C1886 CATHETER, ABLATION: HCPCS

## 2023-02-15 RX ORDER — LIDOCAINE HYDROCHLORIDE 20 MG/ML
5 INJECTION, SOLUTION INFILTRATION; PERINEURAL ONCE
Status: COMPLETED | OUTPATIENT
Start: 2023-02-15 | End: 2023-02-15

## 2023-02-15 RX ADMIN — LIDOCAINE HYDROCHLORIDE 5 ML: 20 INJECTION, SOLUTION INFILTRATION; PERINEURAL at 13:44

## 2023-02-15 RX ADMIN — LIDOCAINE HYDROCHLORIDE: 10 INJECTION, SOLUTION INFILTRATION; PERINEURAL at 13:44

## 2023-02-15 ASSESSMENT — PAIN SCALES - GENERAL
PAINLEVEL: MODERATE PAIN (4)
PAINLEVEL: NO PAIN (0)

## 2023-02-15 NOTE — PATIENT INSTRUCTIONS
Discharge Instructions Following Venous Closure  Today, you had this procedure done:   - Vein closure using RadioFrequency Ablation (RFA)    Dressing    Leakage from the numbing medications the first few hours is normal if you had an RFA.     Wear your thigh high compression for 48 hours continuously until your ultrasound after the procedure.  It is ok to remove your stocking to shower in 24 hours but then reapply after.    Wear the thigh high stocking for two weeks after the procedure while you are up. It is ok to take off when you sleep.     After two weeks, you can transition into compression stockings of your choice.     Activity    On the day of the procedure, make sure to walk around the house for a few minutes each hour until bedtime.    The day after the procedure you should advance activity as tolerated.  NO strenuous activities though for 2 weeks.  In general, avoid prolonged standing in place and sitting with your legs down.  Both activities will cause blood to pool in your legs resulting in more swelling and discomfort.    Do not drive or operate heavy machinery for 24 hours after the procedure (excludes if you had a VenaSeal).     Do not take baths or use hot tubs or swimming pools until your incision site is healed.    Do not fly for the next 3 weeks.    Do not lift > 20 lbs. for 2 weeks.     Post Procedure Ultrasound & Follow-up  You will need an ultrasound within 2-3 days following the closure procedure.  Then plan to see your surgeon in the office six weeks after your procedure. Call us to schedule this appointment if one has not already been made.  Post Procedure Signs and Symptoms  There can be a mild amount of bruising and numbness after this procedure.  This will typically take a few weeks to fade.  You may feel pain or tenderness in your inner thigh.  Mild pain medication such as Tylenol or Ibuprofen maybe helpful.  It is normal to have fluid leaking from the injection areas the day of the  procedure and it may be blood tinged.      Call your healthcare provider if you have any of the following:    Fever of 100.4 F (38 C) or higher, or as directed by your provider    Chest pain or trouble breathing    Signs of infection at the catheter insertion site. These include increased redness or swelling (inflammation), warmth, increasing pain, bleeding, or bad-smelling discharge.    Severe numbness or tingling in the treated leg    Severe pain or swelling in the treated leg  For emergencies after 4:30pm Monday - Friday, holidays and weekends:  Dr. Armando Hobson, [facility] Surgery at [phone]  Dr. Lu Hull, [facility] Vascular at [phone]  Thank you for allowing us to be part of your care

## 2023-02-15 NOTE — PROGRESS NOTES
VNUS Radiofrequency Ablation    Pre-Procedure:  Admit  [x]Consent for Radio Frequency Ablation of the   []Right Saphenous Vein and/or branches  [x]Left Saphenous Vein and/or branches  Vitals  [x]Vital signs per routine    Nursing Orders  [x]Vein Mapping of  []R extremity  [x]L extremity  [x]Sterile Prep to extremity  [x]Obtain Tumescent Solution 500mL (NS 1000mL, 1% Lidocaine w Epi 56mL, 8.4% Sodium Bicarbonate 5.6mL)    Medications  []Diazepam (Valium) 5mg PO, May Repeat x 1 for anxiety, relaxtion.    Post-Procedure:  Admission  [x]Post Procedure care - call physician for status update  []Admit to inpatient - Please document reason for admission in chart    Interventions require inpatient services    High risk of deterioration due to comorbidities    High risk of deterioration due to nature of admitting diagnosis  Location:    Vitals  [x]Vital signs per routine    Nursing Orders  [x]Thigh High Compression Stocking 20-30mm or 30-40mm to  []R extremity  [x]L extremity  [x]Check insertion site for bleeding or hematoma.  If bleeding or hematoma occurs, apply pressure and notify MD    Discharge  [x]Discharge home if no complications arise.  [x]Give post radiofrequency ablation discharge instructions to patient.  [x]Follow up venous ultrasound 72-96 hours after procedure.  [x]Follow up appointment with MD at 2 weeks.  [x]Contact MD for further questions

## 2023-02-16 ENCOUNTER — ANCILLARY PROCEDURE (OUTPATIENT)
Dept: VASCULAR ULTRASOUND | Facility: CLINIC | Age: 63
End: 2023-02-16
Attending: SPECIALIST
Payer: COMMERCIAL

## 2023-02-16 DIAGNOSIS — M79.89 LEG SWELLING: ICD-10-CM

## 2023-02-16 DIAGNOSIS — I80.02 THROMBOPHLEBITIS OF SUPERFICIAL VEINS OF LEFT LOWER EXTREMITY: ICD-10-CM

## 2023-02-16 DIAGNOSIS — I83.893 SYMPTOMATIC VARICOSE VEINS OF BOTH LOWER EXTREMITIES: ICD-10-CM

## 2023-02-16 PROCEDURE — 93971 EXTREMITY STUDY: CPT | Mod: 26 | Performed by: SURGERY

## 2023-02-16 PROCEDURE — 93971 EXTREMITY STUDY: CPT | Mod: LT

## 2023-03-28 ENCOUNTER — VIRTUAL VISIT (OUTPATIENT)
Dept: VASCULAR SURGERY | Facility: CLINIC | Age: 63
End: 2023-03-28
Attending: SPECIALIST
Payer: COMMERCIAL

## 2023-03-28 DIAGNOSIS — I83.893 SYMPTOMATIC VARICOSE VEINS OF BOTH LOWER EXTREMITIES: Primary | ICD-10-CM

## 2023-03-28 PROCEDURE — 99212 OFFICE O/P EST SF 10 MIN: CPT | Mod: VID | Performed by: SPECIALIST

## 2023-03-28 NOTE — PROGRESS NOTES
"[facility] Vascular Clinic        Patient is here for a follow up  to discuss 6 week follow up    Pt is currently taking no meds that would impact our treatment plan.    Vitals - Patient Reported  Systolic (Patient Reported):  (No Recent Reading)  Weight (Patient Reported): 122.5 kg (270 lb)  Height (Patient Reported): 160.7 cm (5' 3.25\")  BMI (Based on Pt Reported Ht/Wt): 47.45  Pain Score: No Pain (0)      The provider has been notified that the patient has no concerns.     Questions patient would like addressed today are: Left Ankle has been numb since procedure.    Refills are needed: No    Has homecare services and agency name:  Karen Dixon    "

## 2023-03-28 NOTE — PROGRESS NOTES
Post Procedure Note Endovenous Closure    Video Visit  Duration 15 minutes    S: Riana Cui is a 62 year old female S/P Left  greater saphenous vein leg endovenous closure ofLeft  greater saphenous veinlower ext. 6 weeks out from procedure. Doing well, wore female  thigh high socks. Minimal discomfort. Some superficial phlibitis. Which is resolving.     O: There were no vitals filed for this visit.    Status: doing well    General: no apparent distress  Legs look good no signs of infection, incisions healing nicely.           Imaging:    Exam Information    Exam Date Exam Time Accession # Performing Department Results    2/16/23 11:29 AM [id number] [facility] Imaging [place]      PACS Images     Show images for US Venous Post Ablation Leg Left     Study Result    Narrative & Impression   Left Venous Ultrasound Status Post Radiofrequency Ablation (Date: 02/16/23)        Indication: Follow-up saphenous vein Radiofrequency ablation     Date of Procedure: Left 2/15/23       Left CFV/SFJ Compression:  Fully Compressible     Reference:   (FC)-Fully Compressible           (PC)-Partially Compressible   (NC) Non-Compressible     Location Left GSV   Proximal Thigh NC   Mid Thigh PC   Distal Thigh NC   Knee NC   Proximal Calf NC   Mid Calf NC   Distal Calf NC            Impression: Noncompressible left great saphenous vein consistent with recent radiofrequency ablation procedure.         A/P: S/p endovenous closure. For insuffiencey of veins     May switch to knee high support socks   Resume all activities   RTC 6 months   Call for any questions or concerns     Armando Hobson MD   [facility] Surgery

## 2023-03-28 NOTE — NURSING NOTE
Chief Complaint   Patient presents with     Follow Up       Patient confirms medications and allergies are accurate via patients echeck in completion, and or denies any changes since last reviewed/verified.     Mare Dixon, Virtual Facilitator    Is the patient currently in the state of [place]? YES    Visit mode:VIDEO    If the visit is dropped, the patient can be reconnected by: VIDEO VISIT: Text to cell phone: [phone]    Will anyone else be joining the visit? NO      How would you like to obtain your AVS? MyChart    Are changes needed to the allergy or medication list? NO    Reason for visit: 6 week follow up

## 2023-04-12 ENCOUNTER — TRANSFERRED RECORDS (OUTPATIENT)
Dept: HEALTH INFORMATION MANAGEMENT | Facility: CLINIC | Age: 63
End: 2023-04-12
Payer: COMMERCIAL

## 2023-04-26 ENCOUNTER — E-VISIT (OUTPATIENT)
Dept: URGENT CARE | Facility: CLINIC | Age: 63
End: 2023-04-26
Payer: COMMERCIAL

## 2023-04-26 DIAGNOSIS — R19.7 VOMITING AND DIARRHEA: Primary | ICD-10-CM

## 2023-04-26 DIAGNOSIS — R11.10 VOMITING AND DIARRHEA: Primary | ICD-10-CM

## 2023-04-26 PROCEDURE — 99207 PR NON-BILLABLE SERV PER CHARTING: CPT | Performed by: FAMILY MEDICINE

## 2023-04-27 ENCOUNTER — NURSE TRIAGE (OUTPATIENT)
Dept: FAMILY MEDICINE | Facility: CLINIC | Age: 63
End: 2023-04-27
Payer: COMMERCIAL

## 2023-04-27 NOTE — TELEPHONE ENCOUNTER
Called and left message for patient to call back and speak with the nurse for triage.    See separate encounter.    Provider E-Visit time total (minutes): 0

## 2023-04-27 NOTE — TELEPHONE ENCOUNTER
Can you please triage this patient and try to figure out what is happening and if she needs to be seen?

## 2023-04-27 NOTE — TELEPHONE ENCOUNTER
Called Riana and left message to call back.  Intention is to triage GI concerns noted below in an E-visit.             4/26/2023  4:07 PM CDT - Filed by Patient   Please describe your symptoms. I have been sick off and on for couple of weeks with throwing up, headache and diarrhea.  I was good for 4 days, then it came back same symptoms but worse. Now I seem to have nausea the past 2 days and having the dry heaves.  Not coughing, no blood.  Feel week and light headed.  I just feel off and not sure if it's something else or just my body try to fight to get better.  I would like to speak with Roxy Hagen.

## 2023-05-01 NOTE — TELEPHONE ENCOUNTER
Sent patient a [employer] message regarding this E-visit to have her reach out to nurse triage or message in if symptoms resolved.    Tree Martinez RN     [facility]        Provider E-Visit time total (minutes): 0

## 2023-05-08 NOTE — TELEPHONE ENCOUNTER
Attempted to call patient to follow up on evisit and nurse triage, LVM.  Patient has not read [employer] message sent 5/01/23 regarding these symptoms either.

## 2023-05-08 NOTE — TELEPHONE ENCOUNTER
Attempted to call patient to follow up on evisit and nurse triage, LVM.  Patient has not read [employer] message sent 5/01/23 regarding these symptoms either.

## 2023-05-09 ENCOUNTER — TELEPHONE (OUTPATIENT)
Dept: VASCULAR SURGERY | Facility: CLINIC | Age: 63
End: 2023-05-09
Payer: COMMERCIAL

## 2023-05-09 NOTE — TELEPHONE ENCOUNTER
Caller: Patient    Provider: MD Armando Hobson    Detailed reason for call: Patient called stating she needs to schedule an ultrasound; she says she has been having swelling and pain in the left leg for the last week. L GSV RFA done with Dr. Hobson back in Feb.    Best phone number to contact: [phone]    Best time to contact: Any    Ok to leave a detailed message: Yes    Ok to speak to authorized person if needed: No

## 2023-05-09 NOTE — TELEPHONE ENCOUNTER
Spoke with patient regarding leg pain and swelling. Pain in left  thigh rated at 4 on scale to 10. Swelling improved with elevation. No redness or VV on surface.Reviewed past photos and notes. History of phlebitis. New symptoms left thigh. Advised to use warm packs, compression and Ibuprofen which patient can't take, will use Tylenol. Advised to call if no improvement.Moved up 6 month f/u to 5/23/23. Will forward this to Dr Hobson.

## 2023-05-10 DIAGNOSIS — F41.1 ANXIETY STATE: ICD-10-CM

## 2023-05-10 RX ORDER — PAROXETINE 40 MG/1
TABLET, FILM COATED ORAL
Qty: 90 TABLET | Refills: 1 | Status: SHIPPED | OUTPATIENT
Start: 2023-05-10 | End: 2024-01-18

## 2023-05-10 NOTE — TELEPHONE ENCOUNTER
"  Last Written Prescription Date:  08/19/2022  Last Fill Quantity: 90,  # refills: 2   Last office visit provider:  11/21/2022     Requested Prescriptions   Pending Prescriptions Disp Refills     PARoxetine (PAXIL) 40 MG tablet [Pharmacy Med Name: PAROXETINE 40MG TABLETS] 90 tablet 2     Sig: TAKE 1 TABLET(40 MG) BY MOUTH EVERY MORNING       SSRIs Protocol Passed - 5/10/2023  4:34 PM        Passed - Recent (12 mo) or future (30 days) visit within the authorizing provider's specialty     Patient has had an office visit with the authorizing provider or a provider within the authorizing providers department within the previous 12 mos or has a future within next 30 days. See \"Patient Info\" tab in inbasket, or \"Choose Columns\" in Meds & Orders section of the refill encounter.              Passed - Medication is active on med list        Passed - Patient is age 18 or older        Passed - No active pregnancy on record        Passed - No positive pregnancy test in last 12 months             Karely Cerda RN 05/10/23 4:34 PM  "

## 2023-05-23 ENCOUNTER — ANCILLARY PROCEDURE (OUTPATIENT)
Dept: VASCULAR ULTRASOUND | Facility: CLINIC | Age: 63
End: 2023-05-23
Attending: SPECIALIST
Payer: COMMERCIAL

## 2023-05-23 ENCOUNTER — OFFICE VISIT (OUTPATIENT)
Dept: VASCULAR SURGERY | Facility: CLINIC | Age: 63
End: 2023-05-23
Attending: SPECIALIST
Payer: COMMERCIAL

## 2023-05-23 VITALS — HEART RATE: 72 BPM | SYSTOLIC BLOOD PRESSURE: 128 MMHG | TEMPERATURE: 97.8 F | DIASTOLIC BLOOD PRESSURE: 76 MMHG

## 2023-05-23 DIAGNOSIS — I82.412 ACUTE DEEP VEIN THROMBOSIS (DVT) OF FEMORAL VEIN OF LEFT LOWER EXTREMITY (H): Primary | ICD-10-CM

## 2023-05-23 DIAGNOSIS — I83.893 SYMPTOMATIC VARICOSE VEINS OF BOTH LOWER EXTREMITIES: ICD-10-CM

## 2023-05-23 DIAGNOSIS — M79.89 LEG SWELLING: ICD-10-CM

## 2023-05-23 DIAGNOSIS — I89.0 SECONDARY LYMPHEDEMA: ICD-10-CM

## 2023-05-23 PROCEDURE — 93971 EXTREMITY STUDY: CPT | Mod: LT

## 2023-05-23 PROCEDURE — G0463 HOSPITAL OUTPT CLINIC VISIT: HCPCS | Mod: 25 | Performed by: SPECIALIST

## 2023-05-23 PROCEDURE — 99213 OFFICE O/P EST LOW 20 MIN: CPT | Performed by: SPECIALIST

## 2023-05-23 RX ORDER — APIXABAN 5 MG (74)
KIT ORAL
Qty: 1 EACH | Refills: 0 | Status: SHIPPED | OUTPATIENT
Start: 2023-05-23 | End: 2023-06-22

## 2023-05-23 ASSESSMENT — PAIN SCALES - GENERAL: PAINLEVEL: SEVERE PAIN (7)

## 2023-05-23 NOTE — PROGRESS NOTES
Post Procedure Note Endovenous Closure    S: Riana Cui is a 62 year old female S/P Left  greater saphenous vein leg endovenous closure ofLeft  greater saphenous veinlower ext. 6 weeks out from procedure. Doing well, wore female  thigh high socks. Minimal discomfort. Some superficial phlibitis. Which is resolving.   Was doing good to begin with but develop swelling a few days ago ofleft lower ext      O:   Vitals:    05/23/23 1122   BP: 128/76   Pulse: 72   Temp: 97.8  F (36.6  C)       Status: having discomfort    General: no apparent distress  Legs look good no signs of infection, incisions healing nicely.      Side:: Left  VCSS  PAIN:: Severe: Daily, severe limiting activities or requiring regular use of pain meds  Varicose Veins:: Mild: Few scattered  Venous Edema:: Severe: Extensive: Morning swelling above ankle and requiring activity change/elevation  Skin Pigmentation:: Absent: None  Inflamation:: Absent: None  Induration:: Absent: None  Number of active ulcers:: 0  Active ulcer duration:: None  Active ulcer diameter:: None  Compression Therapy:: Full compliance stockings + elevation  VCSS Score:: 10  CEAP:: Superficial spider veins (reticular veins) only    Imaging:    US today shows dvt proximal femoral vein to popliteal    A/P: S/p endovenous closure. For insuffiencey of veins     eliquis to start today  Follow up in three weeks with US  We discussed things to watch out for increased swelling bleeding difficulties issues like that they should go to emergency room immediately.    Armando Hobson MD   [facility] Surgery

## 2023-05-23 NOTE — PATIENT INSTRUCTIONS
Continue compression start Eliquis and return to clinic in 3 weeks with Ultrasound prior to visit with Dr Hobson.

## 2023-05-23 NOTE — PROGRESS NOTES
VV 4 month due to pain s/p L  GSV RFA 2/15/23 5/9/23 called with c/o left leg pain/phlebitis/swelling/ photos sent 5/22.Lleft leg venous duplex positive DVT. Will start eliquis and repeat US in 3 weeks and RTC. .Has been using thigh high compression and elevation,swelling continues to worsen and pain.

## 2023-05-23 NOTE — NURSING NOTE
[facility]        Patient is here for a  follow up  to discuss Varicose vein(s). The patient has varicose veins that are problematic in left legs. Symptoms patient has been experiencing are heaviness, aching, tiredness, cramps and  swelling. Patient states their varicose veins are bothersome when standing, sitting,  working and household chores. Patient  has been wearing compression stockings. Patient has not been using pain medication or anti-inflammatory's. Patient has not had recent imaging on legs done.    Pt is currently taking no meds that would impact our treatment plan.    /76   Pulse 72   Temp 97.8  F (36.6  C)   LMP  (LMP Unknown)     The provider has been notified that the patient has concerns of left leg swelling and pain.     Nadine Gregory MA

## 2023-05-26 ENCOUNTER — MYC MEDICAL ADVICE (OUTPATIENT)
Dept: FAMILY MEDICINE | Facility: CLINIC | Age: 63
End: 2023-05-26
Payer: COMMERCIAL

## 2023-05-30 ENCOUNTER — MYC MEDICAL ADVICE (OUTPATIENT)
Dept: VASCULAR SURGERY | Facility: CLINIC | Age: 63
End: 2023-05-30
Payer: COMMERCIAL

## 2023-05-30 NOTE — TELEPHONE ENCOUNTER
Yes, I would suggest at least a virtual visit so I can talk to her about what she is wanting me to put on the form.  I have not seen her since I did the last form which was November, so I do not really know what is been going on.

## 2023-06-14 ENCOUNTER — ANCILLARY PROCEDURE (OUTPATIENT)
Dept: VASCULAR ULTRASOUND | Facility: CLINIC | Age: 63
End: 2023-06-14
Attending: SPECIALIST
Payer: COMMERCIAL

## 2023-06-14 ENCOUNTER — OFFICE VISIT (OUTPATIENT)
Dept: VASCULAR SURGERY | Facility: CLINIC | Age: 63
End: 2023-06-14
Attending: SPECIALIST
Payer: COMMERCIAL

## 2023-06-14 DIAGNOSIS — I83.893 SYMPTOMATIC VARICOSE VEINS OF BOTH LOWER EXTREMITIES: ICD-10-CM

## 2023-06-14 DIAGNOSIS — I89.0 SECONDARY LYMPHEDEMA: ICD-10-CM

## 2023-06-14 DIAGNOSIS — I82.412 ACUTE DEEP VEIN THROMBOSIS (DVT) OF FEMORAL VEIN OF LEFT LOWER EXTREMITY (H): ICD-10-CM

## 2023-06-14 DIAGNOSIS — M79.89 LEG SWELLING: ICD-10-CM

## 2023-06-14 DIAGNOSIS — I82.412 ACUTE DEEP VEIN THROMBOSIS (DVT) OF FEMORAL VEIN OF LEFT LOWER EXTREMITY (H): Primary | ICD-10-CM

## 2023-06-14 PROCEDURE — G0463 HOSPITAL OUTPT CLINIC VISIT: HCPCS | Mod: 25 | Performed by: SPECIALIST

## 2023-06-14 PROCEDURE — 93971 EXTREMITY STUDY: CPT | Mod: LT

## 2023-06-14 PROCEDURE — 93971 EXTREMITY STUDY: CPT | Mod: 26 | Performed by: SURGERY

## 2023-06-14 PROCEDURE — 99213 OFFICE O/P EST LOW 20 MIN: CPT | Performed by: SPECIALIST

## 2023-06-14 NOTE — PROGRESS NOTES
[facility]        Patient is here for a follow up to discuss result of ultrasound done prior to appointment. Left Leg DVT from Femoral Vein Proximal Thigh to Popliteal Vein and started Eliquis 5/23/23. Left GSV RFA done 2/15/2023.       UGO CALVERT RN

## 2023-06-14 NOTE — PATIENT INSTRUCTIONS
Tiffanie Mayers,    Thank you for entrusting your care with us today. After your visit today with MD Armando Hobson this is the plan that was discussed at your appointment.    Continue on Eliquis and follow up on 8/8/2023 with another ultrasound.    I am including additional information on these things and our contact information if you have any questions or concerns.   Please do not hesitate to reach out to us if you felt we did not answer your questions or you are unsure of the treatment plan after your visit today. Our number is [phone].Thank you for trusting us with your care.         Again thank you for your time.

## 2023-06-15 ENCOUNTER — MYC MEDICAL ADVICE (OUTPATIENT)
Dept: VASCULAR SURGERY | Facility: CLINIC | Age: 63
End: 2023-06-15
Payer: COMMERCIAL

## 2023-06-15 NOTE — PROGRESS NOTES
[facility] Surgery Follow up    HPI:    62 year old year old female who returns for a follow up.  Patient had a closure procedure and postprocedural clot DVT.  Started on Eliquis is here for follow-up ultrasound of that.  Her procedure was done February 15 she had a follow-up ultrasound the  and had a DVT study her finding on May 23.    Allergies:Ibuprofen, Shellfish-derived products, and Rofecoxib    Past Medical History:   Diagnosis Date     Anaphylactic reaction 06/10/2015    to ABX, toungue swelling     Anxiety      Bladder disorder      Colitis 2014     Obesity      PONV (postoperative nausea and vomiting)        Past Surgical History:   Procedure Laterality Date     ABDOMEN SURGERY        SECTION       CHOLECYSTECTOMY       COLONOSCOPY W/ BIOPSIES N/A 6/3/2021    Procedure: Colonoscopy with polypectomies;  Surgeon: Cruzito Field MD;  Location: [facility] OR;  Service: Gastroenterology     GASTRIC BYPASS       HC KNEE SCOPE,SINGLE MENISECTOMY Left 2015    Procedure: LEFT KNEE ARTHROSCOPY , PARTIAL MEDIAL AND LATERAL MENISECTOMY;  Surgeon: Gato Quiñones MD;  Location: [facility] OR;  Service: Orthopedics     HERNIA REPAIR, UMBILICAL      X 3       CURRENT MEDS:  Current Outpatient Medications   Medication     apixaban ANTICOAGULANT (ELIQUIS) 5 MG tablet     Apixaban Starter Pack (ELIQUIS DVT/PE STARTER PACK) 5 MG TBPK     PARoxetine (PAXIL) 40 MG tablet     No current facility-administered medications for this visit.       Family History   Problem Relation Age of Onset     Heart Disease Mother      Coronary Artery Disease Father      Cancer Brother      Coronary Artery Disease Brother      Cancer Brother         reports that she has been smoking cigarettes. She has a 17.00 pack-year smoking history. She has never used smokeless tobacco. She reports that she does not drink alcohol and does not use drugs.    Review of Systems:  Negative except history of DVT about almost  2 to 3 months after her procedure.  Now is on Eliquis for the last few weeks with improvement of symptoms and swelling.Otherwise [facility] system of review is negative.      OBJECTIVE:  There were no vitals filed for this visit.  There is no height or weight on file to calculate BMI.    Status: doing well and received treatment for DVT and is resolving    EXAM:  GENERAL: This is a well-developed 62 year old female who appears her stated age  HEAD: normocephalic  HEENT: Pupils equal and reactive bilaterally  CARDIAC: RRR without murmur  CHEST/LUNG:  Clear to auscultation  ABDOMEN: Soft, nontender, nondistended, no masses    NEUROLOGIC: Focally intact, nonfocal  VASCULAR: Pulses intact, symmetrical upper and lower extremities.         LABS:  Lab Results   Component Value Date    WBC 6.5 09/10/2022    HGB 12.4 09/10/2022    HCT 37.7 09/10/2022    MCV 90 09/10/2022     09/10/2022     Lab Results   Component Value Date    ALT 13 09/08/2022    AST 23 09/08/2022    ALKPHOS 85 09/08/2022        Images:     Exam Information    Exam Date Exam Time Accession # Performing Department Results    6/14/23 10:00 AM [id number] [facility] [place]      PACS Images     Show images for US Lower Extremity Venous Duplex Left     Study Result    Narrative & Impression   LEFT Venous Ultrasound (Date: 06/14/23)    LEFT Lower Extremity          Indication:  Surveillance Left Leg Swelling and Pain. Hx: Left GSV RFA done 2/15/2023.  Lower extremity pain/swelling     Previous: 05/23/23     Patient History Includes: Edema, Varicose Veins, and Obesity     Left Leg Venous Doppler     Location CFV SPJ SFV/PFV PROX SFV MID SFV DIST POP   V. PERONEAL V. PTV'S   Compressibility  (FC,PC,NC) FC FC FC FC FC PC PC FC   Spontaneous + + + + + -   +   Phasic  + + + + + -   +   Augmentation + + + + + -   +   Patent + + + + + - - +      Comments: Area of previous GSV ablation is PC and NC. Patient is seeing   Jazlyn in clinic shortly.        Impression: No evidence of Deep Vein Thrombus        Reference:     Compressibility: FC= Fully compressible, PC= Partially compressible, NC= Non-compressible, NV= Not Visualized     Venous Doppler: (+) = Present  (0) = Absent  (-) = Decreased/Unable to Evaluate, (NV) = Not Visualized          Assessment/Plan:   Acute deep vein thrombosis (DVT) of femoral vein of left lower extremity (H)     At this time point looks to have resolution of her clot.  From our standpoint we will continue on Eliquis for 3 months time.  And I will see her back in about 8 weeks to complete that 3 months.  MO I will probably just do a quick ultrasound make sure everything is okay and then probably take her off Eliquis.     I think this needs to be considered a provoked and she did have a closure 3 months beforehand.    Return in about 8 weeks (around 8/8/2023).     Armando Hobson MD ,MD  [facility] Department of Surgery

## 2023-06-16 NOTE — TELEPHONE ENCOUNTER
Form filled out by Dr. Hobson and scanned to her e-mail ([email or web address] as requested. Form sent to be scanned to chart.

## 2023-07-07 ENCOUNTER — ANCILLARY PROCEDURE (OUTPATIENT)
Dept: MAMMOGRAPHY | Facility: CLINIC | Age: 63
End: 2023-07-07
Attending: FAMILY MEDICINE
Payer: COMMERCIAL

## 2023-07-07 DIAGNOSIS — Z12.31 VISIT FOR SCREENING MAMMOGRAM: ICD-10-CM

## 2023-07-07 PROCEDURE — 77067 SCR MAMMO BI INCL CAD: CPT

## 2023-07-18 DIAGNOSIS — I82.412 ACUTE DEEP VEIN THROMBOSIS (DVT) OF FEMORAL VEIN OF LEFT LOWER EXTREMITY (H): ICD-10-CM

## 2023-08-08 ENCOUNTER — OFFICE VISIT (OUTPATIENT)
Dept: VASCULAR SURGERY | Facility: CLINIC | Age: 63
End: 2023-08-08
Attending: SPECIALIST
Payer: COMMERCIAL

## 2023-08-08 ENCOUNTER — ANCILLARY PROCEDURE (OUTPATIENT)
Dept: VASCULAR ULTRASOUND | Facility: CLINIC | Age: 63
End: 2023-08-08
Attending: SPECIALIST
Payer: COMMERCIAL

## 2023-08-08 VITALS
HEART RATE: 51 BPM | RESPIRATION RATE: 20 BRPM | TEMPERATURE: 97.8 F | OXYGEN SATURATION: 97 % | SYSTOLIC BLOOD PRESSURE: 153 MMHG | DIASTOLIC BLOOD PRESSURE: 78 MMHG

## 2023-08-08 DIAGNOSIS — I82.412 ACUTE DEEP VEIN THROMBOSIS (DVT) OF FEMORAL VEIN OF LEFT LOWER EXTREMITY (H): Primary | ICD-10-CM

## 2023-08-08 DIAGNOSIS — I82.412 ACUTE DEEP VEIN THROMBOSIS (DVT) OF FEMORAL VEIN OF LEFT LOWER EXTREMITY (H): ICD-10-CM

## 2023-08-08 PROCEDURE — 99212 OFFICE O/P EST SF 10 MIN: CPT | Performed by: SPECIALIST

## 2023-08-08 PROCEDURE — G0463 HOSPITAL OUTPT CLINIC VISIT: HCPCS | Mod: 25 | Performed by: SPECIALIST

## 2023-08-08 PROCEDURE — 93971 EXTREMITY STUDY: CPT | Mod: LT

## 2023-08-08 ASSESSMENT — PAIN SCALES - GENERAL: PAINLEVEL: NO PAIN (0)

## 2023-08-08 NOTE — Clinical Note
Dr. Hobson wanted pt to see LK for swelling. I forgot to schedule an appointment with her. Can you call her later and set this up? No rush. Thanks.

## 2023-08-10 ENCOUNTER — TRANSFERRED RECORDS (OUTPATIENT)
Dept: HEALTH INFORMATION MANAGEMENT | Facility: CLINIC | Age: 63
End: 2023-08-10
Payer: COMMERCIAL

## 2023-08-21 NOTE — PROGRESS NOTES
[facility] Surgery Follow up    HPI:    62 year old year old female who returns for a follow up.  Due to closure procedure of left GSV postprocedural DVT clot was put on anticoagulation here for follow-up.  Ultrasound on the day.  No symptoms or issues.  Wears compression    Allergies:Ibuprofen, Shellfish-derived products, and Rofecoxib    Past Medical History:   Diagnosis Date    Anaphylactic reaction 06/10/2015    to ABX, toungue swelling    Anxiety     Bladder disorder     Colitis 2014    Obesity     PONV (postoperative nausea and vomiting)        Past Surgical History:   Procedure Laterality Date    ABDOMEN SURGERY       SECTION      CHOLECYSTECTOMY      COLONOSCOPY W/ BIOPSIES N/A 6/3/2021    Procedure: Colonoscopy with polypectomies;  Surgeon: Cruzito Field MD;  Location: [facility] OR;  Service: Gastroenterology    GASTRIC BYPASS      HC KNEE SCOPE,SINGLE MENISECTOMY Left 2015    Procedure: LEFT KNEE ARTHROSCOPY , PARTIAL MEDIAL AND LATERAL MENISECTOMY;  Surgeon: Gato Quiñones MD;  Location: [facility];  Service: Orthopedics    HERNIA REPAIR, UMBILICAL      X 3       CURRENT MEDS:  Current Outpatient Medications   Medication    apixaban ANTICOAGULANT (ELIQUIS) 5 MG tablet    PARoxetine (PAXIL) 40 MG tablet     No current facility-administered medications for this visit.       Family History   Problem Relation Age of Onset    Heart Disease Mother     Coronary Artery Disease Father     Cancer Brother     Coronary Artery Disease Brother     Cancer Brother         reports that she has been smoking cigarettes. She has a 17.00 pack-year smoking history. She has never used smokeless tobacco. She reports that she does not drink alcohol and does not use drugs.    Review of Systems:  Negative except history of clot and dvt.   Stable no complaints.  Otherwise twelve system of review is negative.      OBJECTIVE:  Vitals:    23 0906   BP: (!) 153/78   Pulse: 51   Resp: 20    Temp: 97.8  F (36.6  C)   SpO2: 97%     There is no height or weight on file to calculate BMI.    Status: doing well    EXAM:  GENERAL: This is a well-developed 62 year old female who appears her stated age  HEAD: normocephalic  HEENT: Pupils equal and reactive bilaterally  CARDIAC: RRR without murmur  CHEST/LUNG:  Clear to auscultation  ABDOMEN: Soft, nontender, nondistended, no masses    NEUROLOGIC: Focally intact, nonfocal  VASCULAR: Pulses intact, symmetrical upper and lower extremities.    Side:: Left  VCSS  PAIN:: Severe: Daily, severe limiting activities or requiring regular use of pain meds  Varicose Veins:: Mild: Few scattered  Venous Edema:: Severe: Extensive: Morning swelling above ankle and requiring activity change/elevation  Skin Pigmentation:: Absent: None  Inflamation:: Absent: None  Induration:: Absent: None  Number of active ulcers:: 0  Active ulcer duration:: None  Active ulcer diameter:: None  Compression Therapy:: Full compliance stockings + elevation  VCSS Score:: 10  CEAP:: Superficial spider veins (reticular veins) only    LABS:  Lab Results   Component Value Date    WBC 6.5 09/10/2022    HGB 12.4 09/10/2022    HCT 37.7 09/10/2022    MCV 90 09/10/2022     09/10/2022     Lab Results   Component Value Date    ALT 13 09/08/2022    AST 23 09/08/2022    ALKPHOS 85 09/08/2022        Images:    Exam Information    Exam Date Exam Time Accession # Performing Department Results    8/8/23  9:07 AM [id number] [facility] [place]      PACS Images     Show images for US Lower Extremity Venous Duplex Left     Study Result    Narrative & Impression   LEFT Venous Ultrasound (Date: 08/08/23)    LEFT Lower Extremity          Indication:  Surveillance Left Leg for DVT found on 5/23/23. Hx: Left GSV / AASV RFA (2/15/2023), lower extremity pain/swelling     Previous: 6/14/2023, 5/23/2023     Patient History Includes: Edema, DVT, Varicose Veins, Anticoagulants,  and Obesity     Left Leg Venous Doppler           Location CFV SPJ SFV/PFV PROX SFV MID SFV DIST POP   V. PERONEAL V. PTV'S   Compressibility  (FC,PC,NC) FC FC FC FC FC PC FC FC   Spontaneous + + + + + + + +   Phasic  + + + + + + + +   Augmentation + + + + + + + +   Patent + + + + + + + +       Impression: Partially occlusive chronic deep vein thrombosis involving the popliteal vein.  Slight interval improvement in luminal patency since prior study dated 6/14/2023.        Reference:     Compressibility: FC= Fully compressible, PC= Partially compressible, NC= Non-compressible, NV= Not Visualized     Venous Doppler: (+) = Present  (0) = Absent  (-) = Decreased/Unable to Evaluate, (NV) = Not Visualized        Assessment/Plan:   Acute deep vein thrombosis (DVT) of femoral vein of left lower extremity (H)   Acute DVT is not really changed to a chronic 1 it appears to be resolving will help continues to contract to keep her on the anticoagulation for another 3 months and a 3-month follow-up visit.  Discussed and answered questions    Return in about 3 months (around 11/8/2023).     Armando Hobson MD ,MD  [facility] Department of Surgery

## 2023-08-22 ASSESSMENT — ENCOUNTER SYMPTOMS
ARTHRALGIAS: 0
PALPITATIONS: 0
COUGH: 0
DIZZINESS: 0
FREQUENCY: 0
HEARTBURN: 0
EYE PAIN: 0
HEMATURIA: 0
DYSURIA: 0
WEAKNESS: 0
NAUSEA: 0
JOINT SWELLING: 0
CHILLS: 0
FEVER: 0
PARESTHESIAS: 0
HEMATOCHEZIA: 0
BREAST MASS: 0
SORE THROAT: 0
MYALGIAS: 0
SHORTNESS OF BREATH: 0
ABDOMINAL PAIN: 0
CONSTIPATION: 0
NERVOUS/ANXIOUS: 0
DIARRHEA: 0
HEADACHES: 0

## 2023-08-28 PROBLEM — M25.819 OTHER AFFECTIONS OF SHOULDER REGION, NOT ELSEWHERE CLASSIFIED: Status: RESOLVED | Noted: 2022-05-31 | Resolved: 2023-08-28

## 2023-08-29 ENCOUNTER — OFFICE VISIT (OUTPATIENT)
Dept: FAMILY MEDICINE | Facility: CLINIC | Age: 63
End: 2023-08-29
Payer: COMMERCIAL

## 2023-08-29 VITALS
SYSTOLIC BLOOD PRESSURE: 124 MMHG | HEIGHT: 63 IN | RESPIRATION RATE: 20 BRPM | HEART RATE: 72 BPM | WEIGHT: 274.4 LBS | BODY MASS INDEX: 48.62 KG/M2 | DIASTOLIC BLOOD PRESSURE: 83 MMHG | TEMPERATURE: 98.1 F | OXYGEN SATURATION: 94 %

## 2023-08-29 DIAGNOSIS — M17.0 PRIMARY OSTEOARTHRITIS OF BOTH KNEES: ICD-10-CM

## 2023-08-29 DIAGNOSIS — F41.1 ANXIETY STATE: ICD-10-CM

## 2023-08-29 DIAGNOSIS — Z00.00 ROUTINE GENERAL MEDICAL EXAMINATION AT A HEALTH CARE FACILITY: Primary | ICD-10-CM

## 2023-08-29 DIAGNOSIS — Z87.891 PERSONAL HISTORY OF TOBACCO USE: ICD-10-CM

## 2023-08-29 DIAGNOSIS — I80.02 THROMBOPHLEBITIS OF SUPERFICIAL VEINS OF LEFT LOWER EXTREMITY: ICD-10-CM

## 2023-08-29 DIAGNOSIS — Z98.84 BARIATRIC SURGERY STATUS: ICD-10-CM

## 2023-08-29 DIAGNOSIS — E78.00 HYPERCHOLESTEREMIA: ICD-10-CM

## 2023-08-29 DIAGNOSIS — E66.01 MORBID OBESITY (H): ICD-10-CM

## 2023-08-29 PROBLEM — L03.116 CELLULITIS OF LEFT LOWER EXTREMITY: Status: RESOLVED | Noted: 2022-09-08 | Resolved: 2023-08-29

## 2023-08-29 LAB
ALBUMIN SERPL BCG-MCNC: 3.8 G/DL (ref 3.5–5.2)
ALP SERPL-CCNC: 78 U/L (ref 35–104)
ALT SERPL W P-5'-P-CCNC: 15 U/L (ref 0–50)
ANION GAP SERPL CALCULATED.3IONS-SCNC: 9 MMOL/L (ref 7–15)
AST SERPL W P-5'-P-CCNC: 21 U/L (ref 0–45)
BILIRUB SERPL-MCNC: 0.2 MG/DL
BUN SERPL-MCNC: 19.3 MG/DL (ref 8–23)
CALCIUM SERPL-MCNC: 9 MG/DL (ref 8.8–10.2)
CHLORIDE SERPL-SCNC: 103 MMOL/L (ref 98–107)
CHOLEST SERPL-MCNC: 272 MG/DL
CREAT SERPL-MCNC: 0.92 MG/DL (ref 0.51–0.95)
DEPRECATED HCO3 PLAS-SCNC: 26 MMOL/L (ref 22–29)
ERYTHROCYTE [DISTWIDTH] IN BLOOD BY AUTOMATED COUNT: 13.9 % (ref 10–15)
FERRITIN SERPL-MCNC: 19 NG/ML (ref 11–328)
FOLATE SERPL-MCNC: 4.7 NG/ML (ref 4.6–34.8)
GFR SERPL CREATININE-BSD FRML MDRD: 71 ML/MIN/1.73M2
GLUCOSE SERPL-MCNC: 91 MG/DL (ref 70–99)
HBA1C MFR BLD: 5.8 % (ref 0–5.6)
HCT VFR BLD AUTO: 42 % (ref 35–47)
HDLC SERPL-MCNC: 68 MG/DL
HGB BLD-MCNC: 13.4 G/DL (ref 11.7–15.7)
LDLC SERPL CALC-MCNC: 185 MG/DL
MCH RBC QN AUTO: 28 PG (ref 26.5–33)
MCHC RBC AUTO-ENTMCNC: 31.9 G/DL (ref 31.5–36.5)
MCV RBC AUTO: 88 FL (ref 78–100)
NONHDLC SERPL-MCNC: 204 MG/DL
PLATELET # BLD AUTO: 214 10E3/UL (ref 150–450)
POTASSIUM SERPL-SCNC: 4.5 MMOL/L (ref 3.4–5.3)
PROT SERPL-MCNC: 6.7 G/DL (ref 6.4–8.3)
PTH-INTACT SERPL-MCNC: 8 PG/ML (ref 15–65)
RBC # BLD AUTO: 4.79 10E6/UL (ref 3.8–5.2)
SODIUM SERPL-SCNC: 138 MMOL/L (ref 136–145)
TRIGL SERPL-MCNC: 95 MG/DL
VIT B12 SERPL-MCNC: 228 PG/ML (ref 232–1245)
WBC # BLD AUTO: 6.9 10E3/UL (ref 4–11)

## 2023-08-29 PROCEDURE — 90471 IMMUNIZATION ADMIN: CPT | Performed by: FAMILY MEDICINE

## 2023-08-29 PROCEDURE — 99000 SPECIMEN HANDLING OFFICE-LAB: CPT | Performed by: FAMILY MEDICINE

## 2023-08-29 PROCEDURE — 82728 ASSAY OF FERRITIN: CPT | Performed by: FAMILY MEDICINE

## 2023-08-29 PROCEDURE — 99214 OFFICE O/P EST MOD 30 MIN: CPT | Mod: 25 | Performed by: FAMILY MEDICINE

## 2023-08-29 PROCEDURE — 99396 PREV VISIT EST AGE 40-64: CPT | Mod: 25 | Performed by: FAMILY MEDICINE

## 2023-08-29 PROCEDURE — 82607 VITAMIN B-12: CPT | Performed by: FAMILY MEDICINE

## 2023-08-29 PROCEDURE — 82306 VITAMIN D 25 HYDROXY: CPT | Performed by: FAMILY MEDICINE

## 2023-08-29 PROCEDURE — 90472 IMMUNIZATION ADMIN EACH ADD: CPT | Performed by: FAMILY MEDICINE

## 2023-08-29 PROCEDURE — 83036 HEMOGLOBIN GLYCOSYLATED A1C: CPT | Performed by: FAMILY MEDICINE

## 2023-08-29 PROCEDURE — 90677 PCV20 VACCINE IM: CPT | Performed by: FAMILY MEDICINE

## 2023-08-29 PROCEDURE — 80061 LIPID PANEL: CPT | Performed by: FAMILY MEDICINE

## 2023-08-29 PROCEDURE — 83970 ASSAY OF PARATHORMONE: CPT | Performed by: FAMILY MEDICINE

## 2023-08-29 PROCEDURE — 90715 TDAP VACCINE 7 YRS/> IM: CPT | Performed by: FAMILY MEDICINE

## 2023-08-29 PROCEDURE — 82746 ASSAY OF FOLIC ACID SERUM: CPT | Performed by: FAMILY MEDICINE

## 2023-08-29 PROCEDURE — 84425 ASSAY OF VITAMIN B-1: CPT | Mod: 90 | Performed by: FAMILY MEDICINE

## 2023-08-29 PROCEDURE — 36415 COLL VENOUS BLD VENIPUNCTURE: CPT | Performed by: FAMILY MEDICINE

## 2023-08-29 PROCEDURE — 80053 COMPREHEN METABOLIC PANEL: CPT | Performed by: FAMILY MEDICINE

## 2023-08-29 PROCEDURE — 85027 COMPLETE CBC AUTOMATED: CPT | Performed by: FAMILY MEDICINE

## 2023-08-29 PROCEDURE — 84590 ASSAY OF VITAMIN A: CPT | Mod: 90 | Performed by: FAMILY MEDICINE

## 2023-08-29 PROCEDURE — G0296 VISIT TO DETERM LDCT ELIG: HCPCS | Performed by: FAMILY MEDICINE

## 2023-08-29 PROCEDURE — 84630 ASSAY OF ZINC: CPT | Mod: 90 | Performed by: FAMILY MEDICINE

## 2023-08-29 ASSESSMENT — ENCOUNTER SYMPTOMS
DYSURIA: 0
FEVER: 0
NAUSEA: 0
MYALGIAS: 0
PALPITATIONS: 0
SHORTNESS OF BREATH: 0
DIZZINESS: 0
PARESTHESIAS: 0
ABDOMINAL PAIN: 0
HEADACHES: 0
FREQUENCY: 0
WEAKNESS: 0
COUGH: 0
NERVOUS/ANXIOUS: 0
SORE THROAT: 0
ARTHRALGIAS: 0
HEMATURIA: 0
JOINT SWELLING: 0
HEARTBURN: 0
BREAST MASS: 0
CHILLS: 0
CONSTIPATION: 0
EYE PAIN: 0
HEMATOCHEZIA: 0
DIARRHEA: 0

## 2023-08-29 ASSESSMENT — ANXIETY QUESTIONNAIRES
GAD7 TOTAL SCORE: 2
IF YOU CHECKED OFF ANY PROBLEMS ON THIS QUESTIONNAIRE, HOW DIFFICULT HAVE THESE PROBLEMS MADE IT FOR YOU TO DO YOUR WORK, TAKE CARE OF THINGS AT HOME, OR GET ALONG WITH OTHER PEOPLE: SOMEWHAT DIFFICULT
4. TROUBLE RELAXING: NOT AT ALL
GAD7 TOTAL SCORE: 2
5. BEING SO RESTLESS THAT IT IS HARD TO SIT STILL: NOT AT ALL
3. WORRYING TOO MUCH ABOUT DIFFERENT THINGS: SEVERAL DAYS
7. FEELING AFRAID AS IF SOMETHING AWFUL MIGHT HAPPEN: NOT AT ALL
6. BECOMING EASILY ANNOYED OR IRRITABLE: NOT AT ALL
1. FEELING NERVOUS, ANXIOUS, OR ON EDGE: NOT AT ALL
2. NOT BEING ABLE TO STOP OR CONTROL WORRYING: SEVERAL DAYS

## 2023-08-29 ASSESSMENT — PATIENT HEALTH QUESTIONNAIRE - PHQ9
SUM OF ALL RESPONSES TO PHQ QUESTIONS 1-9: 5
SUM OF ALL RESPONSES TO PHQ QUESTIONS 1-9: 5
10. IF YOU CHECKED OFF ANY PROBLEMS, HOW DIFFICULT HAVE THESE PROBLEMS MADE IT FOR YOU TO DO YOUR WORK, TAKE CARE OF THINGS AT HOME, OR GET ALONG WITH OTHER PEOPLE: NOT DIFFICULT AT ALL

## 2023-08-29 NOTE — PROGRESS NOTES
SUBJECTIVE:   CC: Riana is an 62 year old who presents for preventive health visit.       8/29/2023     2:40 PM   Additional Questions   Roomed by        Healthy Habits:     Getting at least 3 servings of Calcium per day:  Yes    Bi-annual eye exam:  Yes    Dental care twice a year:  Yes    Sleep apnea or symptoms of sleep apnea:  None    Diet:  Regular (no restrictions)    Frequency of exercise:  1 day/week    Duration of exercise:  Less than 15 minutes    Taking medications regularly:  Yes    Additional concerns today:  Yes      Today's PHQ-9 Score:       8/29/2023     2:39 PM   PHQ-9 SCORE   PHQ-9 Total Score MyChart 5 (Mild depression)   PHQ-9 Total Score 5       Riana comes in today for her annual exam.  She has been following with an orthopedist and is getting steroid injections every 3 months.  She is going to need a knee replacement but is putting this off for about a year so she can work on some weight loss.  She will plan to see me back for weight loss intake.  In the meantime she is going to try to cut back on soda and junk food and try to get some daily walking.  We talked about trying to wean down on the Paxil as well as she has been taking this for anxiety for a long time and may not need it.  She is still smoking and we talked about lung cancer screening.  She is now on Eliquis for persistent DVT.  The vascular center wanted her to take it for another 3 months.            Have you ever done Advance Care Planning? (For example, a Health Directive, POLST, or a discussion with a medical provider or your loved ones about your wishes): Gave packet  today    Social History     Tobacco Use    Smoking status: Every Day     Packs/day: 0.50     Years: 34.00     Pack years: 17.00     Types: Cigarettes    Smokeless tobacco: Never    Tobacco comments:     pt declined info   Substance Use Topics    Alcohol use: No           8/22/2023     9:41 AM   Alcohol Use   Prescreen: >3 drinks/day or >7 drinks/week?  Not Applicable     Reviewed orders with patient.  Reviewed health maintenance and updated orders accordingly - Yes  Lab work is in process  Current Outpatient Medications   Medication Sig Dispense Refill    apixaban ANTICOAGULANT (ELIQUIS) 5 MG tablet Take 1 tablet (5 mg) by mouth 2 times daily 60 tablet 3    PARoxetine (PAXIL) 40 MG tablet TAKE 1 TABLET(40 MG) BY MOUTH EVERY MORNING 90 tablet 1       Breast Cancer Screenin/22/2023     9:42 AM   Breast CA Risk Assessment (FHS-7)   Do you have a family history of breast, colon, or ovarian cancer? No / Unknown         Mammogram Screening: Recommended mammography every 1-2 years with patient discussion and risk factor consideration  Pertinent mammograms are reviewed under the imaging tab.    History of abnormal Pap smear: NO - age 30-65 PAP every 5 years with negative HPV co-testing recommended      Latest Ref Rng & Units 2020     9:58 AM 9/3/2015     2:52 PM   PAP / HPV   PAP Negative for squamous intraepithelial lesion or malignancy. Negative for squamous intraepithelial lesion or malignancy  Electronically signed by Jesenia Simpson CT (ASCP) on 2020 at 11:21 AM    Negative for squamous intraepithelial lesion or malignancy  Electronically signed by Jesenia Simpson CT (ASCP) on 2015 at  9:18 AM      HPV 16 DNA NEG Negative     HPV 18 DNA NEG Negative     Other HR HPV NEG Negative       Reviewed and updated as needed this visit by clinical staff   Tobacco  Allergies  Meds              Reviewed and updated as needed this visit by Provider                     Review of Systems   Constitutional:  Negative for chills and fever.   HENT:  Negative for congestion, ear pain, hearing loss and sore throat.    Eyes:  Negative for pain and visual disturbance.   Respiratory:  Negative for cough and shortness of breath.    Cardiovascular:  Negative for chest pain, palpitations and peripheral edema.   Gastrointestinal:  Negative for abdominal pain,  "constipation, diarrhea, heartburn, hematochezia and nausea.   Breasts:  Negative for tenderness, breast mass and discharge.   Genitourinary:  Negative for dysuria, frequency, genital sores, hematuria, pelvic pain, urgency, vaginal bleeding and vaginal discharge.   Musculoskeletal:  Negative for arthralgias, joint swelling and myalgias.   Skin:  Negative for rash.   Neurological:  Negative for dizziness, weakness, headaches and paresthesias.   Psychiatric/Behavioral:  Negative for mood changes. The patient is not nervous/anxious.           OBJECTIVE:   /83   Pulse 72   Temp 98.1  F (36.7  C)   Resp 20   Ht 1.588 m (5' 2.5\")   Wt 124.5 kg (274 lb 6.4 oz)   LMP  (LMP Unknown)   SpO2 94%   BMI 49.39 kg/m    Physical Exam  GENERAL APPEARANCE: healthy, alert and no distress  EYES: Eyes grossly normal to inspection, PERRL and conjunctivae and sclerae normal  HENT: ear canals and TM's normal, nose and mouth without ulcers or lesions, oropharynx clear and oral mucous membranes moist  NECK: no adenopathy, no asymmetry, masses, or scars and thyroid normal to palpation  RESP: lungs clear to auscultation - no rales, rhonchi or wheezes  BREAST: normal without masses, tenderness or nipple discharge and no palpable axillary masses or adenopathy  CV: regular rate and rhythm, normal S1 S2, no S3 or S4, no murmur, click or rub, no peripheral edema and peripheral pulses strong  ABDOMEN: soft, nontender, no hepatosplenomegaly, no masses and bowel sounds normal  MS: no musculoskeletal defects are noted and gait is age appropriate without ataxia  SKIN: no suspicious lesions or rashes  NEURO: Normal strength and tone, sensory exam grossly normal, mentation intact and speech normal  PSYCH: mentation appears normal and affect normal/bright    Diagnostic Test Results:  Labs reviewed in Epic  Results for orders placed or performed in visit on 08/29/23 (from the past 24 hour(s))   CBC with platelets   Result Value Ref Range    WBC " Count 6.9 4.0 - 11.0 10e3/uL    RBC Count 4.79 3.80 - 5.20 10e6/uL    Hemoglobin 13.4 11.7 - 15.7 g/dL    Hematocrit 42.0 35.0 - 47.0 %    MCV 88 78 - 100 fL    MCH 28.0 26.5 - 33.0 pg    MCHC 31.9 31.5 - 36.5 g/dL    RDW 13.9 10.0 - 15.0 %    Platelet Count 214 150 - 450 10e3/uL   Hemoglobin A1c   Result Value Ref Range    Hemoglobin A1C 5.8 (H) 0.0 - 5.6 %       ASSESSMENT/PLAN:   1. Routine general medical examination at a health care facility  Riana presents for annual exam.  As far as healthcare maintenance, she had a colonoscopy in 2021 but it is unclear when they wanted to see her back.  We will check but I am suspecting a 5-year follow-up.  Her mammogram is up-to-date.  Her next Pap smear is due in 2025.  We will update her Prevnar 12 and her tetanus booster today.    2. Anxiety  She feels like her anxiety is under good control.  She has been on 40 mg of Paxil for many years.  Would recommend cutting it in half and see how she does.  I am concerned this may be contributing to her weight gain.    3. Post-gastric Bypass For Obesity  She is due for all her bariatric labs today.  - CBC with platelets; Future  - Comprehensive metabolic panel; Future  - Lipid panel reflex to direct LDL Fasting; Future  - Hemoglobin A1c; Future  - Ferritin; Future  - Folate; Future  - Parathyroid Hormone Intact; Future  - Vitamin A; Future  - Vitamin B1 plasma; Future  - Vitamin B12; Future  - Vitamin D Deficiency; Future  - Zinc; Future  - CBC with platelets  - Comprehensive metabolic panel  - Lipid panel reflex to direct LDL Fasting  - Hemoglobin A1c  - Ferritin  - Folate  - Parathyroid Hormone Intact  - Vitamin A  - Vitamin B1 plasma  - Vitamin B12  - Vitamin D Deficiency  - Zinc    4. Hypercholesteremia  She has had mildly elevated cholesterol.  We will recheck today.  - Lipid panel reflex to direct LDL Fasting; Future  - Lipid panel reflex to direct LDL Fasting    5. Thrombophlebitis of superficial veins of left lower  "extremity  She has been following with the vascular center.  She had a DVT following a vein procedure which has become somewhat chronic.  She still on the Eliquis and they want her on it at least another 3 months.    6. Personal history of tobacco use  She meets criteria for lung cancer screening.  She still smoking and has a 40-pack-year history.  - Prof fee: Shared Decision Making for Lung Cancer Screening  - CT Chest Lung Cancer Scrn Low Dose wo; Future    7. Morbid obesity (H)  She has a history of gastric bypass and has had weight regain.  Her A1c is now 5.8 so she has prediabetes.  She is going to start off by decreasing soda, junk food, and starting to walk daily.  She will see me for a weight intake at the next available.    8. Primary osteoarthritis of both knees  She is currently getting injections but will need a knee replacement next year.  Hopefully with some weight loss, this will decrease her surgical risk.    Patient has been advised of split billing requirements and indicates understanding: Yes      COUNSELING:  Reviewed preventive health counseling, as reflected in patient instructions       Regular exercise       Healthy diet/nutrition      BMI:   Estimated body mass index is 49.39 kg/m  as calculated from the following:    Height as of this encounter: 1.588 m (5' 2.5\").    Weight as of this encounter: 124.5 kg (274 lb 6.4 oz).   Weight management plan: Specific weight management program called comprehensive weight management discussed      She reports that she has been smoking cigarettes. She has a 17.00 pack-year smoking history. She has never used smokeless tobacco.  Nicotine/Tobacco Cessation Plan:   Information offered: Patient not interested at this time          Roxy Hagen MD  [facility] CenterAnswers submitted by the patient for this visit:  Patient Health Questionnaire (Submitted on 8/29/2023)  If you checked off any problems, how difficult have these problems " made it for you to do your work, take care of things at home, or get along with other people?: Not difficult at all  PHQ9 TOTAL SCORE: 5  AUGUSTO-7 (Submitted on 8/29/2023)  AUGUSTO 7 TOTAL SCORE: 2  Prior to immunization administration, verified patients identity using patient s name and date of birth. Please see Immunization Activity for additional information.     Screening Questionnaire for Adult Immunization    Are you sick today?   No   Do you have allergies to medications, food, a vaccine component or latex?   No   Have you ever had a serious reaction after receiving a vaccination?   No   Do you have a long-term health problem with heart, lung, kidney, or metabolic disease (e.g., diabetes), asthma, a blood disorder, no spleen, complement component deficiency, a cochlear implant, or a spinal fluid leak?  Are you on long-term aspirin therapy?   No   Do you have cancer, leukemia, HIV/AIDS, or any other immune system problem?   No   Do you have a parent, brother, or sister with an immune system problem?   No   In the past 3 months, have you taken medications that affect  your immune system, such as prednisone, other steroids, or anticancer drugs; drugs for the treatment of rheumatoid arthritis, Crohn s disease, or psoriasis; or have you had radiation treatments?   No   Have you had a seizure, or a brain or other nervous system problem?   No   During the past year, have you received a transfusion of blood or blood    products, or been given immune (gamma) globulin or antiviral drug?   No   For women: Are you pregnant or is there a chance you could become       pregnant during the next month?   No   Have you received any vaccinations in the past 4 weeks?   No     Immunization questionnaire answers were all negative.      Patient instructed to remain in clinic for 15 minutes afterwards, and to report any adverse reactions.     Screening performed by Yola Matthew on 8/29/2023 at 3:37 PM.

## 2023-08-29 NOTE — PATIENT INSTRUCTIONS
Lung Cancer Screening   Frequently Asked Questions  If you are at high-risk for lung cancer, getting screened with low-dose computed tomography (LDCT) every year can help save your life. This handout offers answers to some of the most common questions about lung cancer screening. If you have other questions, please call [phone] ([phone]).     What is it?  Lung cancer screening uses special X-ray technology to create an image of your lung tissue. The exam is quick and easy and takes less than 10 seconds. We don t give you any medicine or use any needles. You can eat before and after the exam. You don t need to change your clothes as long as the clothing on your chest doesn t contain metal. But, you do need to be able to hold your breath for at least 6 seconds during the exam.    What is the goal of lung cancer screening?  The goal of lung cancer screening is to save lives. Many times, lung cancer is not found until a person starts having physical symptoms. Lung cancer screening can help detect lung cancer in the earliest stages when it may be easier to treat.    Who should be screened for lung cancer?  We suggest lung cancer screening for anyone who is at high-risk for lung cancer. You are in the high-risk group if you:      are between the ages of 55 and 79, and    have smoked at least 1 pack of cigarettes a day for 20 or more years, and    still smoke or have quit within the past 15 years.    However, if you have a new cough or shortness of breath, you should talk to your doctor before being screened.    Why does it matter if I have symptoms?  Certain symptoms can be a sign that you have a condition in your lungs that should be checked and treated by your doctor. These symptoms include fever, chest pain, a new or changing cough, shortness of breath that you have never felt before, coughing up blood or unexplained weight loss. Having any of these symptoms can greatly affect the results of lung  cancer screening.       Should all smokers get an LDCT lung cancer screening exam?  It depends. Lung cancer screening is for a very specific group of men and women who have a history of heavy smoking over a long period of time (see  Who should be screened for lung cancer  above).  I am in the high-risk group, but have been diagnosed with cancer in the past. Is LDCT lung cancer screening right for me?  In some cases, you should not have LDCT lung screening, such as when your doctor is already following your cancer with CT scan studies. Your doctor will help you decide if LDCT lung screening is right for you.  Do I need to have a screening exam every year?  Yes. If you are in the high-risk group described earlier, you should get an LDCT lung cancer screening exam every year until you are 79, or are no longer willing or able to undergo screening and possible procedures to diagnose and treat lung cancer.  How effective is LDCT at preventing death from lung cancer?  Studies have shown that LDCT lung cancer screening can lower the risk of death from lung cancer by 20 percent in people who are at high-risk.  What are the risks?  There are some risks and limitations of LDCT lung cancer screening. We want to make sure you understand the risks and benefits, so please let us know if you have any questions. Your doctor may want to talk with you more about these risks.    Radiation exposure: As with any exam that uses radiation, there is a very small increased risk of cancer. The amount of radiation in LDCT is small--about the same amount a person would get from a mammogram. Your doctor orders the exam when he or she feels the potential benefits outweigh the risks.    False negatives: No test is perfect, including LDCT. It is possible that you may have a medical condition, including lung cancer, that is not found during your exam. This is called a false negative result.    False positives and more testing: LDCT very often finds  something in the lung that could be cancer, but in fact is not. This is called a false positive result. False positive tests often cause anxiety. To make sure these findings are not cancer, you may need to have more tests. These tests will be done only if you give us permission. Sometimes patients need a treatment that can have side effects, such as a biopsy. For more information on false positives, see  What can I expect from the results?     Findings not related to lung cancer: Your LDCT exam also takes pictures of areas of your body next to your lungs. In a very small number of cases, the CT scan will show an abnormal finding in one of these areas, such as your kidneys, adrenal glands, liver or thyroid. This finding may not be serious, but you may need more tests. Your doctor can help you decide what other tests you may need, if any.  What can I expect from the results?  About 1 out of 4 LDCT exams will find something that may need more tests. Most of the time, these findings are lung nodules. Lung nodules are very small collections of tissue in the lung. These nodules are very common, and the vast majority--more than 97 percent--are not cancer (benign). Most are normal lymph nodes or small areas of scarring from past infections.  But, if a small lung nodule is found to be cancer, the cancer can be cured more than 90 percent of the time. To know if the nodule is cancer, we may need to get more images before your next yearly screening exam. If the nodule has suspicious features (for example, it is large, has an odd shape or grows over time), we will refer you to a specialist for further testing.  Will my doctor also get the results?  Yes. Your doctor will get a copy of your results.  Is it okay to keep smoking now that there s a cancer screening exam?  No. Tobacco is one of the strongest cancer-causing agents. It causes not only lung cancer, but other cancers and cardiovascular (heart) diseases as well. The damage  caused by smoking builds over time. This means that the longer you smoke, the higher your risk of disease. While it is never too late to quit, the sooner you quit, the better.  Where can I find help to quit smoking?  The best way to prevent lung cancer is to stop smoking. If you have already quit smoking, congratulations and keep it up! For help on quitting smoking, please call [facility] at 3-643-QUITNOW (1-540.411.6696) or the [employer] at 1-821.693.3198 to find local resources near you.  One-on-one health coaching:  If you d prefer to work individually with a health care provider on tobacco cessation, we offer:      Medication Therapy Management:  Our specially trained pharmacists work closely with you and your doctor to help you quit smoking.  Call [phone] or [phone] (toll free).

## 2023-08-29 NOTE — PROGRESS NOTES
Lung Cancer Screening Shared Decision Making Visit     Riana Cui, a 62 year old female, is eligible for lung cancer screening    History   Smoking Status     Every Day     Packs/day: 0.50     Years: 34.00     Types: Cigarettes   Smokeless Tobacco     Never       I have discussed with patient the risks and benefits of screening for lung cancer with low-dose CT.     The risks include:    radiation exposure: one low dose chest CT has as much ionizing radiation as about 15 chest x-rays, or 6 months of background radiation living in [place]      false positives: most findings/nodules are NOT cancer, but some might still require additional diagnostic evaluation, including biopsy    over-diagnosis: some slow growing cancers that might never have been clinically significant will be detected and treated unnecessarily     The benefit of early detection of lung cancer is contingent upon adherence to annual screening or more frequent follow up if indicated.     Furthermore, to benefit from screening, Riana must be willing and able to undergo diagnostic procedures, if indicated. Although no specific guide is available for determining severity of comorbidities, it is reasonable to withhold screening in patients who have greater mortality risk from other diseases.     We did discuss that the best way to prevent lung cancer is to not smoke.    Some patients may value a numeric estimation of lung cancer risk when evaluating if lung cancer screening is right for them, here is one calculator:    ShouldIScreen

## 2023-08-30 LAB — DEPRECATED CALCIDIOL+CALCIFEROL SERPL-MC: 19 UG/L (ref 20–75)

## 2023-09-02 LAB
ANNOTATION COMMENT IMP: NORMAL
RETINYL PALMITATE SERPL-MCNC: 0.02 MG/L
VIT A SERPL-MCNC: 0.81 MG/L
VIT B1 SERPL-SCNC: 4 NMOL/L
ZINC SERPL-MCNC: 69.4 UG/DL

## 2023-09-15 ENCOUNTER — IMMUNIZATION (OUTPATIENT)
Dept: FAMILY MEDICINE | Facility: CLINIC | Age: 63
End: 2023-09-15
Payer: COMMERCIAL

## 2023-09-15 PROCEDURE — 90471 IMMUNIZATION ADMIN: CPT

## 2023-09-15 PROCEDURE — 90682 RIV4 VACC RECOMBINANT DNA IM: CPT

## 2023-10-20 ENCOUNTER — HOSPITAL ENCOUNTER (OUTPATIENT)
Dept: CT IMAGING | Facility: CLINIC | Age: 63
Discharge: HOME | End: 2023-10-20
Attending: FAMILY MEDICINE | Admitting: FAMILY MEDICINE
Payer: COMMERCIAL

## 2023-10-20 DIAGNOSIS — Z87.891 PERSONAL HISTORY OF TOBACCO USE: ICD-10-CM

## 2023-10-20 PROCEDURE — 71271 CT THORAX LUNG CANCER SCR C-: CPT

## 2023-10-31 ENCOUNTER — OFFICE VISIT (OUTPATIENT)
Dept: FAMILY MEDICINE | Facility: CLINIC | Age: 63
End: 2023-10-31
Payer: COMMERCIAL

## 2023-10-31 VITALS
SYSTOLIC BLOOD PRESSURE: 137 MMHG | WEIGHT: 279 LBS | RESPIRATION RATE: 16 BRPM | BODY MASS INDEX: 49.43 KG/M2 | HEART RATE: 47 BPM | HEIGHT: 63 IN | OXYGEN SATURATION: 98 % | DIASTOLIC BLOOD PRESSURE: 75 MMHG

## 2023-10-31 DIAGNOSIS — M17.12 PRIMARY OSTEOARTHRITIS OF LEFT KNEE: ICD-10-CM

## 2023-10-31 DIAGNOSIS — E66.01 MORBID OBESITY (H): Primary | ICD-10-CM

## 2023-10-31 DIAGNOSIS — Z98.84 BARIATRIC SURGERY STATUS: ICD-10-CM

## 2023-10-31 DIAGNOSIS — E78.00 HYPERCHOLESTEREMIA: ICD-10-CM

## 2023-10-31 DIAGNOSIS — I80.02 THROMBOPHLEBITIS OF SUPERFICIAL VEINS OF LEFT LOWER EXTREMITY: ICD-10-CM

## 2023-10-31 DIAGNOSIS — F41.1 ANXIETY STATE: ICD-10-CM

## 2023-10-31 DIAGNOSIS — Z72.0 TOBACCO USE: ICD-10-CM

## 2023-10-31 PROCEDURE — 99215 OFFICE O/P EST HI 40 MIN: CPT | Performed by: FAMILY MEDICINE

## 2023-10-31 RX ORDER — BUPROPION HYDROCHLORIDE 150 MG/1
150 TABLET ORAL EVERY MORNING
Qty: 90 TABLET | Refills: 1 | Status: SHIPPED | OUTPATIENT
Start: 2023-10-31 | End: 2024-01-18 | Stop reason: SINTOL

## 2023-10-31 NOTE — PROGRESS NOTES
"    New Medical Weight Management Consult    PATIENT:  Riana Cui  MRN:         [id number]  :         1960  THANIA:         10/31/2023        I had the pleasure of seeing Riana Cui. Full intake/assessment was done to determine barriers to weight loss success and develop a treatment plan. Riana Cui is a 62 year old female interested in treatment of medical problems associated with excess weight. She has a height of 5' 2.5\", a weight of 279 lbs 0 oz, and the calculated Body mass index is 50.22 kg/m .    ASSESSMENT/PLAN:  1. Morbid obesity (H)  Riana comes in today for comprehensive weight management intake.  Her comorbidities include hyperlipidemia, sleep apnea, DVT, and osteoarthritis.  She was able to set some long-term and short-term goals as discussed below.  She had previous Andrew-en-Y surgery about 15 years ago.  Her biggest barrier to weight loss is motivation.  We did discuss medications today.  As she is trying to quit smoking and decrease her soda intake, I think Wellbutrin would be a good option.  I am having her wean down on her Paxil as she has been on this for years and may be contributing to weight gain.  She will then follow-up with me in about 6 weeks.  At that time could consider adding topiramate if she still having a hard time breaking her soda addiction.  - buPROPion (WELLBUTRIN XL) 150 MG 24 hr tablet; Take 1 tablet (150 mg) by mouth every morning  Dispense: 90 tablet; Refill: 1    2. Post-gastric Bypass For Obesity  15 years ago.    3. Tobacco use  She is planning on trying to quit smoking.    4. Primary osteoarthritis of left knee  She is following with orthopedist and getting knee injections.    5. Anxiety  We are trying to wean her down off the Paxil.  She is now on 20 mg and feels like her anxiety still okay.    6. Hypercholesteremia      7. Thrombophlebitis of superficial veins of left lower extremity  She is on Eliquis at least for another 3 " months.      Riana has a overall long-term goal of losing about 30 pounds and getting down to 150 pounds.  She is planning on likely having a knee replacement next fall and she like to lose some weight before that.  She would like to be more mobile and be able to play with her grandchildren.  She was able to set short-term goals in the following categories:  Behavioral: She is not doing a lot of disordered eating but will try to decrease the grazing throughout the day.  Nutritional: Her first goal is to try to decrease her soda intake.  She has been drinking 6-8 Mountain Dew's per day and has cut it down to around 2 or 3.  She will also focus on having a high-protein meal or snack for lunch.  She will really monitor her meals to make sure she is getting adequate protein.  Activity: She is quite sedentary now and limited by knee pain.  We will have her take several breaks during the day and just walk the halls in her basement to get a little bit of activity.  She may look into joining the community center or why as something she and her  could do together.    Riana is one of my own patients that I have seen for years.  She is at the point that she wants to start working on her weight.  She knows she has to have knee surgery and also complications will be last if she is lost some weight.  Her history is that she is always struggled with her weight.  She had a Andrew-en-Y gastric bypass about 15 years ago.  She states that her maximum weight prior to surgery was 385.  After surgery, she had a sherita of 250 and kept it off for quite some time.  It then just slowly increased and now she is up to 280.  She would like to be back down to the 250 kaelyn.  She admits she is really not following the nutritional advice that she got 15 years ago.  Her nutritional intake is as follows:  She typically skips breakfast and lunch.  If she does have a lunch is a handful of chips.  She starts out her day with the soda and had  "been drinking anywhere from 6-8 sodas per day.  Dinner then she is over hungry and tends to overeat.  Tends to be somewhat Midwestern meat and potatoes, pastas, meat loaf, etc..  Snacks at night were chocolate.  She is trying to cut back on this.  She occasionally drinks on the weekend when they go up to the cabin.  She is also planning on trying to quit smoking.  Her activity is quite limited.  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  She goes into the office once a week otherwise she is home.  She is really not doing much in the way of physical activity.  Part of this is from her knee pain.  She feels like motivation is her biggest barrier.  She feels like wanting to be able to be with her grandchildren is probably her biggest motivator.  She denies binge eating or disordered eating.  She is only sleeping 6 hours per night.  She states she had been diagnosed with sleep apnea in the past and then she was retested after she lost the weight.  She does not want to wear CPAP.  We discussed she likely has sleep apnea which may be another barrier to weight loss but she does not want to do a sleep study at this point.    She has the following co-morbidities:        10/31/2023    11:05 AM   --   I have the following health issues associated with obesity High Cholesterol   I have the following symptoms associated with obesity Knee Pain    Depression    Lower Extremity Swelling    Fatigue           10/31/2023    11:05 AM   Patient Goals   If yes, please indicate which surgery? Knee           10/31/2023    11:05 AM   Referring Provider   Please name the provider who referred you to Medical Weight Management  If you do not know, please answer \"I Don't Know\" Dr Hagen           10/31/2023    11:05 AM   Weight History   How concerned are you about your weight? Very Concerned   I became overweight As a Child   The following factors have contributed to my weight gain Eating Wrong Types of Food    Lack of Exercise    Genetic (Runs " in the Family)    Stress   My lowest weight since age 20 was 145   My highest weight since age 20 was 345   The most weight I have ever lost was (lbs) 135   I have the following family history of obesity/being overweight My mother is overweight    One or more of my siblings are overweight    Many of my relatives are overweight   How has your weight changed over the last year? Gained           10/31/2023    11:05 AM   Diet Recall Review with Patient   If you do eat supper, what types of food do you typically eat? beef   If you do snack, what types of food do you typically eat? chips   How many glasses of juice do you drink in a typical day? 0   How many of glasses of milk do you drink in a typical day? 0   How many 8oz glasses of sugar containing drinks such as Phil-Aid/sweet tea do you drink in a day? 0   How many cans/bottles of sugar pop/soda/tea/sports drinks do you drink in a day? 3   How many cans/bottles of diet pop/soda/tea or sports drink do you drink in a day? 0   How often do you have a drink of alcohol? Monthly or Less   If you do drink, how many drinks might you have in a day? 1 or 2           10/31/2023    11:05 AM   Eating Habits   Generally, my meals include foods like these bread, pasta, rice, potatoes, corn, crackers, sweet dessert, pop, or juice Everyday   Eat at a buffet or sit-down restaurant Never   Eat most of my meals in front of the TV or computer A Few Times a Week   Often skip meals, eat at random times, have no regular eating times A Few Times a Week   Rarely sit down for a meal but snack or graze throughout Everyday   Eat extra snacks between meals Everyday   Eat most of my food at the end of the day A Few Times a Week   Eat in the middle of the night or wake up at night to eat Never   Eat extra snacks to prevent or correct low blood sugar Never   Eat to prevent acid reflux or stomach pain Never   Worry about not having enough food to eat Never   I eat when I am depressed Once a Week   I  finish all the food on my plate even if I am already full Once a Week   I can't resist eating delicious food or walk past the good food/smell A Few Times a Week   I eat/snack without noticing that I am eating A Few Times a Week   I eat when I am preparing the meal Almost Everyday   I eat more than usual when I see others eating Never   I have trouble not eating sweets, ice cream, cookies, or chips if they are around the house Almost Everyday   I think about food all day A Few Times a Week   What foods, if any, do you crave? Chips/Crackers           10/31/2023    11:05 AM   Amount of Food   I feel out of control when eating Never   I eat a large amount of food, like a loaf of bread, a box of cookies, a pint/quart of ice cream, all at once Never   I eat a large amount of food even when I am not hungry Monthly   I eat rapidly Almost Everyday   I eat alone because I feel embarrassed and do not want others to see how much I have eaten Never   I eat until I am uncomfortably full Never   I feel bad, disgusted, or guilty after I overeat Weekly           10/31/2023    11:05 AM   Activity/Exercise History   How much of a typical 12 hour day do you spend sitting? Most of the Day   How much of a typical 12 hour day do you spend lying down? Less Than Half the Day   How much of a typical day do you spend walking/standing? Less Than Half the Day   How many hours (not including work) do you spend on the TV/Video Games/Computer/Tablet/Phone? 2-3 Hours   How many times a week are you active for the purpose of exercise? Never   What keeps you from being more active? Pain    Too tired    Worried People Will Look At Me       PAST MEDICAL HISTORY:  Past Medical History:   Diagnosis Date    Anaphylactic reaction 06/10/2015    to ABX, toungue swelling    Angioedema 06/10/2015    Anxiety     Anxiety     Created by Conversion  Replacement Utility updated for latest IMO load       Bladder disorder     Cellulitis of left lower extremity  "09/08/2022    Colitis 08/12/2014    Depression 12/18/2009    Hypercholesteremia 12/18/2009    Obesity     PONV (postoperative nausea and vomiting)     Post-gastric Bypass For Obesity     Created by Conversion       Symptomatic varicose veins of both lower extremities 06/18/2015    Tobacco use 08/12/2014           10/31/2023    11:05 AM   Work/Social History Reviewed With Patient   My employment status is Full-Time   My job is Clerical   How much of your job is spent on the computer or phone? 100%   How many hours do you spend commuting to work daily? 40   What is your marital status? /In a Relationship   If in a relationship, is your significant other overweight? Yes   If you have children, are they overweight? Yes   Who do you live with?    Who does the food shopping? me           10/31/2023    11:05 AM   Mental Health History Reviewed With Patient   Have you ever been physically or sexually abused? No   How often in the past 2 weeks have you felt little interest or pleasure in doing things? More Than Half the Days   Over the past 2 weeks how often have you felt down, depressed, or hopeless? Not at all           10/31/2023    11:05 AM   Sleep History Reviewed With Patient   How many hours do you sleep at night? 6       MEDICATIONS:   Current Outpatient Medications   Medication Sig Dispense Refill    apixaban ANTICOAGULANT (ELIQUIS) 5 MG tablet Take 1 tablet (5 mg) by mouth 2 times daily 60 tablet 3    PARoxetine (PAXIL) 40 MG tablet TAKE 1 TABLET(40 MG) BY MOUTH EVERY MORNING (Patient taking differently: Take 20 mg by mouth every morning) 90 tablet 1       ALLERGIES:   Allergies   Allergen Reactions    Ibuprofen Anaphylaxis    Shellfish-Derived Products Anaphylaxis    Rofecoxib      Other reaction(s): Stomach pain, rash       PHYSICAL EXAM:  /75   Pulse (!) 47   Resp 16   Ht 1.588 m (5' 2.5\")   Wt 126.6 kg (279 lb)   LMP  (LMP Unknown)   SpO2 98%   BMI 50.22 kg/m      Waist circumference: "      Wt Readings from Last 4 Encounters:   10/31/23 126.6 kg (279 lb)   08/29/23 124.5 kg (274 lb 6.4 oz)   11/21/22 122.6 kg (270 lb 3.2 oz)   09/26/22 121.5 kg (267 lb 12.8 oz)     A & O x 3  HEENT: NCAT, mucous membranes moist  Respirations unlabored  Location of obesity: Mixed Obesity    FOLLOW-UP:   8 weeks.    TIME: 65 min spent on evaluation, management, counseling, education, & motivational interviewing with greater than 50 % of the total time was spent on counseling and coordinating care    Sincerely,    Roxy Hagen MD

## 2023-12-07 ENCOUNTER — MYC MEDICAL ADVICE (OUTPATIENT)
Dept: FAMILY MEDICINE | Facility: CLINIC | Age: 63
End: 2023-12-07
Payer: COMMERCIAL

## 2023-12-07 DIAGNOSIS — F41.1 ANXIETY STATE: Primary | ICD-10-CM

## 2023-12-11 RX ORDER — FLUOXETINE 10 MG/1
10 CAPSULE ORAL DAILY
Qty: 30 CAPSULE | Refills: 1 | Status: SHIPPED | OUTPATIENT
Start: 2023-12-11 | End: 2024-01-18

## 2024-01-17 ENCOUNTER — OFFICE VISIT (OUTPATIENT)
Dept: VASCULAR SURGERY | Facility: CLINIC | Age: 64
End: 2024-01-17
Attending: SPECIALIST
Payer: COMMERCIAL

## 2024-01-17 ENCOUNTER — ANCILLARY PROCEDURE (OUTPATIENT)
Dept: VASCULAR ULTRASOUND | Facility: CLINIC | Age: 64
End: 2024-01-17
Attending: SPECIALIST
Payer: COMMERCIAL

## 2024-01-17 VITALS — RESPIRATION RATE: 18 BRPM | HEART RATE: 45 BPM | OXYGEN SATURATION: 99 %

## 2024-01-17 DIAGNOSIS — I82.412 ACUTE DEEP VEIN THROMBOSIS (DVT) OF FEMORAL VEIN OF LEFT LOWER EXTREMITY (H): ICD-10-CM

## 2024-01-17 DIAGNOSIS — I83.893 SYMPTOMATIC VARICOSE VEINS OF BOTH LOWER EXTREMITIES: Primary | ICD-10-CM

## 2024-01-17 PROCEDURE — 93971 EXTREMITY STUDY: CPT | Mod: LT

## 2024-01-17 PROCEDURE — 99213 OFFICE O/P EST LOW 20 MIN: CPT | Performed by: SPECIALIST

## 2024-01-17 PROCEDURE — 99213 OFFICE O/P EST LOW 20 MIN: CPT | Mod: 25 | Performed by: SPECIALIST

## 2024-01-17 ASSESSMENT — PATIENT HEALTH QUESTIONNAIRE - PHQ9
10. IF YOU CHECKED OFF ANY PROBLEMS, HOW DIFFICULT HAVE THESE PROBLEMS MADE IT FOR YOU TO DO YOUR WORK, TAKE CARE OF THINGS AT HOME, OR GET ALONG WITH OTHER PEOPLE: SOMEWHAT DIFFICULT
SUM OF ALL RESPONSES TO PHQ QUESTIONS 1-9: 11
SUM OF ALL RESPONSES TO PHQ QUESTIONS 1-9: 11

## 2024-01-17 NOTE — PROGRESS NOTES
[facility]        Patient is here for a 3 month follow up  to discuss varicose vein(s). The patient has varicose veins that are problematic in bilateral legs. Symptoms patient has been experiencing are  cramping at night if yin than usual activity . Patient states their varicose veins are bothersome when standing and sitting. Patient has not been using pain medication or anti-inflammatory's. Patient has had recent imaging on legs done. Patient has done conservative measures which include: compression stockings and weight loss. Treatment has been successful. Educated patient to work on conservative treatments.      Pt is currently taking Eliquis.    There were no vitals taken for this visit.    The provider has been notified that the patient has no concerns.     Questions patient would like addressed today are: N/A.    Refills are needed: Yes:  Eliquis    Has homecare services and agency name:  No

## 2024-01-17 NOTE — PATIENT INSTRUCTIONS
Pre-Procedure Instructions for Varicose Vein Ablation   We look forward to scheduling a varicose vein procedure for you. First, we will submit a prior authorization to your insurance company if required. This typically takes 10-14 days. We will contact you once we have gotten the approval to schedule the procedure.  The following is helpful information for you regarding your treatment:  **Important: A  will be needed post procedure.  (Unable to use Taxi or Uber).  Please allow 1-2 hours for your vein procedure appointment.  Take your routine medications as you normally would except for blood thinners. Aspirin is ok to continue.  If you take Warfarin, Xarelto, or Eliquis this will need to be HELD prior to procedure according to primary care provider or cardiology who prescribes this medication. Please notify us if you take this medication. Eliquis will need to be held for 3 days before the procedure.   We have thigh high compression stocking sizes medium to X-large that will be applied immediately after the procedure. You will need to provide your own if one of these sizes will not fit. Another option is to bring in knee high compression and biker compression shorts.   Please wear comfortable clothing.  We recommend that you bring a change of undergarment in case it gets stained by the cleansing solution.  Feel free to bring a personal music player or a CD to listen to during your procedure.  It is advised not to fly within 3 weeks after the procedure.  You do not have to fast prior to this procedure.  For any questions regarding your procedure please call [phone] to speak with the nurse.  If you would like a Good [place] Estimate for your upcoming procedure contact Cost of Care Estimates at [phone], advocates are available Monday through Friday 8am - 4:30pm.    Radiofrequency Ablation Codes:   - 14904 for first vein in either leg  Varicose veins may be a sign of something more severe - venous reflux  disease.  Healthy leg veins have valves that keep blood flowing to the heart. Venous reflux disease develops when the valves stop working properly and allow blood to flow backward (i.e., reflux) and pool in the lower leg veins.   If venous reflux disease is left untreated, symptoms can worsen over time. Your doctor can help you understand if you have this condition.     Superficial venous reflux disease may cause the following signs and symptoms in your legs:  Varicose veins  Aching  Swelling  Cramping  Heaviness or tiredness  Itching  Restlessness  Open skin sores    Superficial venous reflux disease treatment aims to reduce or stop the backward flow of blood. The following may be prescribed to treat your superficial venous reflux disease. Your doctor can help you decide which treatment is best for you:   Compression stockings   Removing diseased vein   Closing diseased vein (through thermal or non-thermal treatment)     Radiofrequency Ablation (RFA) Treatment for Varicose Veins  Radiofrequency ablation (RFA) is a thermal procedure to treat varicose veins. It uses heat created from radiofrequency (RF). During RFA treatment, RF heat is sent into your vein through a thin, flexible tube (catheter). This closes off blood flow in the main problem vein.           Getting ready for your treatment  Tell your provider if you:  Are pregnant or think you may be pregnant  Are breastfeeding  Smoke, use alcohol or street drugs on a regular basis  Have any allergies or intolerances to certain medicines. Explain what reaction you have had to these medicines in the past.      The day of your treatment  The treatment takes 45 to 60 minutes. The entire treatment (including time to prepare and recover) takes about 1 to 3 hours. You can go home the same day. For the treatment:   You'll lie down on a hospital bed.  An imaging method, such as ultrasound, is used to guide the procedure.  The leg to be treated is injected with numbing  medicine.  Once your leg is numb, a needle makes a small hole (puncture) in the vein to be treated.  The catheter with the RF heat source is inserted into your vein.  More numbing medicine may be injected around your vein.  Once the catheter is in the right position, it is then slowly drawn backward. As the catheter sends out heat, the vein is closed off.  In some cases, other side branch varicose veins may be removed or tied off through a few small cuts (incisions).  When the treatment is done, the catheter is removed. Pressure is applied to the insertion site to stop any bleeding. An elastic compression stocking or a bandage may then be put on your leg.       Recovering at home  Once at home, follow all the instructions you've been given. Be sure to:  Check for signs of infection at the catheter insertion sites (see below)  Wear thigh high compressions as directed  Keep your legs raised (elevated) as directed  Walk a few times a day  Avoid heavy exercise, lifting, and standing for long periods as advised. No lifting >20lbs for 2 weeks.   Avoid air travel, hot baths, saunas, or whirlpools as advised  Do not drive or operate heavy machinery for 24 hours after the procedure  Leakage from the numbing medications the first few hours is normal.          Call your healthcare provider if you have any of the following:  Fever of 100.4 F (38 C) or higher, or as directed by your provider  Chest pain or trouble breathing  Signs of infection at the catheter insertion site. These include increased redness or swelling (inflammation), warmth, increasing pain, bleeding, or bad-smelling discharge.  Severe numbness or tingling in the treated leg  Severe pain or swelling in the treated leg      Follow-up  You'll have an ultrasound within the same week as the procedure to check for problems, such as blood clots. You will follow up with the provider after 6 weeks.   Risks and possible complications   These include the  following:  Bleeding, Infection, Blood clots, Damage to the nerves in the treated area, Irritation or burning of the skin over the treated vein. Treatment doesn't improve the look or the symptoms of the problem veins  Risks of any medicines used during the treatment

## 2024-01-17 NOTE — PROGRESS NOTES
Follow Up: Varicose Veins/ Venous Insufficiency    Riana Cui is a 63 year old  female here for followup. She has worn stockings now for 3          months. I saw them previously in 2023.  Continued progression of disease and symptoms and issues; reviewed ultrasound results. Patient has ongoing symptoms with pain and swelling needing intervention with pain meds secondary to interfering with daily activities and work. This now inhibits daily chores and activities.     Allergies:   Allergies   Allergen Reactions    Ibuprofen Anaphylaxis    Shellfish-Derived Products Anaphylaxis    Rofecoxib      Other reaction(s): Stomach pain, rash        Past Medical History:   Past Medical History:   Diagnosis Date    Anaphylactic reaction 06/10/2015    to ABX, toungue swelling    Angioedema 06/10/2015    Anxiety     Anxiety     Created by Conversion  Replacement Utility updated for latest IMO load       Bladder disorder     Cellulitis of left lower extremity 2022    Colitis 2014    Depression 2009    Hypercholesteremia 2009    Obesity     PONV (postoperative nausea and vomiting)     Post-gastric Bypass For Obesity     Created by Conversion       Symptomatic varicose veins of both lower extremities 2015    Tobacco use 2014        Past Social History:   Past Surgical History:   Procedure Laterality Date    ABDOMEN SURGERY       SECTION      CHOLECYSTECTOMY      COLONOSCOPY W/ BIOPSIES N/A 6/3/2021    Procedure: Colonoscopy with polypectomies;  Surgeon: Cruzito Field MD;  Location: [facility] OR;  Service: Gastroenterology    GASTRIC BYPASS      HC KNEE SCOPE,SINGLE MENISECTOMY Left 2015    Procedure: LEFT KNEE ARTHROSCOPY , PARTIAL MEDIAL AND LATERAL MENISECTOMY;  Surgeon: Gato Quiñones MD;  Location: [facility] OR;  Service: Orthopedics    HERNIA REPAIR, UMBILICAL      X 3        CURRENT MEDS:  Current Outpatient Medications   Medication     apixaban ANTICOAGULANT (ELIQUIS) 5 MG tablet    FLUoxetine (PROZAC) 10 MG capsule    buPROPion (WELLBUTRIN XL) 150 MG 24 hr tablet    PARoxetine (PAXIL) 40 MG tablet     No current facility-administered medications for this visit.       Family History   Problem Relation Age of Onset    Heart Disease Mother     Coronary Artery Disease Father     Cancer Brother     Coronary Artery Disease Brother     Cancer Brother         reports that she has been smoking cigarettes. She has a 17 pack-year smoking history. She has never used smokeless tobacco. She reports that she does not drink alcohol and does not use drugs.    Review of Systems:  Negative except progression of disease and issues while wearing compression and conservative management.   Patient has symptomatic veins and changes of left legs. These have progressed to the point of causing symptoms on a daily basis. This causes issues with daily activities and chores such as washing dishes, vacuuming, outdoor upkeep, and standing for long lengths of time. She has worn compression now for greater than 3 months, worked on an exercise and weight loss program and continues to have symptoms.     OBJECTIVE:  There were no vitals filed for this visit.  There is no height or weight on file to calculate BMI.    EXAM:  GENERAL: This is a well-developed 63 year old female who appears her stated age  HEAD: normocephalic  HEENT: Pupils equal and reactive bilaterally  CARDIAC: RRR without murmur  CHEST/LUNG:  Clear to auscultation  ABDOMEN: Soft, nontender, nondistended, no masses    NEUROLOGIC: Focally intact, nonfocal  VASCULAR: Pulses intact, symmetrical upper and lower extremities. Varicose veins, Spider veins, Thrombophlebitis, and Chronic venous stasis changes, left             Imaging:           Reviewed ultrasound showing that she has a left anterior accessory saphenous vein which could be closable or amenable to closure.  I think to be a good option for her most of the  dilated varicosities or branches off of this.    Exam Information    Exam Date Exam Time Accession # Performing Department Results    1/17/24  9:27 AM [id number] [facility] [place]      PACS Images     Show images for US Venous Competency Left     Study Result    Narrative & Impression   LEFT Venous Insufficiency Ultrasound (Date: 01/17/24)    LEFT Lower Extremity          Indication:  Surveillance Left Leg Symptomatic Varicose Veins, Pain, and Swelling. Hx: Left Popliteal Vein DVT 5/23/2023. Left GSV/ AASV RFA ablation done 2/15/2023.      Previous: None, US Left Leg Venous 8/8/23 ( Residual Thrombus Popliteal Vein)      Patient History: Patient History: Swelling, Vein Ablation, DVT, Anticoagulants, and Obesity     Presenting Symptoms:  Swelling, Varicose Veins, and Pain     Technique:   Supine and Upright Ultrasound of the Deep and Superficial Veins with Valsalva and Compression Augmentation Maneuvers. Duplex Imaging is performed utilizing gray-scale, Two-dimensional images, color-flow imaging, Doppler waveform analysis, and Spectral doppler imaging done with provacative maneuvers.      Incompetency Criteria:  Deep vein reflux reported when greater than 1000 msec flow reversal. Superficial vein reflux reported when equal to or greater than 600 msec flow reversal.  vein reflux reported as greater than 350 msec flow reversal.     Left Leg  Deep System      Location CFV SFJ PFV PROX SFV PROX SFV MID SFV DIST POP V. PERON. V. PTV'S   Compressibility  (FC,PC,NC) FC FC FC FC FC FC FC FC FC   Reflux -   - - + - -   -      Left Leg  Superficial System       Location SFJ PROX THIGH KNEE MID CALF SPJ PROX CALF MID CALF   GSV (mm) RFA RFA RFA  5.2         Reflux NC NC NC PC         SSV (mm)         4.4 4.7 4.1   Reflux         - - -      Location SFJ PROX THIGH MID THIGH   AASV (mm) 6.8 9.1 8.8   Reflux  + + +      Comments: LEFT incompetent  veins visualized  at GSV Distal Calf ( 7.4 mm/  799 ms) Left AASV is straight enough to ablate from SFJ to Distal Thigh. Residual Thrombus Present.      Impression:     Reference:     Compressibility: FC= Fully compressible, PC= Partially compressible, NC= Non-compressible, NV= Not Visualized     Reflux: (+) Incompetent  (-) Competent, (NV) = Not Visualized     Interpretation criteria:          Duration of Retrograde flow (milliseconds)  Category Deep Veins Superficial Veins  veins   Competent < 1000ms < 500ms < 350ms   Incompetent > 1000ms > 500ms > 350ms          Assessment/Plan:    She has  incompetency and insufficiency of the Left  Accessory  saphenous vein.  Left anterior accessory saphenous vein measures 6.8 mm at the junction. Deep systems are intact. No DVTs. Great candidate for endovenous  closure. We spent 20 today and discussed the procedure. The risks of anesthesia, infection, bleeding, clotting, DVTs, numbess at the insertion site dermatome and  the process and procedure were discussed. Answered all questions today. Will submit this to Riana Cui's insurance if needed for pre approval.Will set this up when approved. Discussed need to have a  and procedure woul take around 30 minutes with total time 2-3 hours. Also understands a small screening ultrasound 2-3 days out to rule out clot formation at the closed vessel.    DIAGNOSIS: Venous insufficiency of the left anterior accessory saphenous vein      PROCEDURE: Endovenous closure of the  left anterior accessory vein    Armando Hobson MD  General Surgery [phone]  Vascular Surgery [phone]

## 2024-01-18 ENCOUNTER — OFFICE VISIT (OUTPATIENT)
Dept: FAMILY MEDICINE | Facility: CLINIC | Age: 64
End: 2024-01-18
Payer: COMMERCIAL

## 2024-01-18 VITALS
HEART RATE: 57 BPM | OXYGEN SATURATION: 98 % | RESPIRATION RATE: 18 BRPM | HEIGHT: 63 IN | DIASTOLIC BLOOD PRESSURE: 75 MMHG | BODY MASS INDEX: 48.3 KG/M2 | SYSTOLIC BLOOD PRESSURE: 134 MMHG | WEIGHT: 272.6 LBS

## 2024-01-18 DIAGNOSIS — F41.1 ANXIETY STATE: Primary | ICD-10-CM

## 2024-01-18 DIAGNOSIS — F32.2 CURRENT SEVERE EPISODE OF MAJOR DEPRESSIVE DISORDER WITHOUT PSYCHOTIC FEATURES WITHOUT PRIOR EPISODE (H): ICD-10-CM

## 2024-01-18 PROCEDURE — 99214 OFFICE O/P EST MOD 30 MIN: CPT | Performed by: FAMILY MEDICINE

## 2024-01-18 NOTE — PROGRESS NOTES
Assessment/ Plan     1. Current severe episode of major depressive disorder without psychotic features without prior episode (H)  Riana is having an episode of major depression.  I think this is probably a combination of switching her medications and some stressors that she is having at work.  I had recently weaned her off the Paxil that she is going to work on weight loss.  She had suicidal thoughts with the Wellbutrin so stopped taking it.  She is now on fluoxetine 10 mg and we will have her increase to 20 mg.  She is seeing a therapist on a regular basis.  I will fill out [facility] paperwork for her to have intermittent leave over the next 6 months.  She will let me know how things are feeling over the next 4 weeks.    2. Anxiety state    - FLUoxetine (PROZAC) 20 MG capsule; Take 1 capsule (20 mg) by mouth daily  Dispense: 90 capsule; Refill: 1      Subjective:      Riana Cui is a 63 year old female who presents for comprehensive weight management follow-up, but she is struggling with some mental health issues.  I last saw her in October for weight loss and I wanted her to wean off her Paxil if she had been on it for many years and this can cause weight gain.  She thinks she had started it maybe for anxiety many years ago.  As she started weaning down on it, she started to have some anxiety and some mild depression so we talked about starting Wellbutrin to help with weight loss, depression, and quitting smoking.  She states she had suicidal thoughts on that so she stopped taking it.  I had her start fluoxetine and she is now on 10 mg for the last 2 weeks.  She does feel like it is helping a little bit.  She is having some major stressors at work with her immediate boss and some bullying and she is finding it really stressful.  She is wondering if she could have some [facility] paperwork done so that when things get bad at work, she can take the day off.  She is seeing a therapist.  She states she is not suicidal  "and she would not ever do anything.    Relevant past medical, family, surgical, and social history reviewed with patient, unless noted in HPI, not pertinent for this visit.  Medications were discussed and reconciled.   Review of Systems   A 12 point comprehensive review of systems was negative except as noted.      Current Outpatient Medications   Medication Sig Dispense Refill    apixaban ANTICOAGULANT (ELIQUIS) 5 MG tablet Take 1 tablet (5 mg) by mouth 2 times daily for 120 days 60 tablet 3    FLUoxetine (PROZAC) 20 MG capsule Take 1 capsule (20 mg) by mouth daily 90 capsule 1         Objective:     /75   Pulse 57   Resp 18   Ht 1.588 m (5' 2.5\")   Wt 123.7 kg (272 lb 9.6 oz)   LMP  (LMP Unknown)   SpO2 98%   BMI 49.06 kg/m      Body mass index is 49.06 kg/m .       General appearance: alert, appears stated age and cooperative  Good eye contact, no social smile, very tearful         No results found for this or any previous visit (from the past 168 hour(s)).       This note has been dictated using voice recognition software. Any grammatical or context distortions are unintentional and inherent to the software  "

## 2024-01-29 ENCOUNTER — TELEPHONE (OUTPATIENT)
Dept: VASCULAR SURGERY | Facility: CLINIC | Age: 64
End: 2024-01-29
Payer: COMMERCIAL

## 2024-01-29 NOTE — TELEPHONE ENCOUNTER
Riana,    Your visit to [facility] for your procedure is coming soon and we look forward to seeing you! This friendly reminder and pre-procedure checklist will help to ensure your procedure/surgery goes smoothly and meets your expectations. At [facility], our goal is to provide you with a great patient experience and to deliver genuine, professional care to every patient.     Please complete all the steps in advance of your visit. If you have any questions about the items listed below, please give our office a call. We can be reached at [phone] or visit our website at [email or web address]  for more information.     Procedure: Radiofrequency ablation of the left anterior accessory saphenous vein of the left lower extremity.     Surgeon: MD Armando Hobson    Procedure Date : Tuesday 2/13/24    Pro[place] [address] [place] Phone: [phone], Fax [phone]    Procedure Time :  12:30 pm    Arrival Time: 12:15 pm     Post Procedure Appointment: Ultrasound 2/15/24 and follow-up 3/27/24.    It is OKAY to eat and drink prior to vein procedure.     There is NO Pre-Op or COVID test needed for Radiofrequency Ablation or Endoseal procedure.       IF YOU NEED TO RESCHEDULE OR CANCEL YOUR PROCEDURE FOR ANY REASON PLEASE CALL THE CLINIC AS SOON AS POSSIBLE -[phone].      Complete the following checklist before your procedure/surgery      [] Contact your insurance regarding coverage. We have received approval from [facility] [place] . If your insurance has changed please notify us. Be aware this doesn't mean you are covered at 100%.   Please check with your insurance for coverage and your out of pocket .  If you would like a Good [place] Estimate for your upcoming service/procedure at [facility] contact Cost of Care Estimates at [phone], advocates  are available Monday through Friday 8am - 5pm.   You may also submit a request online through your [employer] account.     [] Pre-Operative Medication Instructions  Contact your prescribing provider for instructions before discontinuing any medications including anticoagulants. (Examples: Coumadin, Heparin, Xarelto, Eliquis, Plavix, Pradaxa, Effiient, Briliant) We would like you stop them 3-5 days before surgery, depending on the medicaion. HOWEVER, please follow the advice of your primary care provider. Most surgeons will have you remain on your aspirin.      Dr. Hobson would like you Eliquis held for 3 days before your procedure. Stop taking on 2/10/24.       Day of Procedure & After-care    [] Arrange for a family member or friend to drive you home. (If having Endoseal, it is ok to drive home yourself if you wish)    [] Please arrive 15 minutes early prior to arrival time to use the restroom and sign consent.     [] Please wear loose-fitting comfortable clothing.     [] For in clinic procedure (Radiofrequency ablation or Endoseal procedure) bring your own thigh-high compression or compression shorts and your knee high stockings if you think you may not fit into our M-XL thigh-high compression socks. You will wear the thigh-high stockings for 2 nights continuously after the procedure, then wear thigh-high stockings for 2 weeks after but taking off at bedtime, and then you can transition into compression stockings of your choice. You should then continue to wear your compression as much as possible.     [] You will not be able to fly for 3 weeks, no vigorous activity for 2 weeks, and no lifting over 20 lbs for 2 weeks.         Notify our office right away, if you have any changes in your health status, or if you develop a cold, flu, diarrhea, infection, fever or sore throat before your scheduled surgery date. We can be reached at [phone] Monday-Friday 8 am-4:30 pm if you have any questions.

## 2024-02-13 ENCOUNTER — OFFICE VISIT (OUTPATIENT)
Dept: VASCULAR ULTRASOUND | Facility: CLINIC | Age: 64
End: 2024-02-13
Attending: SPECIALIST
Payer: COMMERCIAL

## 2024-02-13 VITALS
HEART RATE: 64 BPM | SYSTOLIC BLOOD PRESSURE: 139 MMHG | RESPIRATION RATE: 12 BRPM | TEMPERATURE: 97.8 F | DIASTOLIC BLOOD PRESSURE: 52 MMHG

## 2024-02-13 DIAGNOSIS — I83.893 SYMPTOMATIC VARICOSE VEINS OF BOTH LOWER EXTREMITIES: ICD-10-CM

## 2024-02-13 PROCEDURE — 36475 ENDOVENOUS RF 1ST VEIN: CPT | Mod: LT | Performed by: SPECIALIST

## 2024-02-13 PROCEDURE — 250N000011 HC RX IP 250 OP 636: Performed by: SPECIALIST

## 2024-02-13 PROCEDURE — C1769 GUIDE WIRE: HCPCS

## 2024-02-13 PROCEDURE — 258N000003 HC RX IP 258 OP 636: Performed by: SPECIALIST

## 2024-02-13 PROCEDURE — 250N000009 HC RX 250: Performed by: SPECIALIST

## 2024-02-13 RX ORDER — LIDOCAINE HYDROCHLORIDE 20 MG/ML
5 INJECTION, SOLUTION INFILTRATION; PERINEURAL ONCE
Status: COMPLETED | OUTPATIENT
Start: 2024-02-13 | End: 2024-02-13

## 2024-02-13 RX ADMIN — LIDOCAINE HYDROCHLORIDE: 10 INJECTION, SOLUTION INFILTRATION; PERINEURAL at 13:06

## 2024-02-13 RX ADMIN — LIDOCAINE HYDROCHLORIDE 5 ML: 20 INJECTION, SOLUTION INFILTRATION; PERINEURAL at 13:08

## 2024-02-13 ASSESSMENT — PAIN SCALES - GENERAL: PAINLEVEL: NO PAIN (1)

## 2024-02-13 NOTE — PATIENT INSTRUCTIONS
Discharge Instructions Following Venous Closure  Today, you had this procedure done:   - Vein closure using RadioFrequency Ablation (RFA)    Dressing  Leakage from the numbing medications the first few hours is normal if you had an RFA.   Wear your thigh high compression for 48 hours continuously until your ultrasound after the procedure.  It is ok to remove your stocking to shower in 24 hours but then reapply after.  Wear the thigh high stocking for two weeks after the procedure while you are up. It is ok to take off when you sleep.   After two weeks, you can transition into compression stockings of your choice.     Activity  On the day of the procedure, make sure to walk around the house for a few minutes each hour until bedtime.  The day after the procedure you should advance activity as tolerated.  NO strenuous activities though for 2 weeks.  In general, avoid prolonged standing in place and sitting with your legs down.  Both activities will cause blood to pool in your legs resulting in more swelling and discomfort.  Do not drive or operate heavy machinery for 24 hours after the procedure (excludes if you had a VenaSeal).   Do not take baths or use hot tubs or swimming pools until your incision site is healed.  Do not fly for the next 3 weeks.  Do not lift > 20 lbs. for 2 weeks.     Post Procedure Ultrasound & Follow-up  You will need an ultrasound within 2-3 days following the closure procedure.  Then plan to see your surgeon in the office six weeks after your procedure. Call us to schedule this appointment if one has not already been made.  Post Procedure Signs and Symptoms  There can be a mild amount of bruising and numbness after this procedure.  This will typically take a few weeks to fade.  You may feel pain or tenderness in your inner thigh.  Mild pain medication such as Tylenol or Ibuprofen maybe helpful.  It is normal to have fluid leaking from the injection areas the day of the procedure and it may be  blood tinged.      Call your healthcare provider if you have any of the following:  Fever of 100.4 F (38 C) or higher, or as directed by your provider  Chest pain or trouble breathing  Signs of infection at the catheter insertion site. These include increased redness or swelling (inflammation), warmth, increasing pain, bleeding, or bad-smelling discharge.  Severe numbness or tingling in the treated leg  Severe pain or swelling in the treated leg  For emergencies after 4:30pm Monday - Friday, holidays and weekends:  Dr. Armando Hobson, [facility] at [phone]  Dr. Lu Hull, [facility] at [phone]  Thank you for allowing us to be part of your care

## 2024-02-15 ENCOUNTER — ANCILLARY PROCEDURE (OUTPATIENT)
Dept: VASCULAR ULTRASOUND | Facility: CLINIC | Age: 64
End: 2024-02-15
Attending: SPECIALIST
Payer: COMMERCIAL

## 2024-02-15 ENCOUNTER — TELEPHONE (OUTPATIENT)
Dept: VASCULAR SURGERY | Facility: CLINIC | Age: 64
End: 2024-02-15
Payer: COMMERCIAL

## 2024-02-15 DIAGNOSIS — I83.893 SYMPTOMATIC VARICOSE VEINS OF BOTH LOWER EXTREMITIES: ICD-10-CM

## 2024-02-15 PROCEDURE — 93971 EXTREMITY STUDY: CPT | Mod: LT

## 2024-02-15 PROCEDURE — 93971 EXTREMITY STUDY: CPT | Mod: 26 | Performed by: SURGERY

## 2024-02-15 NOTE — LETTER
February 15, 2024      RE: Riana Cui  [address] [address]       To whom it may concern:    Riana Cui was seen in our clinic on 2/13. She may return to work with the following: restrictions and working from home from 2/13-2/27. She can return to work on 2/28 with no restrictions.     Sincerely,      Armando Hobson MD

## 2024-02-15 NOTE — TELEPHONE ENCOUNTER
Pt called and stated that she needs a different letter for her employer to allow her to work from home from 2/12-2/27. Writer will complete and pt will have access to it on my chart.

## 2024-03-06 NOTE — PATIENT INSTRUCTIONS
"Tiffanie Mayers,    Thank you for entrusting your care with us today. After your visit today with MD Armando Hobson this is the plan that was discussed at your appointment.    Dr. Hobson would like to see you in person in about 4 months.   Dr Hobson advised to stop Eliquis.      I am including additional information on these things and our contact information if you have any questions or concerns.   Please do not hesitate to reach out to us if you felt we did not answer your questions or you are unsure of the treatment plan after your visit today. Our number is [phone].Thank you for trusting us with your care.       Again thank you for your time.       We would like you to follow up in about 4 months. You can resume your normal activities. It is important to remember that venous reflux disease is a life-long disease, and you should wear compression stockings as much as you can. They do not need to be worn at night while in bed. It is especially important to wear compression with long periods of sitting, standing, long car rides or if you will be flying. Compression socks should get refilled every 4-6 months. If you do a lot of standing it is good to do calf raises to help keep the blood pumping. If you sit a lot at work, it is good to get up periodically to walk around. Elevation of the foot of your bed 4-6\" helps the blood return back to where it is needed. We can refill your compression prescription for 1 year otherwise your primary care provider is able to refill them for you. Please call us sooner if you are having any concerns or problems prior to your follow up. Below is a list of places you may go to get your compression stockings.      [facility]  [address] [place] [address]  Phone: [phone]  Fax: [phone] [facility].  [address] [place] [address]  Phone: " [phone]  Fax: [phone]   [facility]  [address] [place] [address]  Phone: [phone]  Fax: [phone] [facility]  [address] [place] [address]  Phone: [phone]  Fax: [phone]   [facility]  [address] [place] [address]  Phone: [phone] [facility]   [address] ([facility])  [place] [address]  Phone: [phone]   [place]  [address] [place] [address]      Phone: [phone]  Fax: [phone] [place]  [address] [place] [address]      Phone: [phone]  Fax: [phone]     [facility] [email or web address]  [address], [place] [address]  [phone]    [facility]  [email or web address]  [address] [place] [address]  Phone: [phone]     [address] [place] [address]  Phone: [phone]    [address] [place] [address]  Phone: [phone] [address], [place] [address]  Phone: [phone] [address], [place] [address]  Phone: [phone]    [facility] [email or web address]  [address] [place] [address]  Phone: [phone]    [address], [place] [address]  Phone: [phone]    [address], [place] [address]  Phone: [phone]    Mick Ramos  [email or web address]  [phone]

## 2024-03-12 ENCOUNTER — E-VISIT (OUTPATIENT)
Dept: FAMILY MEDICINE | Facility: CLINIC | Age: 64
End: 2024-03-12
Payer: COMMERCIAL

## 2024-03-12 DIAGNOSIS — F32.2 CURRENT SEVERE EPISODE OF MAJOR DEPRESSIVE DISORDER WITHOUT PSYCHOTIC FEATURES WITHOUT PRIOR EPISODE (H): Primary | ICD-10-CM

## 2024-03-12 PROCEDURE — 99207 PR NON-BILLABLE SERV PER CHARTING: CPT | Performed by: FAMILY MEDICINE

## 2024-03-27 ENCOUNTER — OFFICE VISIT (OUTPATIENT)
Dept: VASCULAR SURGERY | Facility: CLINIC | Age: 64
End: 2024-03-27
Attending: SPECIALIST
Payer: COMMERCIAL

## 2024-03-27 VITALS
SYSTOLIC BLOOD PRESSURE: 155 MMHG | HEART RATE: 58 BPM | RESPIRATION RATE: 12 BRPM | DIASTOLIC BLOOD PRESSURE: 86 MMHG | TEMPERATURE: 97.4 F

## 2024-03-27 DIAGNOSIS — I83.893 SYMPTOMATIC VARICOSE VEINS OF BOTH LOWER EXTREMITIES: Primary | ICD-10-CM

## 2024-03-27 PROCEDURE — 99214 OFFICE O/P EST MOD 30 MIN: CPT | Performed by: SPECIALIST

## 2024-03-27 PROCEDURE — 99212 OFFICE O/P EST SF 10 MIN: CPT | Performed by: SPECIALIST

## 2024-03-27 ASSESSMENT — PAIN SCALES - GENERAL: PAINLEVEL: NO PAIN (0)

## 2024-03-27 NOTE — PROGRESS NOTES
Post Procedure Note Endovenous Closure    S: Riana Cui is a 63 year old female S/P Left  acessory saphenous vein leg endovenous closure of  lower ext. 6 weeks out from procedure. Doing well, wore female  thigh high socks. Minimal discomfort. Some superficial phlibitis. Which is resolving.     O:   Vitals:    03/27/24 0830   BP: (!) 155/86   Pulse: 58   Resp: 12   Temp: 97.4  F (36.3  C)       Status: doing well    General: no apparent distress  Legs look good no signs of infection, incisions healing nicely.      Side:: Left  VCSS  PAIN:: Absent: None  Varicose Veins:: Mild: Few scattered  Venous Edema:: Severe: Extensive: Morning swelling above ankle and requiring activity change/elevation  Skin Pigmentation:: Absent: None  Inflamation:: Absent: None  Induration:: Absent: None  Number of active ulcers:: 0  Active ulcer duration:: None  Active ulcer diameter:: None  Compression Therapy:: Full compliance stockings + elevation  VCSS Score:: 7  CEAP:: Ankle edema of venous origin (not foot edema)    Imaging:    Exam Information    Exam Date Exam Time Accession # Performing Department Results    2/15/24  8:01 AM [id number] [facility]      PACS Images     Show images for US Venous Post Ablation Leg Left     Study Result    Narrative & Impression   Left Venous Ultrasound Status Post Radiofrequency Ablation (Date: 02/15/24)        Indication: Follow-up saphenous vein Radiofrequency ablation.           Hx: Left GSV/ AASV RFA done 2/15/2023,              Left Popliteal Vein DVT (5/23/2023).               Residual Thrombus seen on 1/17/24 US Left Venous Competency Study. Meds: Eliquis     Date of Procedure: Left 2/13/2024       Left CFV/SFJ Compression:  Fully Compressible        Reference:   (FC)-Fully Compressible           (PC)-Partially Compressible   (NC) Non-Compressible     Location Left AASV   Proximal Thigh NC   Mid Thigh NC   Distal Thigh NC          Comments: No Residual Thrombus seen in the Popliteal Vein today. Fully compressible.      Impression: Noncompressible left anterior accessory saphenous vein consistent with recent radiofrequency ablation procedure.  No evidence of DVT       A/P: S/p endovenous closure. For insuffiencey of veins   Stop eliquis  May switch to knee high support socks   Resume all activities   RTC 4 months   Call for any questions or concerns     Armando Hobson MD   [facility]

## 2024-04-30 ENCOUNTER — MYC MEDICAL ADVICE (OUTPATIENT)
Dept: FAMILY MEDICINE | Facility: CLINIC | Age: 64
End: 2024-04-30
Payer: COMMERCIAL

## 2024-04-30 DIAGNOSIS — M17.0 PRIMARY OSTEOARTHRITIS OF BOTH KNEES: Primary | ICD-10-CM

## 2024-05-16 ENCOUNTER — TRANSFERRED RECORDS (OUTPATIENT)
Dept: HEALTH INFORMATION MANAGEMENT | Facility: CLINIC | Age: 64
End: 2024-05-16
Payer: COMMERCIAL

## 2024-07-12 DIAGNOSIS — F41.1 ANXIETY STATE: ICD-10-CM

## 2024-07-30 ENCOUNTER — PATIENT OUTREACH (OUTPATIENT)
Dept: CARE COORDINATION | Facility: CLINIC | Age: 64
End: 2024-07-30
Payer: COMMERCIAL

## 2024-08-13 ENCOUNTER — PATIENT OUTREACH (OUTPATIENT)
Dept: CARE COORDINATION | Facility: CLINIC | Age: 64
End: 2024-08-13
Payer: COMMERCIAL

## 2024-09-04 ASSESSMENT — ANXIETY QUESTIONNAIRES
GAD7 TOTAL SCORE: 8
GAD7 TOTAL SCORE: 8
7. FEELING AFRAID AS IF SOMETHING AWFUL MIGHT HAPPEN: MORE THAN HALF THE DAYS
8. IF YOU CHECKED OFF ANY PROBLEMS, HOW DIFFICULT HAVE THESE MADE IT FOR YOU TO DO YOUR WORK, TAKE CARE OF THINGS AT HOME, OR GET ALONG WITH OTHER PEOPLE?: SOMEWHAT DIFFICULT

## 2024-09-09 ENCOUNTER — OFFICE VISIT (OUTPATIENT)
Dept: FAMILY MEDICINE | Facility: CLINIC | Age: 64
End: 2024-09-09
Payer: COMMERCIAL

## 2024-09-09 ENCOUNTER — MYC MEDICAL ADVICE (OUTPATIENT)
Dept: FAMILY MEDICINE | Facility: CLINIC | Age: 64
End: 2024-09-09

## 2024-09-09 VITALS
HEART RATE: 54 BPM | BODY MASS INDEX: 44.93 KG/M2 | OXYGEN SATURATION: 94 % | DIASTOLIC BLOOD PRESSURE: 79 MMHG | SYSTOLIC BLOOD PRESSURE: 128 MMHG | WEIGHT: 253.6 LBS | HEIGHT: 63 IN | RESPIRATION RATE: 18 BRPM | TEMPERATURE: 97.7 F

## 2024-09-09 DIAGNOSIS — E78.00 HYPERCHOLESTEREMIA: Primary | ICD-10-CM

## 2024-09-09 DIAGNOSIS — F41.1 ANXIETY STATE: ICD-10-CM

## 2024-09-09 DIAGNOSIS — E66.813 CLASS 3 SEVERE OBESITY WITH SERIOUS COMORBIDITY AND BODY MASS INDEX (BMI) OF 40.0 TO 44.9 IN ADULT, UNSPECIFIED OBESITY TYPE (H): ICD-10-CM

## 2024-09-09 DIAGNOSIS — Z87.891 PERSONAL HISTORY OF TOBACCO USE: ICD-10-CM

## 2024-09-09 DIAGNOSIS — Z00.00 ROUTINE GENERAL MEDICAL EXAMINATION AT A HEALTH CARE FACILITY: Primary | ICD-10-CM

## 2024-09-09 DIAGNOSIS — F33.0 MILD EPISODE OF RECURRENT MAJOR DEPRESSIVE DISORDER (H): ICD-10-CM

## 2024-09-09 DIAGNOSIS — Z98.84 BARIATRIC SURGERY STATUS: ICD-10-CM

## 2024-09-09 DIAGNOSIS — E66.01 CLASS 3 SEVERE OBESITY WITH SERIOUS COMORBIDITY AND BODY MASS INDEX (BMI) OF 40.0 TO 44.9 IN ADULT, UNSPECIFIED OBESITY TYPE (H): ICD-10-CM

## 2024-09-09 DIAGNOSIS — E78.00 HYPERCHOLESTEREMIA: ICD-10-CM

## 2024-09-09 PROBLEM — F33.9 MAJOR DEPRESSION, RECURRENT (H): Status: ACTIVE | Noted: 2024-09-09

## 2024-09-09 LAB
ALBUMIN SERPL BCG-MCNC: 3.8 G/DL (ref 3.5–5.2)
ALP SERPL-CCNC: 88 U/L (ref 40–150)
ALT SERPL W P-5'-P-CCNC: 7 U/L (ref 0–50)
ANION GAP SERPL CALCULATED.3IONS-SCNC: 9 MMOL/L (ref 7–15)
AST SERPL W P-5'-P-CCNC: 18 U/L (ref 0–45)
BILIRUB SERPL-MCNC: 0.3 MG/DL
BUN SERPL-MCNC: 13.1 MG/DL (ref 8–23)
CALCIUM SERPL-MCNC: 9.3 MG/DL (ref 8.8–10.4)
CHLORIDE SERPL-SCNC: 103 MMOL/L (ref 98–107)
CHOLEST SERPL-MCNC: 251 MG/DL
CREAT SERPL-MCNC: 0.86 MG/DL (ref 0.51–0.95)
EGFRCR SERPLBLD CKD-EPI 2021: 76 ML/MIN/1.73M2
ERYTHROCYTE [DISTWIDTH] IN BLOOD BY AUTOMATED COUNT: 13.4 % (ref 10–15)
FASTING STATUS PATIENT QL REPORTED: YES
FASTING STATUS PATIENT QL REPORTED: YES
FERRITIN SERPL-MCNC: 34 NG/ML (ref 11–328)
GLUCOSE SERPL-MCNC: 93 MG/DL (ref 70–99)
HBA1C MFR BLD: 5.7 % (ref 0–5.6)
HCO3 SERPL-SCNC: 27 MMOL/L (ref 22–29)
HCT VFR BLD AUTO: 44.1 % (ref 35–47)
HDLC SERPL-MCNC: 52 MG/DL
HGB BLD-MCNC: 14.1 G/DL (ref 11.7–15.7)
LDLC SERPL CALC-MCNC: 175 MG/DL
MCH RBC QN AUTO: 28.5 PG (ref 26.5–33)
MCHC RBC AUTO-ENTMCNC: 32 G/DL (ref 31.5–36.5)
MCV RBC AUTO: 89 FL (ref 78–100)
NONHDLC SERPL-MCNC: 199 MG/DL
PLATELET # BLD AUTO: 277 10E3/UL (ref 150–450)
POTASSIUM SERPL-SCNC: 4.1 MMOL/L (ref 3.4–5.3)
PROT SERPL-MCNC: 7 G/DL (ref 6.4–8.3)
PTH-INTACT SERPL-MCNC: 65 PG/ML (ref 15–65)
RBC # BLD AUTO: 4.95 10E6/UL (ref 3.8–5.2)
SODIUM SERPL-SCNC: 139 MMOL/L (ref 135–145)
TRIGL SERPL-MCNC: 118 MG/DL
TSH SERPL DL<=0.005 MIU/L-ACNC: 2.39 UIU/ML (ref 0.3–4.2)
VIT B12 SERPL-MCNC: 266 PG/ML (ref 232–1245)
VIT D+METAB SERPL-MCNC: 25 NG/ML (ref 20–50)
WBC # BLD AUTO: 5.5 10E3/UL (ref 4–11)

## 2024-09-09 PROCEDURE — 82607 VITAMIN B-12: CPT | Performed by: FAMILY MEDICINE

## 2024-09-09 PROCEDURE — 90673 RIV3 VACCINE NO PRESERV IM: CPT | Performed by: FAMILY MEDICINE

## 2024-09-09 PROCEDURE — 83036 HEMOGLOBIN GLYCOSYLATED A1C: CPT | Performed by: FAMILY MEDICINE

## 2024-09-09 PROCEDURE — 84443 ASSAY THYROID STIM HORMONE: CPT | Performed by: FAMILY MEDICINE

## 2024-09-09 PROCEDURE — 82306 VITAMIN D 25 HYDROXY: CPT | Performed by: FAMILY MEDICINE

## 2024-09-09 PROCEDURE — G0296 VISIT TO DETERM LDCT ELIG: HCPCS | Performed by: FAMILY MEDICINE

## 2024-09-09 PROCEDURE — 85027 COMPLETE CBC AUTOMATED: CPT | Performed by: FAMILY MEDICINE

## 2024-09-09 PROCEDURE — 99214 OFFICE O/P EST MOD 30 MIN: CPT | Mod: 25 | Performed by: FAMILY MEDICINE

## 2024-09-09 PROCEDURE — 83970 ASSAY OF PARATHORMONE: CPT | Performed by: FAMILY MEDICINE

## 2024-09-09 PROCEDURE — 80053 COMPREHEN METABOLIC PANEL: CPT | Performed by: FAMILY MEDICINE

## 2024-09-09 PROCEDURE — 36415 COLL VENOUS BLD VENIPUNCTURE: CPT | Performed by: FAMILY MEDICINE

## 2024-09-09 PROCEDURE — 82728 ASSAY OF FERRITIN: CPT | Performed by: FAMILY MEDICINE

## 2024-09-09 PROCEDURE — 99396 PREV VISIT EST AGE 40-64: CPT | Mod: 25 | Performed by: FAMILY MEDICINE

## 2024-09-09 PROCEDURE — 96127 BRIEF EMOTIONAL/BEHAV ASSMT: CPT | Performed by: FAMILY MEDICINE

## 2024-09-09 PROCEDURE — 80061 LIPID PANEL: CPT | Performed by: FAMILY MEDICINE

## 2024-09-09 PROCEDURE — 90471 IMMUNIZATION ADMIN: CPT | Performed by: FAMILY MEDICINE

## 2024-09-09 RX ORDER — FLUOXETINE 40 MG/1
40 CAPSULE ORAL DAILY
Qty: 90 CAPSULE | Refills: 3 | Status: SHIPPED | OUTPATIENT
Start: 2024-09-09

## 2024-09-09 ASSESSMENT — ANXIETY QUESTIONNAIRES
GAD7 TOTAL SCORE: 10
GAD7 TOTAL SCORE: 10
7. FEELING AFRAID AS IF SOMETHING AWFUL MIGHT HAPPEN: NOT AT ALL
8. IF YOU CHECKED OFF ANY PROBLEMS, HOW DIFFICULT HAVE THESE MADE IT FOR YOU TO DO YOUR WORK, TAKE CARE OF THINGS AT HOME, OR GET ALONG WITH OTHER PEOPLE?: SOMEWHAT DIFFICULT
GAD7 TOTAL SCORE: 10

## 2024-09-09 ASSESSMENT — PATIENT HEALTH QUESTIONNAIRE - PHQ9
SUM OF ALL RESPONSES TO PHQ QUESTIONS 1-9: 13
10. IF YOU CHECKED OFF ANY PROBLEMS, HOW DIFFICULT HAVE THESE PROBLEMS MADE IT FOR YOU TO DO YOUR WORK, TAKE CARE OF THINGS AT HOME, OR GET ALONG WITH OTHER PEOPLE: SOMEWHAT DIFFICULT
SUM OF ALL RESPONSES TO PHQ QUESTIONS 1-9: 13

## 2024-09-09 NOTE — PROGRESS NOTES
Lung Cancer Screening Shared Decision Making Visit     Riana Cui, a 63 year old female, is eligible for lung cancer screening    History   Smoking Status     Every Day     Packs/day: 0.50     Years: 34.00     Types: Cigarettes   Smokeless Tobacco     Never       I have discussed with patient the risks and benefits of screening for lung cancer with low-dose CT.     The risks include:    radiation exposure: one low dose chest CT has as much ionizing radiation as about 15 chest x-rays, or 6 months of background radiation living in [place]      false positives: most findings/nodules are NOT cancer, but some might still require additional diagnostic evaluation, including biopsy    over-diagnosis: some slow growing cancers that might never have been clinically significant will be detected and treated unnecessarily     The benefit of early detection of lung cancer is contingent upon adherence to annual screening or more frequent follow up if indicated.     Furthermore, to benefit from screening, Riana must be willing and able to undergo diagnostic procedures, if indicated. Although no specific guide is available for determining severity of comorbidities, it is reasonable to withhold screening in patients who have greater mortality risk from other diseases.     We did discuss that the best way to prevent lung cancer is to not smoke.    Some patients may value a numeric estimation of lung cancer risk when evaluating if lung cancer screening is right for them, here is one calculator:    ShouldIScreen

## 2024-09-09 NOTE — PATIENT INSTRUCTIONS
Check insurance for   [employer]  Zep[employer]      Patient Education   Preventive Care Advice   This is general advice given by our system to help you stay healthy. However, your care team may have specific advice just for you. Please talk to your care team about your preventive care needs.  Nutrition  Eat 5 or more servings of fruits and vegetables each day.  Try wheat bread, brown rice and whole grain pasta (instead of white bread, rice, and pasta).  Get enough calcium and vitamin D. Check the label on foods and aim for 100% of the RDA (recommended daily allowance).  Lifestyle  Exercise at least 150 minutes each week  (30 minutes a day, 5 days a week).  Do muscle strengthening activities 2 days a week. These help control your weight and prevent disease.  No smoking.  Wear sunscreen to prevent skin cancer.  Have a dental exam and cleaning every 6 months.  Yearly exams  See your health care team every year to talk about:  Any changes in your health.  Any medicines your care team has prescribed.  Preventive care, family planning, and ways to prevent chronic diseases.  Shots (vaccines)   HPV shots (up to age 26), if you've never had them before.  Hepatitis B shots (up to age 59), if you've never had them before.  COVID-19 shot: Get this shot when it's due.  Flu shot: Get a flu shot every year.  Tetanus shot: Get a tetanus shot every 10 years.  Pneumococcal, hepatitis A, and RSV shots: Ask your care team if you need these based on your risk.  Shingles shot (for age 50 and up)  General health tests  Diabetes screening:  Starting at age 35, Get screened for diabetes at least every 3 years.  If you are younger than age 35, ask your care team if you should be screened for diabetes.  Cholesterol test: At age 39, start having a cholesterol test every 5 years, or more often if advised.  Bone density scan (DEXA): At age 50, ask your care team if you should have this scan for osteoporosis (brittle bones).  Hepatitis C: Get tested at  least once in your life.  STIs (sexually transmitted infections)  Before age 24: Ask your care team if you should be screened for STIs.  After age 24: Get screened for STIs if you're at risk. You are at risk for STIs (including HIV) if:  You are sexually active with more than one person.  You don't use condoms every time.  You or a partner was diagnosed with a sexually transmitted infection.  If you are at risk for HIV, ask about PrEP medicine to prevent HIV.  Get tested for HIV at least once in your life, whether you are at risk for HIV or not.  Cancer screening tests  Cervical cancer screening: If you have a cervix, begin getting regular cervical cancer screening tests starting at age 21.  Breast cancer scan (mammogram): If you've ever had breasts, begin having regular mammograms starting at age 40. This is a scan to check for breast cancer.  Colon cancer screening: It is important to start screening for colon cancer at age 45.  Have a colonoscopy test every 10 years (or more often if you're at risk) Or, ask your provider about stool tests like a FIT test every year or Cologuard test every 3 years.  To learn more about your testing options, visit:   .  For help making a decision, visit:   https://bit.ly/pp04237.  Prostate cancer screening test: If you have a prostate, ask your care team if a prostate cancer screening test (PSA) at age 55 is right for you.  Lung cancer screening: If you are a current or former smoker ages 50 to 80, ask your care team if ongoing lung cancer screenings are right for you.  For informational purposes only. Not to replace the advice of your health care provider. Copyright   2023 [facility]. All rights reserved. Clinically reviewed by the [facility]. memloom [id number] - REV 01/24.  Learning About Depression Screening  What is depression screening?  Depression screening is a way to see if you have depression symptoms. It may be done by a doctor or  "counselor. It's often part of a routine checkup. That's because your mental health is just as important as your physical health.  Depression is a mental health condition that affects how you feel, think, and act. You may:  Have less energy.  Lose interest in your daily activities.  Feel sad and grouchy for a long time.  Depression is very common. It affects people of all ages.  Many things can lead to depression. Some people become depressed after they have a stroke or find out they have a major illness like cancer or heart disease. The death of a loved one or a breakup may lead to depression. It can run in families. Most experts believe that a combination of inherited genes and stressful life events can cause it.  What happens during screening?  You may be asked to fill out a form about your depression symptoms. You and the doctor will discuss your answers. The doctor may ask you more questions to learn more about how you think, act, and feel.  What happens after screening?  If you have symptoms of depression, your doctor will talk to you about your options.  Doctors usually treat depression with medicines or counseling. Often, combining the two works best. Many people don't get help because they think that they'll get over the depression on their own. But people with depression may not get better unless they get treatment.  The cause of depression is not well understood. There may be many factors involved. But if you have depression, it's not your fault.  A serious symptom of depression is thinking about death or suicide. If you or someone you care about talks about this or about feeling hopeless, get help right away.  It's important to know that depression can be treated. Medicine, counseling, and self-care may help.  Where can you learn more?  Go to https://www.healthwise.net/patiented  Enter T185 in the search box to learn more about \"Learning About Depression Screening.\"  Current as of: June 24, 2023  Content " Version: 14.1 2006-2024 [employer].   Care instructions adapted under license by your healthcare professional. If you have questions about a medical condition or this instruction, always ask your healthcare professional. [employer] disclaims any warranty or liability for your use of this information.       Lung Cancer Screening   Frequently Asked Questions  If you are at high-risk for lung cancer, getting screened with low-dose computed tomography (LDCT) every year can help save your life. This handout offers answers to some of the most common questions about lung cancer screening. If you have other questions, please call [phone] ([phone]).     What is it?  Lung cancer screening uses special X-ray technology to create an image of your lung tissue. The exam is quick and easy and takes less than 10 seconds. We don t give you any medicine or use any needles. You can eat before and after the exam. You don t need to change your clothes as long as the clothing on your chest doesn t contain metal. But, you do need to be able to hold your breath for at least 6 seconds during the exam.    What is the goal of lung cancer screening?  The goal of lung cancer screening is to save lives. Many times, lung cancer is not found until a person starts having physical symptoms. Lung cancer screening can help detect lung cancer in the earliest stages when it may be easier to treat.    Who should be screened for lung cancer?  We suggest lung cancer screening for anyone who is at high-risk for lung cancer. You are in the high-risk group if you:      are between the ages of 55 and 79, and    have smoked at least 1 pack of cigarettes a day for 20 or more years, and    still smoke or have quit within the past 15 years.    However, if you have a new cough or shortness of breath, you should talk to your doctor before being screened.    Why does it matter if I have symptoms?  Certain symptoms can  be a sign that you have a condition in your lungs that should be checked and treated by your doctor. These symptoms include fever, chest pain, a new or changing cough, shortness of breath that you have never felt before, coughing up blood or unexplained weight loss. Having any of these symptoms can greatly affect the results of lung cancer screening.       Should all smokers get an LDCT lung cancer screening exam?  It depends. Lung cancer screening is for a very specific group of men and women who have a history of heavy smoking over a long period of time (see  Who should be screened for lung cancer  above).  I am in the high-risk group, but have been diagnosed with cancer in the past. Is LDCT lung cancer screening right for me?  In some cases, you should not have LDCT lung screening, such as when your doctor is already following your cancer with CT scan studies. Your doctor will help you decide if LDCT lung screening is right for you.  Do I need to have a screening exam every year?  Yes. If you are in the high-risk group described earlier, you should get an LDCT lung cancer screening exam every year until you are 79, or are no longer willing or able to undergo screening and possible procedures to diagnose and treat lung cancer.  How effective is LDCT at preventing death from lung cancer?  Studies have shown that LDCT lung cancer screening can lower the risk of death from lung cancer by 20 percent in people who are at high-risk.  What are the risks?  There are some risks and limitations of LDCT lung cancer screening. We want to make sure you understand the risks and benefits, so please let us know if you have any questions. Your doctor may want to talk with you more about these risks.    Radiation exposure: As with any exam that uses radiation, there is a very small increased risk of cancer. The amount of radiation in LDCT is small--about the same amount a person would get from a mammogram. Your doctor orders the  exam when he or she feels the potential benefits outweigh the risks.    False negatives: No test is perfect, including LDCT. It is possible that you may have a medical condition, including lung cancer, that is not found during your exam. This is called a false negative result.    False positives and more testing: LDCT very often finds something in the lung that could be cancer, but in fact is not. This is called a false positive result. False positive tests often cause anxiety. To make sure these findings are not cancer, you may need to have more tests. These tests will be done only if you give us permission. Sometimes patients need a treatment that can have side effects, such as a biopsy. For more information on false positives, see  What can I expect from the results?     Findings not related to lung cancer: Your LDCT exam also takes pictures of areas of your body next to your lungs. In a very small number of cases, the CT scan will show an abnormal finding in one of these areas, such as your kidneys, adrenal glands, liver or thyroid. This finding may not be serious, but you may need more tests. Your doctor can help you decide what other tests you may need, if any.  What can I expect from the results?  About 1 out of 4 LDCT exams will find something that may need more tests. Most of the time, these findings are lung nodules. Lung nodules are very small collections of tissue in the lung. These nodules are very common, and the vast majority--more than 97 percent--are not cancer (benign). Most are normal lymph nodes or small areas of scarring from past infections.  But, if a small lung nodule is found to be cancer, the cancer can be cured more than 90 percent of the time. To know if the nodule is cancer, we may need to get more images before your next yearly screening exam. If the nodule has suspicious features (for example, it is large, has an odd shape or grows over time), we will refer you to a specialist for  further testing.  Will my doctor also get the results?  Yes. Your doctor will get a copy of your results.  Is it okay to keep smoking now that there s a cancer screening exam?  No. Tobacco is one of the strongest cancer-causing agents. It causes not only lung cancer, but other cancers and cardiovascular (heart) diseases as well. The damage caused by smoking builds over time. This means that the longer you smoke, the higher your risk of disease. While it is never too late to quit, the sooner you quit, the better.  Where can I find help to quit smoking?  The best way to prevent lung cancer is to stop smoking. If you have already quit smoking, congratulations and keep it up! For help on quitting smoking, please call [employer] at 3-746-QUITNOW (1-845.743.9645) or the [employer] at 1-334.736.2864 to find local resources near you.  One-on-one health coaching:  If you d prefer to work individually with a health care provider on tobacco cessation, we offer:      Medication Therapy Management:  Our specially trained pharmacists work closely with you and your doctor to help you quit smoking.  Call [phone] or [phone] (toll free).

## 2024-09-09 NOTE — PROGRESS NOTES
Preventive Care Visit  [facility]  Roxy Hagen MD, Family Medicine  Sep 9, 2024      1. Routine general medical examination at a health care facility  Riana comes in today for her annual wellness exam.  As far as healthcare maintenance, she is due for her mammogram and she will set that up.  She is due for her lung cancer screening in October so I ordered that today.  Her next colonoscopy is due in 2026 and her next Pap smear is due in 2025.  Will update her flu shot today.    2. Hypercholesteremia  She has had mildly elevated cholesterol in the past that we have been monitoring.  Will recheck today.  - Lipid panel reflex to direct LDL Non-fasting; Future  - Lipid panel reflex to direct LDL Non-fasting    3. Anxiety  She feels like her anxiety is maybe not quite under adequate control so we will increase the fluoxetine up to 40 mg.  We had weaned her off the Paxil which was pretty rough for her from a mental health standpoint but she feels like it has helped with her weight loss.  AUGUSTO-7 equals 10  - FLUoxetine (PROZAC) 40 MG capsule; Take 1 capsule (40 mg) by mouth daily.  Dispense: 90 capsule; Refill: 3    4. Mild episode of recurrent major depressive disorder (H24)  Her PHQ-9 is 13 but she is overall feeling better.  She would like to go up on the fluoxetine.  She is seeing a therapist and that is been helpful.    5. Post-gastric Bypass For Obesity  She is due for her bariatric labs.  Her maximum weight was around 380.  She has had some weight regain, but not to the degree where she started.  Will check her levels today.  - CBC with platelets; Future  - Comprehensive metabolic panel; Future  - TSH with free T4 reflex; Future  - Hemoglobin A1c; Future  - Ferritin; Future  - Parathyroid Hormone Intact; Future  - Vitamin B12; Future  - Vitamin D Deficiency; Future  - CBC with platelets  - Comprehensive metabolic panel  - TSH with free T4 reflex  - Hemoglobin A1c  - Ferritin  -  Parathyroid Hormone Intact  - Vitamin B12  - Vitamin D Deficiency    6. Class 3 severe obesity with serious comorbidity and body mass index (BMI) of 40.0 to 44.9 in adult, unspecified obesity type (H)  I congratulated her on her efforts.  When I saw her last she was at 275 and today she is at 253.  She is lost 20 pounds with switching over from Paxil to fluoxetine and giving up soda.  She is doing therapy and that is been helpful.  She is interested in either Wegovy or Zepbound and she will check with her insurance to see if it is covered.  We did discuss side effects, how to start this, how to wean out today.    7. Personal history of tobacco use  She will be due for her lung cancer screening in October.  - Prof fee: Shared Decision Making for Lung Cancer Screening  - CT Chest Lung Cancer Scrn Low Dose wo; Future      Subjective   Riana is a 63 year old, presenting for the following:  Physical (Fasting)        9/9/2024     7:22 AM   Additional Questions   Roomed by         Health Care Directive  Patient does not have a Health Care Directive or Living Will: Discussed advance care planning with patient; information given to patient to review.    Healthy Habits:     Taking medications regularly:  0  History of Present Illness       Mental Health Follow-up:  Patient presents to follow-up on Depression & Anxiety.Patient's depression since last visit has been:  Better  The patient is not having other symptoms associated with depression.  Patient's anxiety since last visit has been:  Good  The patient is not having other symptoms associated with anxiety.  Any significant life events: job concerns  Patient is feeling anxious or having panic attacks.  Patient has no concerns about alcohol or drug use.    Reason for visit:  Weight loss check up    She eats 2-3 servings of fruits and vegetables daily.She consumes 2 sweetened beverage(s) daily.She exercises with enough effort to increase her heart rate 9 or less minutes  per day.  She exercises with enough effort to increase her heart rate 3 or less days per week.   She is taking medications regularly.    She comes in today for her annual exam.  She is also following up on weight.  When I saw her last she was at 274 and today she is at 253.  I congratulated her on her 20 pound weight loss.  She is decreased her soda significantly down to maybe 1 a day or less.  We weaned her off the Paxil which was difficult for her and switch her over to fluoxetine.  She feels like this is help with the weight loss.  She is seeing a therapist and that is also been helpful for her.  She does feel like she is struggling a little bit still with her mood.  She is on 20 of fluoxetine and we discussed we could bump this up a little bit.  She is interested in possibly medication for weight loss.  She had a gastric bypass many years ago and is still about 100 pounds lighter but has had some weight regain.  We talked specifically about Wegovy and Zepbound and she is going to check with her insurance.  I reviewed with her potential side effects including nausea, reflux, diarrhea, or constipation.            9/9/2024   General Health   How would you rate your overall physical health? (!) FAIR   Feel stress (tense, anxious, or unable to sleep) To some extent      (!) STRESS CONCERN      9/9/2024   Nutrition   Three or more servings of calcium each day? Yes   Diet: Regular (no restrictions)   How many servings of fruit and vegetables per day? (!) 0-1   How many sweetened beverages each day? 0-1            9/9/2024   Exercise   Days per week of moderate/strenous exercise 0 days      (!) EXERCISE CONCERN      9/9/2024   Social Factors   Frequency of gathering with friends or relatives Once a week   Worry food won't last until get money to buy more No   Food not last or not have enough money for food? No   Do you have housing? (Housing is defined as stable permanent housing and does not include staying ouside in a  car, in a tent, in an abandoned building, in an overnight shelter, or couch-surfing.) Yes   Are you worried about losing your housing? No   Lack of transportation? No   Unable to get utilities (heat,electricity)? No            9/9/2024   Fall Risk   Fallen 2 or more times in the past year? No   Trouble with walking or balance? No             9/9/2024   Dental   Dentist two times every year? Yes            9/9/2024   TB Screening   Were you born outside of the [place]? No          Today's PHQ-9 Score:       9/9/2024     7:13 AM   PHQ-9 SCORE   PHQ-9 Total Score MyChart 13 (Moderate depression)   PHQ-9 Total Score 13         9/9/2024   Substance Use   Alcohol more than 3/day or more than 7/wk Not Applicable   Do you use any other substances recreationally? No        Social History     Tobacco Use    Smoking status: Every Day     Current packs/day: 0.50     Average packs/day: 0.5 packs/day for 34.0 years (17.0 ttl pk-yrs)     Types: Cigarettes    Smokeless tobacco: Never    Tobacco comments:     pt declined info   Vaping Use    Vaping status: Never Used   Substance Use Topics    Alcohol use: No    Drug use: No           7/7/2023   LAST FHS-7 RESULTS   1st degree relative breast or ovarian cancer No   Any relative bilateral breast cancer No   Any male have breast cancer No   Any ONE woman have BOTH breast AND ovarian cancer No   Any woman with breast cancer before 50yrs No   2 or more relatives with breast AND/OR ovarian cancer No   2 or more relatives with breast AND/OR bowel cancer No           Mammogram Screening - Mammogram every 1-2 years updated in Health Maintenance based on mutual decision making          9/9/2024   One time HIV Screening   Previous HIV test? Decline          9/9/2024   STI Screening   New sexual partner(s) since last STI/HIV test? No        History of abnormal Pap smear: No - age 30- 64 PAP with HPV every 5 years recommended        Latest Ref Rng & Units 8/20/2020     9:58 AM 9/3/2015     2:52 PM  "  PAP / HPV   PAP Negative for squamous intraepithelial lesion or malignancy. Negative for squamous intraepithelial lesion or malignancy  Electronically signed by Jesenia Simpson CT (ASCP) on 9/2/2020 at 11:21 AM    Negative for squamous intraepithelial lesion or malignancy  Electronically signed by Jesenia Simpson CT (ASCP) on 9/16/2015 at  9:18 AM      HPV 16 DNA NEG Negative     HPV 18 DNA NEG Negative     Other HR HPV NEG Negative       ASCVD Risk   The 10-year ASCVD risk score (Tiny POLANCO, et al., 2019) is: 8.1%    Values used to calculate the score:      Age: 63 years      Sex: Female      Is Non- : No      Diabetic: No      Tobacco smoker: Yes      Systolic Blood Pressure: 128 mmHg      Is BP treated: No      HDL Cholesterol: 68 mg/dL      Total Cholesterol: 272 mg/dL           Reviewed and updated as needed this visit by Provider                    Lab work is in process  Current Outpatient Medications   Medication Sig Dispense Refill    FLUoxetine (PROZAC) 40 MG capsule Take 1 capsule (40 mg) by mouth daily. 90 capsule 3         Review of Systems  Constitutional, HEENT, cardiovascular, pulmonary, gi and gu systems are negative, except as otherwise noted.     Objective    Exam  /79   Pulse 54   Temp 97.7  F (36.5  C)   Resp 18   Ht 1.6 m (5' 3\")   Wt 115 kg (253 lb 9.6 oz)   LMP  (LMP Unknown)   SpO2 94%   BMI 44.92 kg/m     Estimated body mass index is 44.92 kg/m  as calculated from the following:    Height as of this encounter: 1.6 m (5' 3\").    Weight as of this encounter: 115 kg (253 lb 9.6 oz).    Physical Exam  GENERAL: alert and no distress  EYES: Eyes grossly normal to inspection, PERRL and conjunctivae and sclerae normal  HENT: ear canals and TM's normal, nose and mouth without ulcers or lesions  NECK: no adenopathy, no asymmetry, masses, or scars  RESP: lungs clear to auscultation - no rales, rhonchi or wheezes  BREAST: normal without masses, " tenderness or nipple discharge and no palpable axillary masses or adenopathy  CV: regular rate and rhythm, normal S1 S2, no S3 or S4, no murmur, click or rub, no peripheral edema  ABDOMEN: soft, nontender, no hepatosplenomegaly, no masses and bowel sounds normal  MS: no gross musculoskeletal defects noted, no edema  SKIN: no suspicious lesions or rashes  NEURO: Normal strength and tone, mentation intact and speech normal  PSYCH: mentation appears normal, affect normal/bright        Signed Electronically by: Roxy Hagen MD

## 2024-09-10 RX ORDER — ATORVASTATIN CALCIUM 20 MG/1
20 TABLET, FILM COATED ORAL DAILY
Qty: 90 TABLET | Refills: 3 | Status: SHIPPED | OUTPATIENT
Start: 2024-09-10

## 2024-09-10 ASSESSMENT — ANXIETY QUESTIONNAIRES: GAD7 TOTAL SCORE: 10

## 2024-10-01 ENCOUNTER — MYC MEDICAL ADVICE (OUTPATIENT)
Dept: FAMILY MEDICINE | Facility: CLINIC | Age: 64
End: 2024-10-01
Payer: COMMERCIAL

## 2024-10-01 DIAGNOSIS — E66.01 CLASS 3 SEVERE OBESITY WITH SERIOUS COMORBIDITY AND BODY MASS INDEX (BMI) OF 40.0 TO 44.9 IN ADULT, UNSPECIFIED OBESITY TYPE (H): Primary | ICD-10-CM

## 2024-10-01 DIAGNOSIS — E66.813 CLASS 3 SEVERE OBESITY WITH SERIOUS COMORBIDITY AND BODY MASS INDEX (BMI) OF 40.0 TO 44.9 IN ADULT, UNSPECIFIED OBESITY TYPE (H): Primary | ICD-10-CM

## 2024-10-21 ENCOUNTER — HOSPITAL ENCOUNTER (OUTPATIENT)
Dept: CT IMAGING | Facility: CLINIC | Age: 64
Discharge: HOME | End: 2024-10-21
Attending: FAMILY MEDICINE | Admitting: FAMILY MEDICINE
Payer: COMMERCIAL

## 2024-10-21 DIAGNOSIS — Z87.891 PERSONAL HISTORY OF TOBACCO USE: ICD-10-CM

## 2024-10-21 PROCEDURE — 71271 CT THORAX LUNG CANCER SCR C-: CPT

## 2024-10-24 DIAGNOSIS — E66.01 CLASS 3 SEVERE OBESITY WITH SERIOUS COMORBIDITY AND BODY MASS INDEX (BMI) OF 40.0 TO 44.9 IN ADULT, UNSPECIFIED OBESITY TYPE (H): ICD-10-CM

## 2024-10-24 DIAGNOSIS — E66.813 CLASS 3 SEVERE OBESITY WITH SERIOUS COMORBIDITY AND BODY MASS INDEX (BMI) OF 40.0 TO 44.9 IN ADULT, UNSPECIFIED OBESITY TYPE (H): ICD-10-CM

## 2024-10-24 RX ORDER — TIRZEPATIDE 5 MG/.5ML
5 INJECTION, SOLUTION SUBCUTANEOUS
Qty: 2 ML | Refills: 0 | Status: SHIPPED | OUTPATIENT
Start: 2024-10-24 | End: 2024-10-30

## 2024-10-28 ENCOUNTER — TRANSFERRED RECORDS (OUTPATIENT)
Dept: HEALTH INFORMATION MANAGEMENT | Facility: CLINIC | Age: 64
End: 2024-10-28
Payer: COMMERCIAL

## 2024-10-29 DIAGNOSIS — E66.813 CLASS 3 SEVERE OBESITY WITH SERIOUS COMORBIDITY AND BODY MASS INDEX (BMI) OF 40.0 TO 44.9 IN ADULT, UNSPECIFIED OBESITY TYPE (H): ICD-10-CM

## 2024-10-29 DIAGNOSIS — E66.01 CLASS 3 SEVERE OBESITY WITH SERIOUS COMORBIDITY AND BODY MASS INDEX (BMI) OF 40.0 TO 44.9 IN ADULT, UNSPECIFIED OBESITY TYPE (H): ICD-10-CM

## 2024-10-30 RX ORDER — TIRZEPATIDE 7.5 MG/.5ML
7.5 INJECTION, SOLUTION SUBCUTANEOUS
Qty: 2 ML | Refills: 0 | Status: SHIPPED | OUTPATIENT
Start: 2024-10-30

## 2024-11-17 ENCOUNTER — MYC REFILL (OUTPATIENT)
Dept: FAMILY MEDICINE | Facility: CLINIC | Age: 64
End: 2024-11-17
Payer: COMMERCIAL

## 2024-11-17 DIAGNOSIS — E66.01 CLASS 3 SEVERE OBESITY WITH SERIOUS COMORBIDITY AND BODY MASS INDEX (BMI) OF 40.0 TO 44.9 IN ADULT, UNSPECIFIED OBESITY TYPE (H): ICD-10-CM

## 2024-11-17 DIAGNOSIS — E66.813 CLASS 3 SEVERE OBESITY WITH SERIOUS COMORBIDITY AND BODY MASS INDEX (BMI) OF 40.0 TO 44.9 IN ADULT, UNSPECIFIED OBESITY TYPE (H): ICD-10-CM

## 2024-11-17 RX ORDER — TIRZEPATIDE 7.5 MG/.5ML
7.5 INJECTION, SOLUTION SUBCUTANEOUS
Qty: 2 ML | Refills: 0 | Status: CANCELLED | OUTPATIENT
Start: 2024-11-17

## 2024-11-18 RX ORDER — TIRZEPATIDE 10 MG/.5ML
10 INJECTION, SOLUTION SUBCUTANEOUS
Qty: 2 ML | Refills: 0 | Status: SHIPPED | OUTPATIENT
Start: 2024-11-18

## 2025-01-06 DIAGNOSIS — E66.813 CLASS 3 SEVERE OBESITY WITH SERIOUS COMORBIDITY AND BODY MASS INDEX (BMI) OF 40.0 TO 44.9 IN ADULT, UNSPECIFIED OBESITY TYPE (H): ICD-10-CM

## 2025-01-06 DIAGNOSIS — E66.01 CLASS 3 SEVERE OBESITY WITH SERIOUS COMORBIDITY AND BODY MASS INDEX (BMI) OF 40.0 TO 44.9 IN ADULT, UNSPECIFIED OBESITY TYPE (H): ICD-10-CM

## 2025-01-07 ASSESSMENT — PATIENT HEALTH QUESTIONNAIRE - PHQ9
10. IF YOU CHECKED OFF ANY PROBLEMS, HOW DIFFICULT HAVE THESE PROBLEMS MADE IT FOR YOU TO DO YOUR WORK, TAKE CARE OF THINGS AT HOME, OR GET ALONG WITH OTHER PEOPLE: SOMEWHAT DIFFICULT
SUM OF ALL RESPONSES TO PHQ QUESTIONS 1-9: 3
SUM OF ALL RESPONSES TO PHQ QUESTIONS 1-9: 3

## 2025-01-07 NOTE — TELEPHONE ENCOUNTER
Patient is seeing you tomorrow morning. Okay to discuss then?    Tree Martinez RN     [facility]

## 2025-01-08 ENCOUNTER — OFFICE VISIT (OUTPATIENT)
Dept: FAMILY MEDICINE | Facility: CLINIC | Age: 65
End: 2025-01-08
Payer: COMMERCIAL

## 2025-01-08 VITALS
RESPIRATION RATE: 16 BRPM | BODY MASS INDEX: 41.85 KG/M2 | DIASTOLIC BLOOD PRESSURE: 65 MMHG | HEART RATE: 54 BPM | WEIGHT: 236.2 LBS | HEIGHT: 63 IN | OXYGEN SATURATION: 96 % | SYSTOLIC BLOOD PRESSURE: 130 MMHG

## 2025-01-08 DIAGNOSIS — F33.0 MILD EPISODE OF RECURRENT MAJOR DEPRESSIVE DISORDER: ICD-10-CM

## 2025-01-08 DIAGNOSIS — E78.00 HYPERCHOLESTEREMIA: ICD-10-CM

## 2025-01-08 DIAGNOSIS — E66.813 CLASS 3 SEVERE OBESITY WITH SERIOUS COMORBIDITY AND BODY MASS INDEX (BMI) OF 40.0 TO 44.9 IN ADULT, UNSPECIFIED OBESITY TYPE (H): Primary | ICD-10-CM

## 2025-01-08 DIAGNOSIS — E66.01 CLASS 3 SEVERE OBESITY WITH SERIOUS COMORBIDITY AND BODY MASS INDEX (BMI) OF 40.0 TO 44.9 IN ADULT, UNSPECIFIED OBESITY TYPE (H): Primary | ICD-10-CM

## 2025-01-08 DIAGNOSIS — Z98.84 BARIATRIC SURGERY STATUS: ICD-10-CM

## 2025-01-08 PROCEDURE — 99214 OFFICE O/P EST MOD 30 MIN: CPT | Performed by: FAMILY MEDICINE

## 2025-01-08 PROCEDURE — G2211 COMPLEX E/M VISIT ADD ON: HCPCS | Performed by: FAMILY MEDICINE

## 2025-01-08 RX ORDER — TIRZEPATIDE 12.5 MG/.5ML
12.5 INJECTION, SOLUTION SUBCUTANEOUS
Qty: 2 ML | Refills: 0 | Status: SHIPPED | OUTPATIENT
Start: 2025-01-08

## 2025-01-08 RX ORDER — TIRZEPATIDE 10 MG/.5ML
INJECTION, SOLUTION SUBCUTANEOUS
Qty: 2 ML | Refills: 0 | OUTPATIENT
Start: 2025-01-08

## 2025-01-08 RX ORDER — TIRZEPATIDE 10 MG/.5ML
10 INJECTION, SOLUTION SUBCUTANEOUS
Qty: 2 ML | Refills: 3 | Status: CANCELLED | OUTPATIENT
Start: 2025-01-08

## 2025-01-08 NOTE — PROGRESS NOTES
Return Medical Weight Management Note     Riana Cui  MRN:  [id number]  :  1960  THANIA:  2025        I had the pleasure of seeing Riana Cui. She is a 64 year old female who I am continuing to see for treatment of obesity related to:        10/31/2023    11:05 AM   --   I have the following health issues associated with obesity High Cholesterol   I have the following symptoms associated with obesity Knee Pain    Depression    Lower Extremity Swelling    Fatigue       INTERVAL HISTORY:  Riana comes in today for comprehensive weight management follow-up.  Her initial weight last January was 275.  She did initially get down to 253 with just changing her Paxil over to fluoxetine and her giving up soda.  She was unable to start Zepbound.  Today she is at 236 so she has had an almost 40 pound weight loss.  She is currently at the 10 mg dose.  She is really not having a lot of side effects.  She feels like it has been helpful with giving up her soda.  She has been able to control her appetite and make healthier choices.  She still is not very active but is doing some chair exercises and once a week she will do some stairs in her house.  She has noticed marked improvement in her pedal edema and she is happy about that.  She feels like her moods been pretty good.  Initially with the transition from Paxil to Prozac it was a little bit bumpy.  She has some stressors at work and will have some changes in March which should improve things.  She is planning on quitting smoking at some point and I think this abdominal probably be helpful for that as well.  She has noticed that when she takes her vitamin D 4000 units twice weekly, the next day she feels like she has a lot of energy.  She will try changing to just taking 1000 units every day to see if this helps with her mood and energy level.  The longitudinal plan of care for the diagnosis(es)/condition(s) as documented were addressed during this  "visit. Due to the added complexity in care, I will continue to support Riana in the subsequent management and with ongoing continuity of care.   CURRENT WEIGHT:   236 lbs 3.2 oz                       No data to display                VITALS:  /65 (BP Location: Left arm, Patient Position: Sitting, Cuff Size: Adult Large)   Pulse 54   Resp 16   Ht 1.6 m (5' 3\")   Wt 107.1 kg (236 lb 3.2 oz)   LMP  (LMP Unknown)   SpO2 96%   BMI 41.84 kg/m      MEDICATIONS:   Current Outpatient Medications   Medication Sig Dispense Refill    atorvastatin (LIPITOR) 20 MG tablet Take 1 tablet (20 mg) by mouth daily. 90 tablet 3    FLUoxetine (PROZAC) 40 MG capsule Take 1 capsule (40 mg) by mouth daily. 90 capsule 3    ZEPBOUND 12.5 MG/0.5ML prefilled pen Inject 0.5 mLs (12.5 mg) subcutaneously every 7 days. 2 mL 0            No data to display                ASSESSMENT/Plan  1. Class 3 severe obesity with serious comorbidity and body mass index (BMI) of 40.0 to 44.9 in adult, unspecified obesity type (H) (Primary)  She is off to a good start with an almost 40 pound weight loss.  She is doing well with the Zepbound and some lifestyle change.  Will increase to the 12 point milligram dosage as she is tolerating it well.  She will let me know when she needs a refill if she wants to go up to 15.  I will then see her back in 12 weeks.  - ZEPBOUND 12.5 MG/0.5ML prefilled pen; Inject 0.5 mLs (12.5 mg) subcutaneously every 7 days.  Dispense: 2 mL; Refill: 0    2. Mild episode of recurrent major depressive disorder  Her mood has now stabilized.  We did change over from Paxil to Prozac and she felt like that did help with some weight loss.  Her PHQ-9 equals 3.  She has noticed some improvement in her mood and energy level when she takes her vitamin D twice weekly.  Will have her switch over to daily at a low bit lower dose.    3. Post-gastric Bypass For Obesity  Her labs were pretty stable the last time we checked them.  She is " taking vitamin D and a multivitamin.    4. Hypercholesteremia  She did start a statin and she is tolerating it well without any adverse side effects.  When she comes in in 12 weeks we will recheck her lipids.        FOLLOW-UP:    12 weeks.        Sincerely,    Roxy Hagen MD

## 2025-01-08 NOTE — PROGRESS NOTES
"  {PROVIDER CHARTING PREFERENCE:931788}    Subjective   Riana is a 64 year old, presenting for the following health issues:  Recheck Medication (Med check in- no new concerns. Talk about the zepound)        1/8/2025     9:11 AM   Additional Questions   Roomed by Litzy Vines CMA     History of Present Illness       Hyperlipidemia:  She presents for follow up of hyperlipidemia.   She is taking medication to lower cholesterol. She is not having myalgia or other side effects to statin medications.    Reason for visit:  Recheck weight and follow up on meds    She eats 2-3 servings of fruits and vegetables daily.She consumes 1 sweetened beverage(s) daily.She exercises with enough effort to increase her heart rate 9 or less minutes per day.  She exercises with enough effort to increase her heart rate 3 or less days per week. She is missing 1 dose(s) of medications per week.  She is not taking prescribed medications regularly due to remembering to take.       {MA/LPN/RN Pre-Provider Visit Orders- hCG/UA/Strep (Optional):276687}  {SUPERLIST (Optional):421534}  {additonal problems for provider to add (Optional):686035}    {ROS Picklists (Optional):570150}      Objective    /65 (BP Location: Left arm, Patient Position: Sitting, Cuff Size: Adult Large)   Pulse 54   Resp 16   Ht 1.6 m (5' 3\")   Wt 107.1 kg (236 lb 3.2 oz)   LMP  (LMP Unknown)   SpO2 96%   BMI 41.84 kg/m    Body mass index is 41.84 kg/m .  Physical Exam   {Exam List (Optional):021156}    {Diagnostic Test Results (Optional):005721}        Signed Electronically by: Roxy Hagen MD  {Email feedback regarding this note to [email or web address]   :[id number]}  "

## 2025-02-03 ENCOUNTER — MYC MEDICAL ADVICE (OUTPATIENT)
Dept: FAMILY MEDICINE | Facility: CLINIC | Age: 65
End: 2025-02-03
Payer: COMMERCIAL

## 2025-02-03 DIAGNOSIS — E66.813 CLASS 3 SEVERE OBESITY WITH SERIOUS COMORBIDITY AND BODY MASS INDEX (BMI) OF 40.0 TO 44.9 IN ADULT, UNSPECIFIED OBESITY TYPE (H): ICD-10-CM

## 2025-02-03 DIAGNOSIS — E66.01 CLASS 3 SEVERE OBESITY WITH SERIOUS COMORBIDITY AND BODY MASS INDEX (BMI) OF 40.0 TO 44.9 IN ADULT, UNSPECIFIED OBESITY TYPE (H): ICD-10-CM

## 2025-02-03 RX ORDER — TIRZEPATIDE 12.5 MG/.5ML
12.5 INJECTION, SOLUTION SUBCUTANEOUS
Qty: 2 ML | Refills: 0 | Status: SHIPPED | OUTPATIENT
Start: 2025-02-03

## 2025-03-27 ENCOUNTER — OFFICE VISIT (OUTPATIENT)
Dept: FAMILY MEDICINE | Facility: CLINIC | Age: 65
End: 2025-03-27
Payer: COMMERCIAL

## 2025-03-27 VITALS
SYSTOLIC BLOOD PRESSURE: 132 MMHG | RESPIRATION RATE: 16 BRPM | HEART RATE: 57 BPM | DIASTOLIC BLOOD PRESSURE: 79 MMHG | BODY MASS INDEX: 35.72 KG/M2 | WEIGHT: 214.4 LBS | HEIGHT: 65 IN | OXYGEN SATURATION: 99 %

## 2025-03-27 DIAGNOSIS — R73.03 PREDIABETES: ICD-10-CM

## 2025-03-27 DIAGNOSIS — E66.01 CLASS 2 SEVERE OBESITY WITH SERIOUS COMORBIDITY AND BODY MASS INDEX (BMI) OF 35.0 TO 35.9 IN ADULT, UNSPECIFIED OBESITY TYPE (H): Primary | ICD-10-CM

## 2025-03-27 DIAGNOSIS — E66.812 CLASS 2 SEVERE OBESITY WITH SERIOUS COMORBIDITY AND BODY MASS INDEX (BMI) OF 35.0 TO 35.9 IN ADULT, UNSPECIFIED OBESITY TYPE (H): Primary | ICD-10-CM

## 2025-03-27 DIAGNOSIS — E78.00 HYPERCHOLESTEREMIA: ICD-10-CM

## 2025-03-27 DIAGNOSIS — F33.0 MILD EPISODE OF RECURRENT MAJOR DEPRESSIVE DISORDER: ICD-10-CM

## 2025-03-27 LAB
ANION GAP SERPL CALCULATED.3IONS-SCNC: 9 MMOL/L (ref 7–15)
BUN SERPL-MCNC: 21.7 MG/DL (ref 8–23)
CALCIUM SERPL-MCNC: 9.4 MG/DL (ref 8.8–10.4)
CHLORIDE SERPL-SCNC: 104 MMOL/L (ref 98–107)
CHOLEST SERPL-MCNC: 208 MG/DL
CREAT SERPL-MCNC: 0.94 MG/DL (ref 0.51–0.95)
EGFRCR SERPLBLD CKD-EPI 2021: 68 ML/MIN/1.73M2
EST. AVERAGE GLUCOSE BLD GHB EST-MCNC: 105 MG/DL
FASTING STATUS PATIENT QL REPORTED: YES
FASTING STATUS PATIENT QL REPORTED: YES
GLUCOSE SERPL-MCNC: 90 MG/DL (ref 70–99)
HBA1C MFR BLD: 5.3 % (ref 0–5.6)
HCO3 SERPL-SCNC: 26 MMOL/L (ref 22–29)
HDLC SERPL-MCNC: 50 MG/DL
LDLC SERPL CALC-MCNC: 139 MG/DL
NONHDLC SERPL-MCNC: 158 MG/DL
POTASSIUM SERPL-SCNC: 4.8 MMOL/L (ref 3.4–5.3)
SODIUM SERPL-SCNC: 139 MMOL/L (ref 135–145)
TRIGL SERPL-MCNC: 93 MG/DL

## 2025-03-27 NOTE — PROGRESS NOTES
"    Return Medical Weight Management Note     Riana Cui  MRN:  [id number]  :  1960  THANIA:  3/27/2025        I had the pleasure of seeing Riana Cui. She is a 64 year old female who I am continuing to see for treatment of obesity related to:        10/31/2023    11:05 AM   --   I have the following health issues associated with obesity High Cholesterol   I have the following symptoms associated with obesity Knee Pain    Depression    Lower Extremity Swelling    Fatigue       INTERVAL HISTORY:  Riana returns for comprehensive weight management follow-up.  Her intake weight in 2024 was 275.  She initially lost weight with just getting off of Paxil and stopping soda.  She got down about 253 and then was able to start Zepbound.  She been doing really well with the Zepbound.  When I saw her last she was at 236 and today she is at 214.  She is lost a total of 60 pounds now from the beginning.  She is feeling really good.  She is doing some chair exercises.  She has a lot less pedal edema.  She admits she is now doing 1-2 sodas per week more as a treat but has not gotten back to daily soda which she states that she thinks she can stay away from.  She is having some constipation and we discussed daily MiraLAX.  He wanted to do blood work today.  She has been off her statin for unclear reasons so we will recheck her lipids and discuss restarting if still elevated.  The longitudinal plan of care for the diagnosis(es)/condition(s) as documented were addressed during this visit. Due to the added complexity in care, I will continue to support Riana in the subsequent management and with ongoing continuity of care.   CURRENT WEIGHT:   214 lbs 6.4 oz                       No data to display                VITALS:  /79   Pulse 57   Resp 16   Ht 1.645 m (5' 4.75\")   Wt 97.3 kg (214 lb 6.4 oz)   LMP  (LMP Unknown)   SpO2 99%   BMI 35.95 kg/m      MEDICATIONS:   Current Outpatient " Medications   Medication Sig Dispense Refill    FLUoxetine (PROZAC) 40 MG capsule Take 1 capsule (40 mg) by mouth daily. 90 capsule 3    tirzepatide-Weight Management (ZEPBOUND) 15 MG/0.5ML prefilled pen Inject 0.5 mLs (15 mg) subcutaneously every 7 days. 6 mL 1    atorvastatin (LIPITOR) 20 MG tablet Take 1 tablet (20 mg) by mouth daily. (Patient not taking: Reported on 3/27/2025) 90 tablet 3            No data to display                ASSESSMENT/Plan  1. Class 2 severe obesity with serious comorbidity and body mass index (BMI) of 35.0 to 35.9 in adult, unspecified obesity type (H) (Primary)  She is now lost 60 pounds with lifestyle change and Zepbound.  She is at the 15 mg dose and still losing.  Her goal weight is 180.  Will have her follow-up in about 12 weeks and we will see how close she is to goal.    2. Hypercholesteremia  She has been off her statin.  Will recheck labs  - Lipid panel reflex to direct LDL Fasting; Future  - Basic metabolic panel; Future  - Lipid panel reflex to direct LDL Fasting  - Basic metabolic panel    3. Prediabetes  Once he is much improved today.  - Hemoglobin A1c; Future  - Hemoglobin A1c    4. Mild episode of recurrent major depressive disorder  Her mood is well-controlled with fluoxetine.        FOLLOW-UP:    12 weeks.        Sincerely,    Roxy Hagen MD

## 2025-04-17 NOTE — PROGRESS NOTES
6wks 2/13/24 s/p L AASV RFA . Hx: Left Popliteal Vein DVT 5/23/2023. Left GSV/ AASV RFA ablation done 2/15/2023.   [facility]        Patient is here for a 6 week follow up  to discuss Varicose vein(s). The patient has varicose veins that are problematic in bilateral legs. Symptoms patient has been experiencing are  swelling. Patient states their varicose veins are bothersome whenno problems household chores. Patient has not been using pain medication or anti-inflammatory's. Patient has not had recent imaging on legs done. Patient has done conservative measures which include: compression stockings, elevation, exercise, and weight loss. Treatment has been successful due to veins improved. Educated patient to work on conservative treatments.      Pt is currently taking Eliquis.Patient is ok to stop eliquis.                  Yes

## 2025-04-21 ENCOUNTER — NURSE TRIAGE (OUTPATIENT)
Dept: FAMILY MEDICINE | Facility: CLINIC | Age: 65
End: 2025-04-21
Payer: COMMERCIAL

## 2025-04-21 NOTE — TELEPHONE ENCOUNTER
It is likely a viral illness.  Would continue BRAT diet, no dairy.  Can use pepto bismol (will turn stool black).  I'd give it more time.  It is possible worse with the zepbound.  Would want to be seen if fever, blood in stool or last another week or so.

## 2025-04-21 NOTE — TELEPHONE ENCOUNTER
Nurse Triage SBAR    Is this a 2nd Level Triage? YES, LICENSED PRACTITIONER REVIEW IS REQUIRED    Situation: diarrhea     Background:     Assessment: Past week patient has been having diarrhea. It was 4-6 times a day when it first started. Patient stated Friday 4/18 it was almost constant Saturday  no diarrhea and Sunday she had more of big meal instead of just lighter BRAT diet foods and now for last 24 hrs she has had 6 stools. Stools are watery and yellowish in color. Patient is drinking water and pedilyte. Patient denies any cramping or abdominal pain or vomiting or fevers over past week.     Protocol Recommended Disposition:   No disposition on file.    Recommendation: Please advise on disposition. Patient has been using max daily dosage of imodium with little relief of diarrhea. Patient wondering if something else can be prescribed or if she should be seen      Routed to provider    Does the patient meet one of the following criteria for ADS visit consideration? No    Jacquie Brewster RN        Additional Information   Negative: Shock suspected (e.g., cold/pale/clammy skin, too weak to stand, low BP, rapid pulse)   Negative: Difficult to awaken or acting confused (e.g., disoriented, slurred speech)   Negative: Sounds like a life-threatening emergency to the triager   Negative: Vomiting also present and worse than the diarrhea   Negative: Blood in stool and without diarrhea   Negative: Diarrhea begins while taking an antibiotic by mouth (oral antibiotic)   Negative: SEVERE abdominal pain (e.g., excruciating) and present > 1 hour   Negative: SEVERE abdominal pain and age > 60 years   Negative: Bloody, black, or tarry bowel movements  (Exception: Chronic-unchanged black-grey bowel movements and is taking iron pills or Pepto-Bismol.)   Negative: SEVERE diarrhea (e.g., 7 or more times / day more than normal) and age > 60 years   Negative: Constant abdominal pain lasting > 2 hours   Negative: Drinking very little and  "dehydration suspected (e.g., no urine > 12 hours, very dry mouth, very lightheaded)   Negative: Patient sounds very sick or weak to the triager    Answer Assessment - Initial Assessment Questions  1. DIARRHEA SEVERITY: \"How bad is the diarrhea?\" \"How many more stools have you had in the past 24 hours than normal?\"       4-6 when it began. Past 24 hours 6 times   2. ONSET: \"When did the diarrhea begin?\"       Last week   3. STOOL DESCRIPTION:  \"How loose or watery is the diarrhea?\" \"What is the stool color?\" \"Is there any blood or mucous in the stool?\"      Watery yellow color   4. VOMITING: \"Are you also vomiting?\" If Yes, ask: \"How many times in the past 24 hours?\"       No   5. ABDOMEN PAIN: \"Are you having any abdomen pain?\" If Yes, ask: \"What does it feel like?\" (e.g., crampy, dull, intermittent, constant)       No cramping no abdominal pain   6. ABDOMEN PAIN SEVERITY: If present, ask: \"How bad is the pain?\"  (e.g., Scale 1-10; mild, moderate, or severe)      No pain   7. ORAL INTAKE: If vomiting, \"Have you been able to drink liquids?\" \"How much liquids have you had in the past 24 hours?\"        8. HYDRATION: \"Any signs of dehydration?\" (e.g., dry mouth [not just dry lips], too weak to stand, dizziness, new weight loss) \"When did you last urinate?\"      Drinking water and pedilite doesn't feel dehydrated   9. EXPOSURE: \"Have you traveled to a foreign country recently?\" \"Have you been exposed to anyone with diarrhea?\" \"Could you have eaten any food that was spoiled?\"      No   10. ANTIBIOTIC USE: \"Are you taking antibiotics now or have you taken antibiotics in the past 2 months?\"        no  11. OTHER SYMPTOMS: \"Do you have any other symptoms?\" (e.g., fever, blood in stool)        no  12. PREGNANCY: \"Is there any chance you are pregnant?\" \"When was your last menstrual period?\"        no    Protocols used: Diarrhea-A-[place]    "

## 2025-06-09 ENCOUNTER — PATIENT OUTREACH (OUTPATIENT)
Dept: CARE COORDINATION | Facility: CLINIC | Age: 65
End: 2025-06-09
Payer: COMMERCIAL

## 2025-06-10 ENCOUNTER — MYC MEDICAL ADVICE (OUTPATIENT)
Dept: FAMILY MEDICINE | Facility: CLINIC | Age: 65
End: 2025-06-10
Payer: COMMERCIAL

## 2025-06-10 DIAGNOSIS — M17.12 PRIMARY OSTEOARTHRITIS OF LEFT KNEE: Primary | ICD-10-CM

## 2025-06-12 ENCOUNTER — PATIENT OUTREACH (OUTPATIENT)
Dept: CARE COORDINATION | Facility: CLINIC | Age: 65
End: 2025-06-12
Payer: COMMERCIAL

## 2025-06-16 ENCOUNTER — PATIENT OUTREACH (OUTPATIENT)
Dept: CARE COORDINATION | Facility: CLINIC | Age: 65
End: 2025-06-16
Payer: COMMERCIAL

## 2025-06-24 ENCOUNTER — OFFICE VISIT (OUTPATIENT)
Dept: FAMILY MEDICINE | Facility: CLINIC | Age: 65
End: 2025-06-24
Payer: COMMERCIAL

## 2025-06-24 VITALS
OXYGEN SATURATION: 96 % | DIASTOLIC BLOOD PRESSURE: 80 MMHG | SYSTOLIC BLOOD PRESSURE: 137 MMHG | HEART RATE: 60 BPM | BODY MASS INDEX: 34.29 KG/M2 | RESPIRATION RATE: 16 BRPM | HEIGHT: 65 IN | WEIGHT: 205.8 LBS

## 2025-06-24 DIAGNOSIS — Z12.31 ENCOUNTER FOR SCREENING MAMMOGRAM FOR BREAST CANCER: ICD-10-CM

## 2025-06-24 DIAGNOSIS — E78.00 HYPERCHOLESTEREMIA: ICD-10-CM

## 2025-06-24 DIAGNOSIS — E66.811 CLASS 1 OBESITY WITH SERIOUS COMORBIDITY AND BODY MASS INDEX (BMI) OF 34.0 TO 34.9 IN ADULT, UNSPECIFIED OBESITY TYPE: Primary | ICD-10-CM

## 2025-06-24 PROCEDURE — 99214 OFFICE O/P EST MOD 30 MIN: CPT | Performed by: FAMILY MEDICINE

## 2025-06-24 PROCEDURE — G2211 COMPLEX E/M VISIT ADD ON: HCPCS | Performed by: FAMILY MEDICINE

## 2025-06-24 NOTE — PROGRESS NOTES
"    Return Medical Weight Management Note     Riana Cui  MRN:  [id number]  :  1960  THANIA:  2025        I had the pleasure of seeing  Riana Cui. She is a 64 year old female who I am continuing to see for treatment of obesity related to:        10/31/2023    11:05 AM   --   I have the following health issues associated with obesity High Cholesterol   I have the following symptoms associated with obesity Knee Pain    Depression    Lower Extremity Swelling    Fatigue       INTERVAL HISTORY:  Riana comes in today for comprehensive weight management follow-up.  Her intake weight in 2024 was 275.  She initially lost some weight with quitting soda and changing from Paxil to Prozac.  She was unable to get Zepbound and when I saw her last she was down to 214.  Today she is at 205.  She is now have a 70 pound weight loss.  She states she is feeling really good.  She is able to be more active.  She is having less pedal edema.  She has noticed some loose skin under her arms which she does not like and we discussed there is really not much she can do for it other than surgery or certain garments.  She would like to go down on the Zepbound a little bit.  She has been on the 15 but has had a lot of diarrhea.  She felt like she was doing better at the 12.5.  She still would like to lose a little bit more weight but is okay if it is lower.  As an aside she is due for her Pap smear and mammogram.  When we see her for follow-up in 12 weeks we will do a physical.  The longitudinal plan of care for the diagnosis(es)/condition(s) as documented were addressed during this visit. Due to the added complexity in care, I will continue to support Riana in the subsequent management and with ongoing continuity of care.     CURRENT WEIGHT:   205 lbs 12.8 oz                       No data to display                VITALS:  /80   Pulse 60   Resp 16   Ht 1.645 m (5' 4.75\")   Wt 93.4 kg (205 lb 12.8 oz) "   LMP  (LMP Unknown)   SpO2 96%   BMI 34.51 kg/m      MEDICATIONS:   Current Outpatient Medications   Medication Sig Dispense Refill    atorvastatin (LIPITOR) 20 MG tablet Take 1 tablet (20 mg) by mouth daily. 90 tablet 3    FLUoxetine (PROZAC) 40 MG capsule Take 1 capsule (40 mg) by mouth daily. 90 capsule 3    tirzepatide-Weight Management (ZEPBOUND) 12.5 MG/0.5ML prefilled pen Inject 0.5 mLs (12.5 mg) subcutaneously every 7 days. 2 mL 3            No data to display                ASSESSMENT/Plan  1. Class 1 obesity with serious comorbidity and body mass index (BMI) of 34.0 to 34.9 in adult, unspecified obesity type (Primary)  She is doing really great job with a 70 pound weight loss with the Zepbound and lifestyle change.  Will decrease the Zepbound to 12.5 as she is having some ongoing diarrhea.  She will let me know if she wants to increase that again at any point.  Otherwise we will see her in 12 weeks for complete physical.  - tirzepatide-Weight Management (ZEPBOUND) 12.5 MG/0.5ML prefilled pen; Inject 0.5 mLs (12.5 mg) subcutaneously every 7 days.  Dispense: 2 mL; Refill: 3    2. Hypercholesteremia  Stable on statin.  Will do blood work when I see her next.    3. Encounter for screening mammogram for breast cancer    - [facility] Screening Bilateral w/ [facility]; Future        FOLLOW-UP:    12 weeks.        Roxy Hagen MD

## 2025-06-25 ENCOUNTER — PATIENT OUTREACH (OUTPATIENT)
Dept: CARE COORDINATION | Facility: CLINIC | Age: 65
End: 2025-06-25
Payer: COMMERCIAL

## 2025-06-26 ENCOUNTER — TRANSFERRED RECORDS (OUTPATIENT)
Dept: HEALTH INFORMATION MANAGEMENT | Facility: CLINIC | Age: 65
End: 2025-06-26
Payer: COMMERCIAL

## 2025-07-31 ENCOUNTER — MYC REFILL (OUTPATIENT)
Dept: FAMILY MEDICINE | Facility: CLINIC | Age: 65
End: 2025-07-31
Payer: COMMERCIAL

## 2025-07-31 ENCOUNTER — MYC MEDICAL ADVICE (OUTPATIENT)
Dept: FAMILY MEDICINE | Facility: CLINIC | Age: 65
End: 2025-07-31
Payer: COMMERCIAL

## 2025-07-31 ENCOUNTER — TELEPHONE (OUTPATIENT)
Dept: FAMILY MEDICINE | Facility: CLINIC | Age: 65
End: 2025-07-31
Payer: COMMERCIAL

## 2025-07-31 DIAGNOSIS — E66.811 CLASS 1 OBESITY WITH SERIOUS COMORBIDITY AND BODY MASS INDEX (BMI) OF 34.0 TO 34.9 IN ADULT, UNSPECIFIED OBESITY TYPE: ICD-10-CM

## 2025-07-31 DIAGNOSIS — E66.811 CLASS 1 OBESITY WITH SERIOUS COMORBIDITY AND BODY MASS INDEX (BMI) OF 34.0 TO 34.9 IN ADULT, UNSPECIFIED OBESITY TYPE: Primary | ICD-10-CM

## 2025-08-04 ENCOUNTER — ANCILLARY PROCEDURE (OUTPATIENT)
Dept: MAMMOGRAPHY | Facility: CLINIC | Age: 65
End: 2025-08-04
Attending: FAMILY MEDICINE
Payer: COMMERCIAL

## 2025-08-04 DIAGNOSIS — Z12.31 ENCOUNTER FOR SCREENING MAMMOGRAM FOR BREAST CANCER: ICD-10-CM

## 2025-08-04 PROCEDURE — 77067 SCR MAMMO BI INCL CAD: CPT

## 2025-08-14 SDOH — HEALTH STABILITY: PHYSICAL HEALTH: ON AVERAGE, HOW MANY DAYS PER WEEK DO YOU ENGAGE IN MODERATE TO STRENUOUS EXERCISE (LIKE A BRISK WALK)?: 0 DAYS

## 2025-08-14 SDOH — HEALTH STABILITY: PHYSICAL HEALTH: ON AVERAGE, HOW MANY MINUTES DO YOU ENGAGE IN EXERCISE AT THIS LEVEL?: 0 MIN

## 2025-08-14 ASSESSMENT — SOCIAL DETERMINANTS OF HEALTH (SDOH): HOW OFTEN DO YOU GET TOGETHER WITH FRIENDS OR RELATIVES?: TWICE A WEEK

## 2025-08-21 ENCOUNTER — OFFICE VISIT (OUTPATIENT)
Dept: FAMILY MEDICINE | Facility: CLINIC | Age: 65
End: 2025-08-21
Payer: COMMERCIAL

## 2025-08-21 VITALS
TEMPERATURE: 97.9 F | HEART RATE: 54 BPM | RESPIRATION RATE: 16 BRPM | DIASTOLIC BLOOD PRESSURE: 83 MMHG | WEIGHT: 203 LBS | SYSTOLIC BLOOD PRESSURE: 131 MMHG | OXYGEN SATURATION: 96 % | BODY MASS INDEX: 34.04 KG/M2

## 2025-08-21 DIAGNOSIS — F41.1 ANXIETY STATE: ICD-10-CM

## 2025-08-21 DIAGNOSIS — I83.893 SYMPTOMATIC VARICOSE VEINS OF BOTH LOWER EXTREMITIES: ICD-10-CM

## 2025-08-21 DIAGNOSIS — Z98.84 BARIATRIC SURGERY STATUS: ICD-10-CM

## 2025-08-21 DIAGNOSIS — F33.0 MILD EPISODE OF RECURRENT MAJOR DEPRESSIVE DISORDER: ICD-10-CM

## 2025-08-21 DIAGNOSIS — E66.811 CLASS 1 OBESITY WITH SERIOUS COMORBIDITY AND BODY MASS INDEX (BMI) OF 34.0 TO 34.9 IN ADULT, UNSPECIFIED OBESITY TYPE: ICD-10-CM

## 2025-08-21 DIAGNOSIS — Z87.891 HISTORY OF TOBACCO USE, PRESENTING HAZARDS TO HEALTH: ICD-10-CM

## 2025-08-21 DIAGNOSIS — E78.00 HYPERCHOLESTEREMIA: ICD-10-CM

## 2025-08-21 DIAGNOSIS — Z00.00 ROUTINE GENERAL MEDICAL EXAMINATION AT A HEALTH CARE FACILITY: Primary | ICD-10-CM

## 2025-08-21 DIAGNOSIS — Z12.4 CERVICAL CANCER SCREENING: ICD-10-CM

## 2025-08-21 LAB
ALBUMIN SERPL BCG-MCNC: 3.7 G/DL (ref 3.5–5.2)
ALP SERPL-CCNC: 70 U/L (ref 40–150)
ALT SERPL W P-5'-P-CCNC: 22 U/L (ref 0–50)
ANION GAP SERPL CALCULATED.3IONS-SCNC: 10 MMOL/L (ref 7–15)
AST SERPL W P-5'-P-CCNC: 23 U/L (ref 0–45)
BILIRUB SERPL-MCNC: 0.3 MG/DL
BUN SERPL-MCNC: 15.6 MG/DL (ref 8–23)
CALCIUM SERPL-MCNC: 9.2 MG/DL (ref 8.8–10.4)
CHLORIDE SERPL-SCNC: 104 MMOL/L (ref 98–107)
CHOLEST SERPL-MCNC: 186 MG/DL
CREAT SERPL-MCNC: 0.8 MG/DL (ref 0.51–0.95)
EGFRCR SERPLBLD CKD-EPI 2021: 83 ML/MIN/1.73M2
ERYTHROCYTE [DISTWIDTH] IN BLOOD BY AUTOMATED COUNT: 13.8 % (ref 10–15)
EST. AVERAGE GLUCOSE BLD GHB EST-MCNC: 108 MG/DL
FASTING STATUS PATIENT QL REPORTED: YES
FASTING STATUS PATIENT QL REPORTED: YES
FERRITIN SERPL-MCNC: 36 NG/ML (ref 11–328)
FOLATE SERPL-MCNC: 4.9 NG/ML (ref 4.6–34.8)
GLUCOSE SERPL-MCNC: 88 MG/DL (ref 70–99)
HBA1C MFR BLD: 5.4 % (ref 0–5.6)
HCO3 SERPL-SCNC: 27 MMOL/L (ref 22–29)
HCT VFR BLD AUTO: 43.1 % (ref 35–47)
HDLC SERPL-MCNC: 51 MG/DL
HGB BLD-MCNC: 14 G/DL (ref 11.7–15.7)
LDLC SERPL CALC-MCNC: 114 MG/DL
MCH RBC QN AUTO: 30 PG (ref 26.5–33)
MCHC RBC AUTO-ENTMCNC: 32.5 G/DL (ref 31.5–36.5)
MCV RBC AUTO: 92.5 FL (ref 78–100)
NONHDLC SERPL-MCNC: 135 MG/DL
PLATELET # BLD AUTO: 217 10E3/UL (ref 150–450)
POTASSIUM SERPL-SCNC: 3.8 MMOL/L (ref 3.4–5.3)
PROT SERPL-MCNC: 6.1 G/DL (ref 6.4–8.3)
PTH-INTACT SERPL-MCNC: 51 PG/ML (ref 15–65)
RBC # BLD AUTO: 4.66 10E6/UL (ref 3.8–5.2)
SODIUM SERPL-SCNC: 141 MMOL/L (ref 135–145)
TRIGL SERPL-MCNC: 107 MG/DL
TSH SERPL DL<=0.005 MIU/L-ACNC: 1.64 UIU/ML (ref 0.3–4.2)
VIT B12 SERPL-MCNC: 227 PG/ML (ref 232–1245)
VIT D+METAB SERPL-MCNC: 32 NG/ML (ref 20–50)
WBC # BLD AUTO: 5.77 10E3/UL (ref 4–11)

## 2025-08-21 RX ORDER — FLUOXETINE HYDROCHLORIDE 40 MG/1
40 CAPSULE ORAL DAILY
Qty: 90 CAPSULE | Refills: 3 | Status: SHIPPED | OUTPATIENT
Start: 2025-08-21

## 2025-08-21 RX ORDER — ATORVASTATIN CALCIUM 20 MG/1
20 TABLET, FILM COATED ORAL DAILY
Qty: 90 TABLET | Refills: 3 | Status: SHIPPED | OUTPATIENT
Start: 2025-08-21

## 2025-08-21 ASSESSMENT — PATIENT HEALTH QUESTIONNAIRE - PHQ9
SUM OF ALL RESPONSES TO PHQ QUESTIONS 1-9: 0
10. IF YOU CHECKED OFF ANY PROBLEMS, HOW DIFFICULT HAVE THESE PROBLEMS MADE IT FOR YOU TO DO YOUR WORK, TAKE CARE OF THINGS AT HOME, OR GET ALONG WITH OTHER PEOPLE: NOT DIFFICULT AT ALL
SUM OF ALL RESPONSES TO PHQ QUESTIONS 1-9: 0

## 2025-08-21 ASSESSMENT — ANXIETY QUESTIONNAIRES
7. FEELING AFRAID AS IF SOMETHING AWFUL MIGHT HAPPEN: NOT AT ALL
6. BECOMING EASILY ANNOYED OR IRRITABLE: SEVERAL DAYS
3. WORRYING TOO MUCH ABOUT DIFFERENT THINGS: NOT AT ALL
GAD7 TOTAL SCORE: 1
5. BEING SO RESTLESS THAT IT IS HARD TO SIT STILL: NOT AT ALL
4. TROUBLE RELAXING: NOT AT ALL
2. NOT BEING ABLE TO STOP OR CONTROL WORRYING: NOT AT ALL
GAD7 TOTAL SCORE: 1
1. FEELING NERVOUS, ANXIOUS, OR ON EDGE: NOT AT ALL
IF YOU CHECKED OFF ANY PROBLEMS ON THIS QUESTIONNAIRE, HOW DIFFICULT HAVE THESE PROBLEMS MADE IT FOR YOU TO DO YOUR WORK, TAKE CARE OF THINGS AT HOME, OR GET ALONG WITH OTHER PEOPLE: NOT DIFFICULT AT ALL
8. IF YOU CHECKED OFF ANY PROBLEMS, HOW DIFFICULT HAVE THESE MADE IT FOR YOU TO DO YOUR WORK, TAKE CARE OF THINGS AT HOME, OR GET ALONG WITH OTHER PEOPLE?: NOT DIFFICULT AT ALL
GAD7 TOTAL SCORE: 1
7. FEELING AFRAID AS IF SOMETHING AWFUL MIGHT HAPPEN: NOT AT ALL

## 2025-08-26 LAB
BKR AP ASSOCIATED HPV REPORT: NORMAL
BKR LAB AP GYN ADEQUACY: NORMAL
BKR LAB AP GYN INTERPRETATION: NORMAL
BKR LAB AP PREVIOUS ABNORMAL: NORMAL
PATH REPORT.COMMENTS IMP SPEC: NORMAL
PATH REPORT.COMMENTS IMP SPEC: NORMAL
PATH REPORT.RELEVANT HX SPEC: NORMAL